# Patient Record
Sex: MALE | Race: BLACK OR AFRICAN AMERICAN | NOT HISPANIC OR LATINO | Employment: FULL TIME | URBAN - METROPOLITAN AREA
[De-identification: names, ages, dates, MRNs, and addresses within clinical notes are randomized per-mention and may not be internally consistent; named-entity substitution may affect disease eponyms.]

---

## 2017-01-01 ENCOUNTER — HOSPITAL ENCOUNTER (INPATIENT)
Facility: HOSPITAL | Age: 57
LOS: 1 days | Discharge: HOME | DRG: 176 | End: 2017-01-02
Attending: EMERGENCY MEDICINE | Admitting: HOSPITALIST

## 2017-01-01 DIAGNOSIS — R07.9 RIGHT-SIDED CHEST PAIN: ICD-10-CM

## 2017-01-01 DIAGNOSIS — I26.99 PULMONARY EMBOLISM: ICD-10-CM

## 2017-01-01 DIAGNOSIS — I26.99 OTHER ACUTE PULMONARY EMBOLISM WITHOUT ACUTE COR PULMONALE: ICD-10-CM

## 2017-01-01 DIAGNOSIS — H53.452 DECREASED PERIPHERAL VISION OF LEFT EYE: ICD-10-CM

## 2017-01-01 LAB
ALBUMIN SERPL BCP-MCNC: 3.8 G/DL
ALP SERPL-CCNC: 67 U/L
ALT SERPL W/O P-5'-P-CCNC: 19 U/L
ANION GAP SERPL CALC-SCNC: 9 MMOL/L
AST SERPL-CCNC: 21 U/L
BASOPHILS # BLD AUTO: 0.01 K/UL
BASOPHILS NFR BLD: 0.2 %
BILIRUB SERPL-MCNC: 0.6 MG/DL
BILIRUB UR QL STRIP: NEGATIVE
BNP SERPL-MCNC: 17 PG/ML
BUN SERPL-MCNC: 11 MG/DL
CALCIUM SERPL-MCNC: 9.5 MG/DL
CHLORIDE SERPL-SCNC: 107 MMOL/L
CLARITY UR REFRACT.AUTO: CLEAR
CO2 SERPL-SCNC: 25 MMOL/L
COLOR UR AUTO: YELLOW
CREAT SERPL-MCNC: 1.4 MG/DL
DIFFERENTIAL METHOD: ABNORMAL
EOSINOPHIL # BLD AUTO: 0 K/UL
EOSINOPHIL NFR BLD: 0.7 %
ERYTHROCYTE [DISTWIDTH] IN BLOOD BY AUTOMATED COUNT: 13.5 %
EST. GFR  (AFRICAN AMERICAN): >60 ML/MIN/1.73 M^2
EST. GFR  (NON AFRICAN AMERICAN): 56.2 ML/MIN/1.73 M^2
FLUAV AG SPEC QL IA: NEGATIVE
FLUBV AG SPEC QL IA: NEGATIVE
GLUCOSE SERPL-MCNC: 106 MG/DL
GLUCOSE UR QL STRIP: ABNORMAL
HCT VFR BLD AUTO: 42.4 %
HGB BLD-MCNC: 14.7 G/DL
HGB UR QL STRIP: NEGATIVE
KETONES UR QL STRIP: NEGATIVE
LEUKOCYTE ESTERASE UR QL STRIP: NEGATIVE
LIPASE SERPL-CCNC: 68 U/L
LYMPHOCYTES # BLD AUTO: 1.2 K/UL
LYMPHOCYTES NFR BLD: 20.4 %
MCH RBC QN AUTO: 29.5 PG
MCHC RBC AUTO-ENTMCNC: 34.7 %
MCV RBC AUTO: 85 FL
MONOCYTES # BLD AUTO: 0.7 K/UL
MONOCYTES NFR BLD: 13.1 %
NEUTROPHILS # BLD AUTO: 3.7 K/UL
NEUTROPHILS NFR BLD: 65.1 %
NITRITE UR QL STRIP: NEGATIVE
PH UR STRIP: 6 [PH] (ref 5–8)
PLATELET # BLD AUTO: 122 K/UL
PMV BLD AUTO: 10.4 FL
POTASSIUM SERPL-SCNC: 3.9 MMOL/L
PROT SERPL-MCNC: 7.8 G/DL
PROT UR QL STRIP: NEGATIVE
RBC # BLD AUTO: 4.98 M/UL
SODIUM SERPL-SCNC: 141 MMOL/L
SP GR UR STRIP: 1.01 (ref 1–1.03)
SPECIMEN SOURCE: NORMAL
TROPONIN I SERPL DL<=0.01 NG/ML-MCNC: <0.006 NG/ML
URN SPEC COLLECT METH UR: ABNORMAL
UROBILINOGEN UR STRIP-ACNC: NEGATIVE EU/DL
WBC # BLD AUTO: 5.63 K/UL

## 2017-01-01 PROCEDURE — 84484 ASSAY OF TROPONIN QUANT: CPT

## 2017-01-01 PROCEDURE — 99223 1ST HOSP IP/OBS HIGH 75: CPT | Mod: ,,, | Performed by: PHYSICIAN ASSISTANT

## 2017-01-01 PROCEDURE — 87400 INFLUENZA A/B EACH AG IA: CPT

## 2017-01-01 PROCEDURE — 96365 THER/PROPH/DIAG IV INF INIT: CPT

## 2017-01-01 PROCEDURE — 63600175 PHARM REV CODE 636 W HCPCS: Performed by: EMERGENCY MEDICINE

## 2017-01-01 PROCEDURE — 99291 CRITICAL CARE FIRST HOUR: CPT | Mod: ,,, | Performed by: EMERGENCY MEDICINE

## 2017-01-01 PROCEDURE — 96375 TX/PRO/DX INJ NEW DRUG ADDON: CPT

## 2017-01-01 PROCEDURE — 83880 ASSAY OF NATRIURETIC PEPTIDE: CPT

## 2017-01-01 PROCEDURE — 96372 THER/PROPH/DIAG INJ SC/IM: CPT

## 2017-01-01 PROCEDURE — 25500020 PHARM REV CODE 255: Performed by: EMERGENCY MEDICINE

## 2017-01-01 PROCEDURE — 85025 COMPLETE CBC W/AUTO DIFF WBC: CPT

## 2017-01-01 PROCEDURE — 80053 COMPREHEN METABOLIC PANEL: CPT

## 2017-01-01 PROCEDURE — 83690 ASSAY OF LIPASE: CPT

## 2017-01-01 PROCEDURE — 93010 ELECTROCARDIOGRAM REPORT: CPT | Mod: ,,, | Performed by: INTERNAL MEDICINE

## 2017-01-01 PROCEDURE — 99285 EMERGENCY DEPT VISIT HI MDM: CPT | Mod: 25

## 2017-01-01 PROCEDURE — 11000001 HC ACUTE MED/SURG PRIVATE ROOM

## 2017-01-01 PROCEDURE — 81003 URINALYSIS AUTO W/O SCOPE: CPT

## 2017-01-01 PROCEDURE — 87040 BLOOD CULTURE FOR BACTERIA: CPT

## 2017-01-01 RX ORDER — MOXIFLOXACIN HYDROCHLORIDE 400 MG/250ML
400 INJECTION, SOLUTION INTRAVENOUS
Status: COMPLETED | OUTPATIENT
Start: 2017-01-01 | End: 2017-01-01

## 2017-01-01 RX ORDER — ONDANSETRON 2 MG/ML
4 INJECTION INTRAMUSCULAR; INTRAVENOUS EVERY 8 HOURS PRN
Status: DISCONTINUED | OUTPATIENT
Start: 2017-01-01 | End: 2017-01-02 | Stop reason: HOSPADM

## 2017-01-01 RX ORDER — ONDANSETRON 8 MG/1
8 TABLET, ORALLY DISINTEGRATING ORAL EVERY 8 HOURS PRN
Status: DISCONTINUED | OUTPATIENT
Start: 2017-01-01 | End: 2017-01-02 | Stop reason: HOSPADM

## 2017-01-01 RX ORDER — MOXIFLOXACIN HYDROCHLORIDE 400 MG/250ML
400 INJECTION, SOLUTION INTRAVENOUS
Status: DISCONTINUED | OUTPATIENT
Start: 2017-01-02 | End: 2017-01-02

## 2017-01-01 RX ORDER — MORPHINE SULFATE 2 MG/ML
6 INJECTION, SOLUTION INTRAMUSCULAR; INTRAVENOUS
Status: COMPLETED | OUTPATIENT
Start: 2017-01-01 | End: 2017-01-01

## 2017-01-01 RX ORDER — ACETAMINOPHEN 325 MG/1
650 TABLET ORAL EVERY 6 HOURS PRN
Status: DISCONTINUED | OUTPATIENT
Start: 2017-01-01 | End: 2017-01-02 | Stop reason: HOSPADM

## 2017-01-01 RX ORDER — SODIUM CHLORIDE 0.9 % (FLUSH) 0.9 %
3 SYRINGE (ML) INJECTION EVERY 8 HOURS
Status: DISCONTINUED | OUTPATIENT
Start: 2017-01-02 | End: 2017-01-02 | Stop reason: HOSPADM

## 2017-01-01 RX ORDER — HYDROCODONE BITARTRATE AND ACETAMINOPHEN 5; 325 MG/1; MG/1
1 TABLET ORAL EVERY 6 HOURS PRN
Status: DISCONTINUED | OUTPATIENT
Start: 2017-01-01 | End: 2017-01-02 | Stop reason: HOSPADM

## 2017-01-01 RX ORDER — ENOXAPARIN SODIUM 100 MG/ML
1 INJECTION SUBCUTANEOUS
Status: DISCONTINUED | OUTPATIENT
Start: 2017-01-02 | End: 2017-01-02 | Stop reason: HOSPADM

## 2017-01-01 RX ORDER — ENOXAPARIN SODIUM 100 MG/ML
1 INJECTION SUBCUTANEOUS
Status: COMPLETED | OUTPATIENT
Start: 2017-01-01 | End: 2017-01-01

## 2017-01-01 RX ADMIN — MORPHINE SULFATE 6 MG: 2 INJECTION, SOLUTION INTRAMUSCULAR; INTRAVENOUS at 09:01

## 2017-01-01 RX ADMIN — IOHEXOL 100 ML: 350 INJECTION, SOLUTION INTRAVENOUS at 07:01

## 2017-01-01 RX ADMIN — MOXIFLOXACIN HYDROCHLORIDE 400 MG: 400 INJECTION, SOLUTION INTRAVENOUS at 09:01

## 2017-01-01 RX ADMIN — ENOXAPARIN SODIUM 80 MG: 100 INJECTION SUBCUTANEOUS at 09:01

## 2017-01-01 NOTE — IP AVS SNAPSHOT
[facility]  [address]  [facility] [address]  Phone: [phone]           Patient Discharge Instructions     Our goal is to set you up for success. This packet includes information on your condition, medications, and your home care. It will help you to care for yourself so you don't get sicker and need to go back to the hospital.     Please ask your nurse if you have any questions.        There are many details to remember when preparing to leave the hospital. Here is what you will need to do:    1. Take your medicine. If you are prescribed medications, review your Medication List in the following pages. You may have new medications to  at the pharmacy and others that you'll need to stop taking. Review the instructions for how and when to take your medications. Talk with your doctor or nurses if you are unsure of what to do.     2. Go to your follow-up appointments. Specific follow-up information is listed in the following pages. Your may be contacted by a transition nurse or clinical provider about future appointments. Be sure we have all of the phone numbers to reach you, if needed. Please contact your provider's office if you are unable to make an appointment.     3. Watch for warning signs. Your doctor or nurse will give you detailed warning signs to watch for and when to call for assistance. These instructions may also include educational information about your condition. If you experience any of warning signs to your health, call your doctor.               [facility]  Unless otherwise directed by your provider, please contact [facility], our nurse care line that is available for 24/7 assistance.     [phone] (toll-free)    Registered nurses in the [facility] provide clinical advisement, health education, appointment booking, and other advisory services.                    ** Verify the list of medication(s) below is accurate and up  to date. Carry this with you in case of emergency. If your medications have changed, please notify your healthcare provider.             Medication List      START taking these medications        Additional Info                      apixaban 5 mg Tab   Quantity:  90 tablet   Refills:  5    Instructions:  Take 2 tablets (10 mg total) in AM and 2 tablets (10 mg total) in PM for first 7 days by mouth. Then take 1 tablet (5 mg total) in AM and 1 tablet (5 mg total) in PM daily by mouth.     Begin Date    AM    Noon    PM    Bedtime       hydrocodone-acetaminophen 5-325mg 5-325 mg per tablet   Commonly known as:  NORCO   Quantity:  20 tablet   Refills:  0   Dose:  1 tablet    Last time this was given:  1 tablet on 1/2/2017  1:46 PM   Instructions:  Take 1 tablet by mouth every 6 (six) hours as needed.     Begin Date    AM    Noon    PM    Bedtime         CONTINUE taking these medications        Additional Info                      acetaminophen 500 MG tablet   Commonly known as:  TYLENOL   Refills:  0   Dose:  1000 mg    Instructions:  Take 1,000 mg by mouth daily as needed for Pain.     Begin Date    AM    Noon    PM    Bedtime            Where to Get Your Medications      You can get these medications from any pharmacy     Bring a paper prescription for each of these medications     apixaban 5 mg Tab    hydrocodone-acetaminophen 5-325mg 5-325 mg per tablet                  Please bring to all follow up appointments:    1. A copy of your discharge instructions.  2. All medicines you are currently taking in their original bottles.  3. Identification and insurance card.    Please arrive 15 minutes ahead of scheduled appointment time.    Please call 24 hours in advance if you must reschedule your appointment and/or time.        Follow-up Information     Follow up with Nessa Pedersen MD. Schedule an appointment as soon as possible for a visit in 1 week.    Specialties:  General Practice, Physical Medicine and  "Rehabilitation    Why:  hospital follow up    Contact information:    [address]  [phone]          Discharge Instructions     Future Orders    Activity as tolerated     Activity as tolerated     Diet general     Questions:    Total calories:      Fat restriction, if any:      Protein restriction, if any:      Na restriction, if any:      Fluid restriction:      Additional restrictions:      Diet general     Questions:    Total calories:      Fat restriction, if any:      Protein restriction, if any:      Na restriction, if any:      Fluid restriction:      Additional restrictions:          Primary Diagnosis     Your primary diagnosis was:  Blood Clot To Lung      Admission Information     Date & Time Provider Department CSN    1/1/2017  4:48 PM Hazel Holden MD [facility] [id number]      Care Providers     Provider Role Specialty Primary office phone    Hazel Holden MD Attending Provider Hospitalist [phone]    Corazon Anderson MD Team Attending  Hospitalist [phone]    Hazel Holden MD Team Attending  Hospitalist [phone]      Your Vitals Were     BP Pulse Temp Resp Height Weight    112/67 (BP Location: Right arm, Patient Position: Lying, BP Method: Automatic) 82 98.3 °F (36.8 °C) (Oral) 18 5' 9" (1.753 m) 84.4 kg (186 lb)    SpO2 BMI             97% 27.47 kg/m2         Recent Lab Values        6/13/2007                           6:10 AM           A1C 5.7                       Pending Labs     Order Current Status    Blood Culture #1 **CANNOT BE ORDERED STAT** Preliminary result    Blood Culture #2 **CANNOT BE ORDERED STAT** Preliminary result      Allergies as of 1/2/2017        Reactions    Pcn [Penicillins] Hives      Advance Directives     An advance directive is a document which, in the event you are no longer able to make decisions for yourself, tells your healthcare team what kind of treatment you do or do not want to receive, or who you " would like to make those decisions for you.  If you do not currently have an advance directive, [facility] encourages you to create one.  For more information call:  [phone]), [phone] ([phone]),  or log on to [email or web address].        Language Assistance Services     ATTENTION: Language assistance services are available, free of charge. Please call [phone].      ATENCIÓN: Si mame blake, tiene a monique disposición servicios gratuitos de asistencia lingüística. Llame al [phone].     [facility] Ý: N?u b?n nói Ti?ng Vi?t, có các d?ch v? h? tr? ngôn ng? mi?n phí dành cho b?n. G?i s? [phone].        Eliquis Informaiton       Apixaban Oral tablet  What is this medicine?  APIXABAN (a PIX a ban) is an anticoagulant (blood thinner). It is used to lower the chance of stroke in people with a medical condition called atrial fibrillation. It is also used to treat or prevent blood clots in the lungs or in the veins.  This medicine may be used for other purposes; ask your health care provider or pharmacist if you have questions.  What should I tell my health care provider before I take this medicine?  They need to know if you have any of these conditions:  · bleeding disorders  · bleeding in the brain  · blood in your stools (black or tarry stools) or if you have blood in your vomit  · history of stomach bleeding  · kidney disease  · liver disease  · mechanical heart valve  · an unusual or allergic reaction to apixaban, other medicines, foods, dyes, or preservatives  · pregnant or trying to get pregnant  · breast-feeding  How should I use this medicine?  Take this medicine by mouth with a glass of water. Follow the directions on the prescription label. You can take it with or without food. If it upsets your stomach, take it with food. Take your medicine at regular intervals. Do not take it more often than directed. Do not stop taking except on your doctor's advice. Stopping this  medicine may increase your risk of a blot clot. Be sure to refill your prescription before you run out of medicine.  Talk to your pediatrician regarding the use of this medicine in children. Special care may be needed.  Overdosage: If you think you have taken too much of this medicine contact a poison control center or emergency room at once.  NOTE: This medicine is only for you. Do not share this medicine with others.  What if I miss a dose?  If you miss a dose, take it as soon as you can. If it is almost time for your next dose, take only that dose. Do not take double or extra doses.  What may interact with this medicine?  This medicine may interact with the following:  · aspirin and aspirin-like medicines  · certain medicines for fungal infections like ketoconazole and itraconazole  · certain medicines for seizures like carbamazepine and phenytoin  · certain medicines that treat or prevent blood clots like warfarin, enoxaparin, and dalteparin  · clarithromycin  · NSAIDs, medicines for pain and inflammation, like ibuprofen or naproxen  · rifampin  · ritonavir  · Rosa's wort  This list may not describe all possible interactions. Give your health care provider a list of all the medicines, herbs, non-prescription drugs, or dietary supplements you use. Also tell them if you smoke, drink alcohol, or use illegal drugs. Some items may interact with your medicine.  What should I watch for while using this medicine?  Notify your doctor or health care professional and seek emergency treatment if you develop breathing problems; changes in vision; chest pain; severe, sudden headache; pain, swelling, warmth in the leg; trouble speaking; sudden numbness or weakness of the face, arm, or leg. These can be signs that your condition has gotten worse.  If you are going to have surgery, tell your doctor or health care professional that you are taking this medicine.  Tell your health care professional that you use this medicine  before you have a spinal or epidural procedure. Sometimes people who take this medicine have bleeding problems around the spine when they have a spinal or epidural procedure. This bleeding is very rare. If you have a spinal or epidural procedure while on this medicine, call your health care professional immediately if you have back pain, numbness or tingling (especially in your legs and feet), muscle weakness, paralysis, or loss of bladder or bowel control.  Avoid sports and activities that might cause injury while you are using this medicine. Severe falls or injuries can cause unseen bleeding. Be careful when using sharp tools or knives. Consider using an electric razor. Take special care brushing or flossing your teeth. Report any injuries, bruising, or red spots on the skin to your doctor or health care professional.  What side effects may I notice from receiving this medicine?  Side effects that you should report to your doctor or health care professional as soon as possible:  · allergic reactions like skin rash, itching or hives, swelling of the face, lips, or tongue  · signs and symptoms of bleeding such as bloody or black, tarry stools; red or dark-brown urine; spitting up blood or brown material that looks like coffee grounds; red spots on the skin; unusual bruising or bleeding from the eye, gums, or nose  This list may not describe all possible side effects. Call your doctor for medical advice about side effects. You may report side effects to FDA at 8-857-FDA-3977.  Where should I keep my medicine?  Keep out of the reach of children.  Store at room temperature between 20 and 25 degrees C (68 and 77 degrees F). Throw away any unused medicine after the expiration date.  NOTE: This sheet is a summary. It may not cover all possible information. If you have questions about this medicine, talk to your doctor, pharmacist, or health care provider.  NOTE:This sheet is a summary. It may not cover all possible  information. If you have questions about this medicine, talk to your doctor, pharmacist, or health care provider. Copyright© 2016 [employer]               [facility] compl[facility] with applicable Federal civil rights laws and does not discriminate on the basis of race, color, national origin, age, disability, or sex.

## 2017-01-01 NOTE — ED TRIAGE NOTES
PT REPORTS R SIDED CP X4 DAYS.  STATES IT USUALLY STARTS AROUND THIS TIME IN THE AFTERNOON.  MUCH WORSE WITH MOVEMENT AND DEEP INSPIRATION.  NO SOB BUT JUST PAIN WITH INSPIRATION.  DENIES NAUSEA, DIAPHORESIS OR OTHER CONCERNING CARDIAC SYMPTOM

## 2017-01-01 NOTE — PROVIDER PROGRESS NOTES - EMERGENCY DEPT.
Encounter Date: 1/1/2017    ED Physician Progress Notes       SCRIBE NOTE: I, Clayton Urban, am scribing for, and in the presence of,  Dr. Underwood.  Physician Statement: I, Dr. Underwood, personally performed the services described in this documentation as scribed by Clayton Urban in my presence, and it is both accurate and complete.      EKG - STEMI Decision  Initial Reading: No STEMI present.

## 2017-01-01 NOTE — ED PROVIDER NOTES
"Encounter Date: 1/1/2017    SCRIBE #1 NOTE: I, Claytoneleanor Urban, am scribing for, and in the presence of, Dr. Underwood.       History     Chief Complaint   Patient presents with    Chest Pain     r side for 3 days, hurts to cough or move on R side     Review of patient's allergies indicates:   Allergen Reactions    Pcn [penicillins] Hives     HPI Comments: Time seen by provider: 4:52 PM    This is a 56 y.o. male with no known pertinent PMHx who presents to the ED with a chief complaint of gradually worsening moderate to severe right sided chest pain with radiation to the abdomin x 4 days. Pt notes that the pain is made worst by coughing, sneezing, deep breathing, and lying on his right side. Pt had been treating the pain with tylenol and ibuprofen which had been helping but now they offer minimal relief and the pt cannot do his normal activities so he presented to the ED. Though the pt did have mildly low O2 saturation he does not endorse shortness of breath. Pt further denies fever, vomiting, cold symptoms, abnormal bowel movements, dysuria, trauma to his right side, heavy lifting, or a history of smoking. He does endorse mildly dark urine but states he attributed it to drinking beer. Pt denies any significant medical history other than a "mini stroke" in the past. There are no other associated symptoms or mitigating/aggravating factors reported at this time.     The history is provided by the patient.     Past Medical History   Diagnosis Date    Stroke      Past Medical History Pertinent Negatives   Diagnosis Date Noted    Anticoagulant long-term use 1/1/2017    Arthritis 1/1/2017    Asthma 1/1/2017    Cancer 1/1/2017    CHF (congestive heart failure) 1/1/2017    COPD (chronic obstructive pulmonary disease) 1/1/2017    Coronary artery disease 1/1/2017    Diabetes mellitus 1/1/2017    Encounter for blood transfusion 1/1/2017    Hypertension 1/1/2017    Seizures 1/1/2017    Thyroid disease 1/1/2017 "     History reviewed. No pertinent past surgical history.  Family History   Problem Relation Age of Onset    Heart disease Father      Social History   Substance Use Topics    Smoking status: Never Smoker    Smokeless tobacco: None    Alcohol use None     Review of Systems   Constitutional: Negative for chills and fever.   HENT: Negative for congestion.    Eyes: Negative for pain.   Respiratory: Negative for cough and shortness of breath.    Cardiovascular: Positive for chest pain (right side).   Gastrointestinal: Positive for abdominal pain (right side). Negative for constipation, diarrhea, nausea and vomiting.   Genitourinary: Negative for difficulty urinating and dysuria.   Musculoskeletal: Negative for back pain and neck pain.   Skin: Negative for rash.   Neurological: Negative for weakness and headaches.       Physical Exam   Initial Vitals   BP Pulse Resp Temp SpO2   01/01/17 1625 01/01/17 1622 01/01/17 1622 01/01/17 1622 01/01/17 1625   136/98 84 18 99.1 °F (37.3 °C) 92 %     Physical Exam    Nursing note and vitals reviewed.  Constitutional: He appears well-developed and well-nourished. He is not diaphoretic. No distress.   HENT:   Head: Normocephalic and atraumatic.   Mouth/Throat: Oropharynx is clear and moist.   Eyes: Conjunctivae and EOM are normal. Pupils are equal, round, and reactive to light.   Neck: Normal range of motion. Neck supple. No JVD present.   Cardiovascular: Normal rate, regular rhythm and normal heart sounds.   No murmur heard.  Normal pulses   Pulmonary/Chest: Breath sounds normal. No respiratory distress. He has no wheezes. He has no rhonchi. He has no rales. He exhibits tenderness (right side).   Abdominal: Soft. Bowel sounds are normal. He exhibits no distension and no mass. There is no tenderness. There is no rebound and no guarding.   Musculoskeletal: Normal range of motion. He exhibits no edema (no extremity) or tenderness.   Extremities are atraumatic.   Neurological: He is  alert and oriented to person, place, and time. No cranial nerve deficit or sensory deficit.   Skin: Skin is warm and dry. No rash noted.   Psychiatric: He has a normal mood and affect.         ED Course   Procedures  Labs Reviewed   CBC W/ AUTO DIFFERENTIAL - Abnormal; Notable for the following:        Result Value    Platelets 122 (*)     All other components within normal limits   COMPREHENSIVE METABOLIC PANEL - Abnormal; Notable for the following:     eGFR if non  56.2 (*)     All other components within normal limits   URINALYSIS - Abnormal; Notable for the following:     Glucose, UA Trace (*)     All other components within normal limits   LIPASE - Abnormal; Notable for the following:     Lipase 68 (*)     All other components within normal limits   CULTURE, BLOOD   CULTURE, BLOOD   INFLUENZA A AND B ANTIGEN   TROPONIN I   B-TYPE NATRIURETIC PEPTIDE     EKG Readings: (Independently Interpreted)   Heart Rate: 80.   NSR, no ischemic changes.        X-Rays:   Independently Interpreted Readings:   Chest X-Ray: No acute process    Abdomen: No acute process    Other Readings:  CTA of the chest shows pulmonary embolus with consolidation.     Medical Decision Making:   History:   Old Medical Records: I decided to obtain old medical records.  Old Records Summarized: other records.       <> Summary of Records: Medical records show pt was seen for plantar fasciitis in the past.   Initial Assessment:   This is a 56 y.o. male who presents with right sided chest pain with some extension into the abdomen. Will check labs and obtain a CXR. Unclear etiology at this time.   Independently Interpreted Test(s):   I have ordered and independently interpreted X-rays - see prior notes.  I have ordered and independently interpreted EKG Reading(s) - see prior notes  Clinical Tests:   Lab Tests: Ordered and Reviewed  Radiological Study: Reviewed and Ordered  Medical Tests: Reviewed and Ordered  ED Management:  8:46  pm  Pt's CTA showed a pulmonary embolism with consolidation. Will cover the pt with antibiotics and admit to internal medicine.     Additional MDM:   EKG: I have independently interpreted EKG(s) - see notes.   X-Rays: I have independently interpreted X-Ray(s) - see notes.          Scribe Attestation:   Scribe #1: I performed the above scribed service and the documentation accurately describes the services I performed. I attest to the accuracy of the note.    Attending Attestation:         Attending Critical Care:   Critical Care Times:   Direct Patient Care (initial evaluation, reassessments, and time considering the case)................................................................22 minutes.   Additional History from reviewing old medical records or taking additional history from the family, EMS, PCP, etc.......................4 minutes.   Ordering, Reviewing, and Interpreting Diagnostic Studies...............................................................................................................4 minutes.   Documentation..................................................................................................................................................................................4 minutes.   Consultation with other Physicians. .................................................................................................................................................4 minutes.   ==============================================================  · Total Critical Care Time - exclusive of procedural time: 38 minutes.  ==============================================================    Physician Attestation for Scribe:  Physician Attestation Statement for Scribe #1: I, Dr. Underwood, reviewed documentation, as scribed by Clayton Urban in my presence, and it is both accurate and complete.                 ED Course     Clinical Impression:   The encounter diagnosis was Pulmonary  embolism.    Disposition:   Disposition: Admitted       Klever Underwood MD  01/01/17 9716

## 2017-01-01 NOTE — IP AVS SNAPSHOT
[address]  [place] [address]  Phone: [phone]           I have received a copy of my After Visit Summary and discharge instructions from [facility].    INSTRUCTIONS RECEIVED AND UNDERSTOOD BY:                     Patient/Patient Representative: ________________________________________________________________     Date/Time: ________________________________________________________________                     Instructions Given By: ________________________________________________________________     Date/Time: ________________________________________________________________

## 2017-01-01 NOTE — ED NOTES
Pt identifiers checked and correct.    LOC: The patient is awake, alert and aware of environment with an appropriate affect, the patient is oriented x 3 and speaking appropriately.   APPEARANCE: Patient resting comfortably and in no acute distress, patient is clean and well groomed, patient's clothing is properly fastened.   SKIN: The skin is warm and dry, color consistent with ethnicity, patient has normal skin turgor and moist mucus membranes, skin intact, no breakdown or bruising noted.   MUSCULOSKELETAL: Patient moving all extremities spontaneously, no obvious swelling or deformities noted.   RESPIRATORY: Airway is open and patent, respirations are spontaneous, patient has a normal effort and rate, no accessory muscle use noted.   CARDIAC: Patient has a normal rate and regular rhythm, no periphreal edema noted, capillary refill < 3 seconds.   ABDOMEN: Soft and non tender to palpation, no distention noted, active bowel sounds present in all four quadrants.   NEUROLOGIC: PERRL, 3 mm bilaterally, eyes open spontaneously, behavior appropriate to situation, follows commands, facial expression symmetrical, bilateral hand grasp equal and even, purposeful motor response noted, normal sensation in all extremities when touched with a finger.

## 2017-01-02 VITALS
HEIGHT: 69 IN | SYSTOLIC BLOOD PRESSURE: 112 MMHG | RESPIRATION RATE: 18 BRPM | WEIGHT: 186 LBS | OXYGEN SATURATION: 97 % | TEMPERATURE: 98 F | DIASTOLIC BLOOD PRESSURE: 67 MMHG | HEART RATE: 82 BPM | BODY MASS INDEX: 27.55 KG/M2

## 2017-01-02 LAB
ANION GAP SERPL CALC-SCNC: 12 MMOL/L
BASOPHILS # BLD AUTO: 0.01 K/UL
BASOPHILS NFR BLD: 0.2 %
BUN SERPL-MCNC: 9 MG/DL
CALCIUM SERPL-MCNC: 9.4 MG/DL
CHLORIDE SERPL-SCNC: 105 MMOL/L
CHOLEST/HDLC SERPL: 2.5 {RATIO}
CO2 SERPL-SCNC: 23 MMOL/L
CREAT SERPL-MCNC: 1.4 MG/DL
DIFFERENTIAL METHOD: ABNORMAL
EOSINOPHIL # BLD AUTO: 0 K/UL
EOSINOPHIL NFR BLD: 0.5 %
ERYTHROCYTE [DISTWIDTH] IN BLOOD BY AUTOMATED COUNT: 13.5 %
EST. GFR  (AFRICAN AMERICAN): >60 ML/MIN/1.73 M^2
EST. GFR  (NON AFRICAN AMERICAN): 56.2 ML/MIN/1.73 M^2
GLUCOSE SERPL-MCNC: 123 MG/DL
HCT VFR BLD AUTO: 38.1 %
HDL/CHOLESTEROL RATIO: 39.5 %
HDLC SERPL-MCNC: 129 MG/DL
HDLC SERPL-MCNC: 51 MG/DL
HGB BLD-MCNC: 12.9 G/DL
INR PPP: 1
LDLC SERPL CALC-MCNC: 63.4 MG/DL
LYMPHOCYTES # BLD AUTO: 1.1 K/UL
LYMPHOCYTES NFR BLD: 17.3 %
MCH RBC QN AUTO: 28.7 PG
MCHC RBC AUTO-ENTMCNC: 33.9 %
MCV RBC AUTO: 85 FL
MONOCYTES # BLD AUTO: 1 K/UL
MONOCYTES NFR BLD: 16.1 %
NEUTROPHILS # BLD AUTO: 4 K/UL
NEUTROPHILS NFR BLD: 65.7 %
NONHDLC SERPL-MCNC: 78 MG/DL
PLATELET # BLD AUTO: 130 K/UL
PMV BLD AUTO: 11 FL
POTASSIUM SERPL-SCNC: 4 MMOL/L
PROTHROMBIN TIME: 10.9 SEC
RBC # BLD AUTO: 4.5 M/UL
SODIUM SERPL-SCNC: 140 MMOL/L
TRIGL SERPL-MCNC: 73 MG/DL
WBC # BLD AUTO: 6.14 K/UL

## 2017-01-02 PROCEDURE — 25000003 PHARM REV CODE 250: Performed by: PHYSICIAN ASSISTANT

## 2017-01-02 PROCEDURE — 85025 COMPLETE CBC W/AUTO DIFF WBC: CPT

## 2017-01-02 PROCEDURE — 63600175 PHARM REV CODE 636 W HCPCS: Performed by: PHYSICIAN ASSISTANT

## 2017-01-02 PROCEDURE — 85610 PROTHROMBIN TIME: CPT

## 2017-01-02 PROCEDURE — 80061 LIPID PANEL: CPT

## 2017-01-02 PROCEDURE — 36415 COLL VENOUS BLD VENIPUNCTURE: CPT

## 2017-01-02 PROCEDURE — 80048 BASIC METABOLIC PNL TOTAL CA: CPT

## 2017-01-02 RX ORDER — HYDROCODONE BITARTRATE AND ACETAMINOPHEN 5; 325 MG/1; MG/1
1 TABLET ORAL EVERY 6 HOURS PRN
Qty: 20 TABLET | Refills: 0 | Status: SHIPPED | OUTPATIENT
Start: 2017-01-02 | End: 2017-01-02

## 2017-01-02 RX ORDER — HYDROCODONE BITARTRATE AND ACETAMINOPHEN 5; 325 MG/1; MG/1
1 TABLET ORAL EVERY 6 HOURS PRN
Qty: 20 TABLET | Refills: 0 | Status: SHIPPED | OUTPATIENT
Start: 2017-01-02 | End: 2021-01-24 | Stop reason: CLARIF

## 2017-01-02 RX ORDER — ACETAMINOPHEN 500 MG
1000 TABLET ORAL DAILY PRN
COMMUNITY
End: 2021-01-24 | Stop reason: CLARIF

## 2017-01-02 RX ADMIN — HYDROCODONE BITARTRATE AND ACETAMINOPHEN 1 TABLET: 5; 325 TABLET ORAL at 12:01

## 2017-01-02 RX ADMIN — ENOXAPARIN SODIUM 80 MG: 100 INJECTION SUBCUTANEOUS at 09:01

## 2017-01-02 RX ADMIN — HYDROCODONE BITARTRATE AND ACETAMINOPHEN 1 TABLET: 5; 325 TABLET ORAL at 01:01

## 2017-01-02 NOTE — PLAN OF CARE
Problem: Patient Care Overview  Goal: Plan of Care Review  Outcome: Ongoing (interventions implemented as appropriate)  Pt A&Ox4. Ambulating in hallway with no pain noted to lower extremities. C/o 6/10 pain to R side of chest with SOB on exertion. Family at the bedside. Will continue to monitor. Discharge planned for today.

## 2017-01-02 NOTE — ASSESSMENT & PLAN NOTE
- Patient report 'black spot' in left lateral visual field x 2 weeks. Check CT head to r/o stroke. No other focal defects on exam.   - pending results consider NV or Neuro Opthomology consult.

## 2017-01-02 NOTE — H&P
"[facility] Hospital Medicine  History & Physical    Patient Name: Suraj Constantino  MRN: [id number]  Admission Date: 1/1/2017  Attending Physician: Dr. Nelson  Primary Care Provider: Nessa Pedersen MD    [facility] Medicine Team: [facility] [place] [facility] D Nickie Moon PA-C     Patient information was obtained from patient, spouse/SO and ER records.     Subjective:     Principal Problem:Acute pulmonary embolism    Chief Complaint:  "right side chest pain"     HPI: 56 year old male with no past medical history. He presents to ED today for right sided chest pain x 3 days that has progressively worsened. Per patient, pain worsens with laying flat or any deep inspiration such as a cough, sneeze, or laugh. No fevers, left side chest pain, SOB/SMITH, palpitations, or edema. Patient denies tobacco use, IVDU, air travel, or family history of blood clots. No weight changes. He does travel in a vehicle for long periods of time for work and in November was driving for almost 24 hours. No recent surgeries or hospitalizations. He endorses a 'black spot' in his left lateral visual field present x 2 weeks. No associated headache/migraine.     While in ED, VSS but noted to have O2 sat 92%. Initial labs unremarkable and trop wnl. CTA chest showed PE in RUL and RLL as well as noncalcified pulmonary nodule and consolidation with volume loss and groundglass opacity in RLL. Given IV Avelox and Lovenox. Admitted to medicine for further management.       Past Medical History   Diagnosis Date    Stroke        History reviewed. No pertinent past surgical history.    Review of patient's allergies indicates:   Allergen Reactions    Pcn [penicillins] Hives       No current facility-administered medications on file prior to encounter.      Current Outpatient Prescriptions on File Prior to Encounter   Medication Sig    ibuprofen (ADVIL,MOTRIN) 800 MG tablet Take 1 tablet (800 mg total) by mouth 3 (three) times daily as needed for " Pain.     Family History     Problem Relation (Age of Onset)    Heart disease Father        Social History Main Topics    Smoking status: Never Smoker    Smokeless tobacco: Not on file    Alcohol use Not on file    Drug use: Not on file    Sexual activity: Not on file     Review of Systems   Constitutional: Negative for activity change, appetite change, chills, fatigue and fever.   HENT: Negative for congestion, rhinorrhea, sinus pressure and sore throat.    Eyes: Positive for visual disturbance (left eye 'black spot' in lateral feild). Negative for pain and redness.   Respiratory: Negative for cough, chest tightness, shortness of breath, wheezing and stridor.    Cardiovascular: Positive for chest pain (right side, pleuritic chest pain). Negative for palpitations and leg swelling.   Gastrointestinal: Negative for abdominal distention, abdominal pain, blood in stool, constipation, diarrhea, nausea and vomiting.   Endocrine: Negative for cold intolerance and heat intolerance.   Genitourinary: Negative for dysuria, frequency, hematuria and urgency.   Musculoskeletal: Negative for arthralgias, back pain, myalgias and neck pain.   Skin: Negative for color change, pallor and rash.   Neurological: Negative for dizziness, tremors, syncope, weakness, numbness and headaches.   Hematological: Does not bruise/bleed easily.   Psychiatric/Behavioral: Negative for agitation, confusion and hallucinations. The patient is not nervous/anxious.      Objective:     Vital Signs (Most Recent):  Temp: 99 °F (37.2 °C) (01/01/17 2242)  Pulse: 87 (01/01/17 2242)  Resp: 16 (01/01/17 2242)  BP: 123/63 (01/01/17 2242)  SpO2: (!) 94 % (01/01/17 2242) Vital Signs (24h Range):  Temp:  [98.4 °F (36.9 °C)-99.1 °F (37.3 °C)] 99 °F (37.2 °C)  Pulse:  [84-95] 87  Resp:  [15-20] 16  BP: (120-139)/(63-98) 123/63     Weight: 84.4 kg (186 lb)  Body mass index is 27.47 kg/(m^2).    Physical Exam   Constitutional: He is oriented to person, place, and  time. He appears well-developed and well-nourished. No distress.   HENT:   Head: Normocephalic and atraumatic.   Mouth/Throat: Oropharynx is clear and moist.   Eyes: Conjunctivae and EOM are normal. Pupils are equal, round, and reactive to light. No scleral icterus.   Neck: Normal range of motion. Neck supple. No JVD present. No tracheal deviation present. No thyromegaly present.   Cardiovascular: Normal rate, regular rhythm and intact distal pulses.  Exam reveals no gallop and no friction rub.    No murmur heard.  Pulmonary/Chest: Effort normal and breath sounds normal. No respiratory distress. He has no wheezes. He has no rales.   Abdominal: Soft. Bowel sounds are normal. He exhibits no distension and no mass. There is no tenderness. There is no rebound and no guarding.   Musculoskeletal: Normal range of motion. He exhibits no edema.   Neurological: He is alert and oriented to person, place, and time. He displays normal reflexes. No cranial nerve deficit.   Skin: Skin is warm and dry. No rash noted. No erythema.   Psychiatric: He has a normal mood and affect. His behavior is normal.        Significant Labs:   CBC:   Recent Labs  Lab 01/01/17  1706   WBC 5.63   HGB 14.7   HCT 42.4   *     CMP:   Recent Labs  Lab 01/01/17  1706      K 3.9      CO2 25      BUN 11   CREATININE 1.4   CALCIUM 9.5   PROT 7.8   ALBUMIN 3.8   BILITOT 0.6   ALKPHOS 67   AST 21   ALT 19   ANIONGAP 9   EGFRNONAA 56.2*       Significant Imaging: CT: I have reviewed all pertinent results/findings within the past 24 hours and my personal findings are:  CTA chest with PE and consolidation in RLL    Assessment/Plan:     * Acute pulmonary embolism  - Right side pleuritic chest pain in the setting of RUL and RLL PE and consolidation. Likely provoked PE in setting of prolonged immobilization.  - check BL venous doppler U/S to r/o DVT as well.  - monitor O2 sats and supp Oxygen PRN. Cover empirically with Avelox given  possible consolidation on CTA.   - Started on therapeutic Lovenox in ED. Continue Lovenox 1 mg/kg BID.   - Discussed with patient use of warfarin/lovenox vs NOAC for treatment and would like SW to assist with cost of medications to help aid in decision.      Right-sided chest pain  - As above. Treat underlying cause and analgesics PRN. Trop wnl and EKG with NSR.     Decreased peripheral vision of left eye  - Patient report 'black spot' in left lateral visual field x 2 weeks. Check CT head to r/o stroke. No other focal defects on exam.   - pending results consider NV or Neuro Opthomology consult.       VTE Risk Mitigation         Ordered     High Risk of VTE  Once      01/01/17 2048        Nickie Moon PA-C  Department of Hospital Medicine   [facility]

## 2017-01-02 NOTE — ASSESSMENT & PLAN NOTE
- Right side pleuritic chest pain in the setting of RUL and RLL PE and consolidation. Likely provoked PE in setting of prolonged immobilization.  - Started on therapeutic Lovenox in ED. Continue Lovenox 1 mg/kg BID.   - monitor O2 sats and supp Oxygen PRN  - Discussed with patient use of warfarin/lovenox vs NOAC for treatment and would like SW to assist with cost of medications to help aid in decision.

## 2017-01-02 NOTE — ASSESSMENT & PLAN NOTE
- Patient report 'black spot' in left lateral visual field x 2 weeks.   - Check CT head neg for  acute intracranial abnormality  - can consider outpatient opthalmology appt

## 2017-01-02 NOTE — SUBJECTIVE & OBJECTIVE
Interval History: No acute events overnight. Patient had some chest tenderness overnight that improved with norco.     Review of Systems   Constitutional: Negative for chills and fever.   HENT: Negative for trouble swallowing.    Eyes: Negative for visual disturbance.   Respiratory: Negative for cough and shortness of breath.    Cardiovascular: Negative for chest pain and palpitations.   Gastrointestinal: Negative for abdominal pain.   Genitourinary: Negative for dysuria.   Musculoskeletal: Negative for back pain.   Neurological: Negative for weakness and headaches.     Objective:     Vital Signs (Most Recent):  Temp: 98.3 °F (36.8 °C) (01/02/17 1117)  Pulse: 82 (01/02/17 1117)  Resp: 18 (01/02/17 1117)  BP: 112/67 (01/02/17 1117)  SpO2: 97 % (01/02/17 1117) Vital Signs (24h Range):  Temp:  [98.3 °F (36.8 °C)-99.1 °F (37.3 °C)] 98.3 °F (36.8 °C)  Pulse:  [69-95] 82  Resp:  [15-20] 18  BP: (102-139)/(63-98) 112/67     Weight: 84.4 kg (186 lb)  Body mass index is 27.47 kg/(m^2).    Intake/Output Summary (Last 24 hours) at 01/02/17 1550  Last data filed at 01/02/17 0000   Gross per 24 hour   Intake              490 ml   Output                0 ml   Net              490 ml      Physical Exam   Constitutional: He is oriented to person, place, and time. He appears well-developed and well-nourished.   HENT:   Head: Normocephalic and atraumatic.   Right Ear: External ear normal.   Left Ear: External ear normal.   Eyes: EOM are normal.   Neck: Normal range of motion. Neck supple.   Cardiovascular: Normal rate, regular rhythm, normal heart sounds and intact distal pulses.    Pulmonary/Chest: Effort normal and breath sounds normal. He exhibits tenderness.   Abdominal: Soft. Bowel sounds are normal.   Musculoskeletal: Normal range of motion. He exhibits no edema.   Neurological: He is alert and oriented to person, place, and time.   Skin: Skin is warm and dry.   Psychiatric: He has a normal mood and affect. His behavior is  normal.       Significant Labs: All pertinent labs within the past 24 hours have been reviewed.    Significant Imaging: I have reviewed all pertinent imaging results/findings within the past 24 hours.

## 2017-01-02 NOTE — PROGRESS NOTES
Pt discharged per MD orders. Verbalized understanding of discharge instructions. No acute distress noted at this time. IV removed, catheter tip intact. Pt walked off the unit.

## 2017-01-02 NOTE — PROGRESS NOTES
[facility] Hospital Medicine  Progress Note    Patient Name: Suraj Constantino  MRN: [id number]  Patient Class: IP- Inpatient   Admission Date: 1/1/2017  Length of Stay: 1 days  Expected Discharge Date: 1/2/2017  Attending Physician: No att. providers found  Primary Care Provider: Nessa Pedersen MD    [facility] Medicine Team: [facility] [place] [facility] 1 Dez Roper MD    Subjective:     Principal Problem:Acute pulmonary embolism    HPI:  56 year old male with no past medical history. He presents to ED today for right sided chest pain x 3 days that has progressively worsened. Per patient, pain worsens with laying flat or any deep inspiration such as a cough, sneeze, or laugh. No fevers, left side chest pain, SOB/SMITH, palpitations, or edema. Patient denies tobacco use, IVDU, air travel, or family history of blood clots. No weight changes. He does travel in a vehicle for long periods of time for work and in November was driving for almost 24 hours. No recent surgeries or hospitalizations. He endorses a 'black spot' in his left lateral visual field present x 2 weeks. No associated headache/migraine.     While in ED, VSS but noted to have O2 sat 92%. Initial labs unremarkable and trop wnl. CTA chest showed PE in RUL and RLL as well as noncalcified pulmonary nodule and consolidation with volume loss and groundglass opacity in RLL. Given IV Avelox and Lovenox. Admitted to medicine for further management.       Hospital Course:  Patient was placed on SQ lovenox to treat PE. Avelox was started after finding of LLL consolidation on CTA suggestive of pneumonia but was discontinued since there was no clinical picture of pneuomonia (afebrile, no elev WBC, no cough). Discussed with patient options for anticoagulation (lovenox vs oral AC) and patient seemed to prefer oral AC. Patient currently without insurance so coupons were given for 30-day supply of eliquis until insurance     Interval History: No acute events  overnight. Patient had some chest tenderness overnight that improved with norco.     Review of Systems   Constitutional: Negative for chills and fever.   HENT: Negative for trouble swallowing.    Eyes: Negative for visual disturbance.   Respiratory: Negative for cough and shortness of breath.    Cardiovascular: Negative for chest pain and palpitations.   Gastrointestinal: Negative for abdominal pain.   Genitourinary: Negative for dysuria.   Musculoskeletal: Negative for back pain.   Neurological: Negative for weakness and headaches.     Objective:     Vital Signs (Most Recent):  Temp: 98.3 °F (36.8 °C) (01/02/17 1117)  Pulse: 82 (01/02/17 1117)  Resp: 18 (01/02/17 1117)  BP: 112/67 (01/02/17 1117)  SpO2: 97 % (01/02/17 1117) Vital Signs (24h Range):  Temp:  [98.3 °F (36.8 °C)-99.1 °F (37.3 °C)] 98.3 °F (36.8 °C)  Pulse:  [69-95] 82  Resp:  [15-20] 18  BP: (102-139)/(63-98) 112/67     Weight: 84.4 kg (186 lb)  Body mass index is 27.47 kg/(m^2).    Intake/Output Summary (Last 24 hours) at 01/02/17 1550  Last data filed at 01/02/17 0000   Gross per 24 hour   Intake              490 ml   Output                0 ml   Net              490 ml      Physical Exam   Constitutional: He is oriented to person, place, and time. He appears well-developed and well-nourished.   HENT:   Head: Normocephalic and atraumatic.   Right Ear: External ear normal.   Left Ear: External ear normal.   Eyes: EOM are normal.   Neck: Normal range of motion. Neck supple.   Cardiovascular: Normal rate, regular rhythm, normal heart sounds and intact distal pulses.    Pulmonary/Chest: Effort normal and breath sounds normal. He exhibits tenderness.   Abdominal: Soft. Bowel sounds are normal.   Musculoskeletal: Normal range of motion. He exhibits no edema.   Neurological: He is alert and oriented to person, place, and time.   Skin: Skin is warm and dry.   Psychiatric: He has a normal mood and affect. His behavior is normal.       Significant Labs: All  pertinent labs within the past 24 hours have been reviewed.    Significant Imaging: I have reviewed all pertinent imaging results/findings within the past 24 hours.    Assessment/Plan:      * Acute pulmonary embolism  - Right side pleuritic chest pain in the setting of RUL and RLL PE and consolidation. Likely provoked PE in setting of prolonged immobilization.  - BL venous doppler U/S neg for DVT  - D/c'd Avelox since pneumonia not likely  - Started on sq Lovenox 1 mg/kg BID. Will transition patient to oral anticoagulation with eliquis. Coupons given for 30-day free supply     Right-sided chest pain  - As above. Treat underlying cause and analgesics PRN. Trop wnl and EKG with NSR.     Decreased peripheral vision of left eye  - Patient report 'black spot' in left lateral visual field x 2 weeks.   - Check CT head neg for  acute intracranial abnormality  - can consider outpatient opthalmology appt      VTE Risk Mitigation         Ordered     High Risk of VTE  Once      01/01/17 2048        Dispo: D/c home    Dez Roper MD  Department of Hospital Medicine   [facility]

## 2017-01-02 NOTE — SUBJECTIVE & OBJECTIVE
Past Medical History   Diagnosis Date    Stroke        History reviewed. No pertinent past surgical history.    Review of patient's allergies indicates:   Allergen Reactions    Pcn [penicillins] Hives       No current facility-administered medications on file prior to encounter.      Current Outpatient Prescriptions on File Prior to Encounter   Medication Sig    ibuprofen (ADVIL,MOTRIN) 800 MG tablet Take 1 tablet (800 mg total) by mouth 3 (three) times daily as needed for Pain.     Family History     Problem Relation (Age of Onset)    Heart disease Father        Social History Main Topics    Smoking status: Never Smoker    Smokeless tobacco: Not on file    Alcohol use Not on file    Drug use: Not on file    Sexual activity: Not on file     Review of Systems   Constitutional: Negative for activity change, appetite change, chills, fatigue and fever.   HENT: Negative for congestion, rhinorrhea, sinus pressure and sore throat.    Eyes: Positive for visual disturbance (left eye 'black spot' in lateral feild). Negative for pain and redness.   Respiratory: Negative for cough, chest tightness, shortness of breath, wheezing and stridor.    Cardiovascular: Positive for chest pain (right side, pleuritic chest pain). Negative for palpitations and leg swelling.   Gastrointestinal: Negative for abdominal distention, abdominal pain, blood in stool, constipation, diarrhea, nausea and vomiting.   Endocrine: Negative for cold intolerance and heat intolerance.   Genitourinary: Negative for dysuria, frequency, hematuria and urgency.   Musculoskeletal: Negative for arthralgias, back pain, myalgias and neck pain.   Skin: Negative for color change, pallor and rash.   Neurological: Negative for dizziness, tremors, syncope, weakness, numbness and headaches.   Hematological: Does not bruise/bleed easily.   Psychiatric/Behavioral: Negative for agitation, confusion and hallucinations. The patient is not nervous/anxious.       Objective:     Vital Signs (Most Recent):  Temp: 99 °F (37.2 °C) (01/01/17 2242)  Pulse: 87 (01/01/17 2242)  Resp: 16 (01/01/17 2242)  BP: 123/63 (01/01/17 2242)  SpO2: (!) 94 % (01/01/17 2242) Vital Signs (24h Range):  Temp:  [98.4 °F (36.9 °C)-99.1 °F (37.3 °C)] 99 °F (37.2 °C)  Pulse:  [84-95] 87  Resp:  [15-20] 16  BP: (120-139)/(63-98) 123/63     Weight: 84.4 kg (186 lb)  Body mass index is 27.47 kg/(m^2).    Physical Exam   Constitutional: He is oriented to person, place, and time. He appears well-developed and well-nourished. No distress.   HENT:   Head: Normocephalic and atraumatic.   Mouth/Throat: Oropharynx is clear and moist.   Eyes: Conjunctivae and EOM are normal. Pupils are equal, round, and reactive to light. No scleral icterus.   Neck: Normal range of motion. Neck supple. No JVD present. No tracheal deviation present. No thyromegaly present.   Cardiovascular: Normal rate, regular rhythm and intact distal pulses.  Exam reveals no gallop and no friction rub.    No murmur heard.  Pulmonary/Chest: Effort normal and breath sounds normal. No respiratory distress. He has no wheezes. He has no rales.   Abdominal: Soft. Bowel sounds are normal. He exhibits no distension and no mass. There is no tenderness. There is no rebound and no guarding.   Musculoskeletal: Normal range of motion. He exhibits no edema.   Neurological: He is alert and oriented to person, place, and time. He displays normal reflexes. No cranial nerve deficit.   Skin: Skin is warm and dry. No rash noted. No erythema.   Psychiatric: He has a normal mood and affect. His behavior is normal.        Significant Labs:   CBC:   Recent Labs  Lab 01/01/17  1706   WBC 5.63   HGB 14.7   HCT 42.4   *     CMP:   Recent Labs  Lab 01/01/17  1706      K 3.9      CO2 25      BUN 11   CREATININE 1.4   CALCIUM 9.5   PROT 7.8   ALBUMIN 3.8   BILITOT 0.6   ALKPHOS 67   AST 21   ALT 19   ANIONGAP 9   EGFRNONAA 56.2*       Significant  Imaging: CT: I have reviewed all pertinent results/findings within the past 24 hours and my personal findings are:  CTA chest with PE and consolidation in RLL

## 2017-01-02 NOTE — ED NOTES
Pt identifiers checked and correct.    LOC: The patient is awake, alert and aware of environment with an appropriate affect, the patient is oriented x 3 and speaking appropriately.   APPEARANCE: Patient resting comfortably and in no acute distress, patient is clean and well groomed, patient's clothing is properly fastened.   SKIN: The skin is warm and dry, color consistent with ethnicity, patient has normal skin turgor and moist mucus membranes, skin intact, no breakdown or bruising noted.   MUSCULOSKELETAL: Patient moving all extremities spontaneously, no obvious swelling or deformities noted.   RESPIRATORY: Airway is open and patent, respirations are spontaneous, patient has a normal effort and rate, no accessory muscle use noted.   CARDIAC: Patient has a normal rate and regular rhythm, no periphreal edema noted, capillary refill < 3 seconds.   ABDOMEN: Soft and non tender to palpation, no distention noted, active bowel sounds present in all four quadrants.   Pain is worse with deep breath or lying down or movement.  States pain is best when standing or sitting up.  To go from lying to sitting is worse, not better.

## 2017-01-04 ENCOUNTER — PATIENT OUTREACH (OUTPATIENT)
Dept: ADMINISTRATIVE | Facility: CLINIC | Age: 57
End: 2017-01-04

## 2017-01-04 NOTE — DISCHARGE SUMMARY
DISCHARGE SUMMARY  Hospital Medicine    Team: [facility]    Patient Name: Suraj Constantino  YOB: 1960    Admit Date: 1/1/2017    Discharge Date: 01/02/2017    Discharge Attending Physician: Hazel Holden MD    Resident on Service: Eilene Archibald MD    Chief Complaint: right-sided chest pain    Princilpal Diagnoses:  Active Hospital Problems    Diagnosis  POA    *Acute pulmonary embolism [I26.99]  Yes    Right-sided chest pain [R07.9]  Yes    Decreased peripheral vision of left eye [H53.452]  Yes      Resolved Hospital Problems    Diagnosis Date Resolved POA   No resolved problems to display.       Discharged Condition: Admit problems have stabilized       HOSPITAL COURSE:      Initial Presentation:    56 year old male with no past medical history. He presents to ED today for right sided chest pain x 3 days that has progressively worsened. Per patient, pain worsens with laying flat or any deep inspiration such as a cough, sneeze, or laugh. No fevers, left side chest pain, SOB/SMITH, palpitations, or edema. Patient denies tobacco use, IVDU, air travel, or family history of blood clots. No weight changes. He does travel in a vehicle for long periods of time for work and in November was driving for almost 24 hours. No recent surgeries or hospitalizations. He endorses a 'black spot' in his left lateral visual field present x 2 weeks. No associated headache/migraine.      While in ED, VSS but noted to have O2 sat 92%. Initial labs unremarkable and trop wnl. CTA chest showed PE in RUL and RLL as well as noncalcified pulmonary nodule and consolidation with volume loss and groundglass opacity in RLL. Given IV Avelox and Lovenox. Admitted to medicine for further management.     Course of Principle Problem for Admission:    Patient was placed on SQ lovenox to treat PE. Avelox was started after finding of LLL consolidation on CTA suggestive of pneumonia but was discontinued since there was no clinical  picture of pneuomonia (afebrile, no elev WBC, no cough). Discussed with patient options for anticoagulation (lovenox vs oral AC) and patient seemed to prefer oral AC. Patient currently without insurance so coupons were given for 30-day supply of eliquis until insurance     Other Medical Problems Addressed in the Hospital:    * Acute pulmonary embolism  - Right side pleuritic chest pain in the setting of RUL and RLL PE and consolidation. Likely provoked PE in setting of prolonged immobilization.  - BL venous doppler U/S neg for DVT  - D/c'd Avelox since pneumonia not likely  - Started on sq Lovenox 1 mg/kg BID. Will transition patient to oral anticoagulation with eliquis. Coupons given for 30-day free supply      Right-sided chest pain  - As above. Treat underlying cause and analgesics PRN. Trop wnl and EKG with NSR.      Decreased peripheral vision of left eye  - Patient report 'black spot' in left lateral visual field x 2 weeks.   - Check CT head neg for  acute intracranial abnormality  - can consider outpatient opthalmology appt    CONSULTS: none    Last CBC/BMP/HgbA1c (if applicable):  Recent Results (from the past 336 hour(s))   CBC auto differential    Collection Time: 01/02/17  4:26 AM   Result Value Ref Range    WBC 6.14 3.90 - 12.70 K/uL    Hemoglobin 12.9 (L) 14.0 - 18.0 g/dL    Hematocrit 38.1 (L) 40.0 - 54.0 %    Platelets 130 (L) 150 - 350 K/uL   CBC auto differential    Collection Time: 01/01/17  5:06 PM   Result Value Ref Range    WBC 5.63 3.90 - 12.70 K/uL    Hemoglobin 14.7 14.0 - 18.0 g/dL    Hematocrit 42.4 40.0 - 54.0 %    Platelets 122 (L) 150 - 350 K/uL     Recent Results (from the past 336 hour(s))   Basic metabolic panel    Collection Time: 01/02/17  4:26 AM   Result Value Ref Range    Sodium 140 136 - 145 mmol/L    Potassium 4.0 3.5 - 5.1 mmol/L    Chloride 105 95 - 110 mmol/L    CO2 23 23 - 29 mmol/L    BUN, Bld 9 6 - 20 mg/dL    Creatinine 1.4 0.5 - 1.4 mg/dL    Calcium 9.4 8.7 - 10.5 mg/dL     Anion Gap 12 8 - 16 mmol/L     Lab Results   Component Value Date    HGBA1C 5.7 06/13/2007       Other Pertinent Lab Findings:  Troponin 0.00, BNP 17    Pertinent/Significant Diagnostic Studies:    CTA Chest (1/1/17)  Intraluminal filling defects in the RIGHT upper lobar pulmonary arteries and RIGHT lower lobe segmental arteries consistent with pulmonary thromboembolism.    Noncalcified round pulmonary nodule in the RIGHT middle lobe measuring 0.7 cm (axial series 3 image 59).  Differential considerations include infection, postinfectious inflammation, noninfectious inflammatory change, or neoplasm. Per Fleischner Society guidelines; in a low risk patient, consider 6-12 month (6/2017-12/2017) and 18-24 month (6/2018-1/2018) month CT chest follow up. In a high risk patient (history of smoking or malignancy), consider 3  month (3/2017) , 9 month (9/2017) , and 24 month (1/2018)  month CT chest follow up to assess for stability.    Consolidation with volume loss and diffuse groundglass opacity in the RIGHT lower lobe and small consolidation in the posterior basal segment of the LEFT lower lobe.  The differential considerations include pneumonia or aspiration with associated pneumonitis. Pulmonary infarct at the right base is an additional consideration.    Prominent mediastinal and bilateral axillary nodes, likely reactive.    No abnormal findings in the abdomen despite this patient's right-sided abdominal pain.    Few colonic diverticuli without evidence of acute diverticulitis.    Special Treatments/Procedures: none    Disposition:  Home        Future Scheduled Appointments:  No future appointments.    Follow-up Plans from This Hospitalization:  PCP in 1 week     Discharge Medication List:     Suraj Constantino   Home Medication Instructions JANNETH:[id number]    Printed on:01/04/17 0631   Medication Information                      acetaminophen (TYLENOL) 500 MG tablet  Take 1,000 mg by mouth daily as needed for  Pain.             apixaban 5 mg Tab  Take 2 tablets (10 mg total) in AM and 2 tablets (10 mg total) in PM for first 7 days by mouth. Then take 1 tablet (5 mg total) in AM and 1 tablet (5 mg total) in PM daily by mouth.             hydrocodone-acetaminophen 5-325mg (NORCO) 5-325 mg per tablet  Take 1 tablet by mouth every 6 (six) hours as needed.                 Patient Instructions:    Discharge Procedure Orders  Diet general     Diet general     Activity as tolerated     Activity as tolerated         At the time of discharge patient was told to take all medications as prescribed, to keep all followup appointments, and to call their primary care physician or return to the emergency room if they have any worsening or concerning symptoms.    Dez Roper DO  PGY1 Internal Medicine  Pager: [phone]

## 2017-01-04 NOTE — PATIENT INSTRUCTIONS
Pulmonary Embolism (PE)  A pulmonary embolus is most often from a blood clot (thrombus) in a deep vein of the leg. This is called deep vein thrombosis (DVT). Part of the clot may break off and travel to the lungs. This is called a pulmonary embolism. This can cut off the flow of blood in the lungs.  A blood clot in the lungs is a medical emergency and may cause death.   Health care providers use the term venous thromboembolism (VTE) to describe these 2 conditions: deep vein thrombosis and pulmonary embolism. They use the term VTE because the 2 conditions are very closely related. And, because their prevention and treatment are also closely related.      A pulmonary embolism occurs when a blood clot forms in a vein and travels to the lungs.   How is pulmonary embolism diagnosed?  Your health care provider examines you and asks about your symptoms and health history. You may also have one or more of the following:  · Blood tests to check for blood clotting or other problems.  · Imaging tests to look for clots in the veins or lung.  · Electrocardiography (ECG or EKG) to test how well the heart is working.  How is pulmonary embolism treated?  · Blood-thinning medicines (anticoagulants). These medicines thin the blood. They may be given as a pill, as an injection, or through a tube into a vein (intravenous or IV). Blood thinners help prevent more blood clots from forming. They also help to prevent an existing clot from getting larger.  · Thrombolysis. Thrombolytic medicines are used to quickly dissolve a blood clot. A long, narrow tube (catheter) is used to deliver medicine directly to the clot. Thrombolytic medicines increase the risk of bleeding so they are used very carefully.  · Inferior vena cava (IVC) filter surgery. The vena cava is the bodys largest vein. It carries blood from the body to the heart. A small filter traps blood clots in the lower body and prevents them from traveling to the lungs. The filter is  inserted into the vein through a catheter. The filter may be used if blood thinners cannot be taken or if they don't work.   · Pulmonary embolectomy. This is a procedure to remove a blood clot in the lungs. It may be done with surgery or with a catheter inserted in the body. It may be done when other treatments aren't safe or don't work.  What are the long-term concerns?  With treatment, blood clots are usually dissolved or removed. Some treatments can even help prevent future clots. But having a PE can put you at risk for another life-threatening blood clot. So, you will likely need to take anticoagulants to help keep blood clots from forming again. You may need to take this medicine for months or years.  You may also need to make lifestyle changes. This may include getting more active and eating healthier. You may need to wear elastic (compression) stockings and and take breaks on long trips.  Get emergency help  Call 911 or get emergency help if you have symptoms of a blood clot that has traveled to the lungs. The symptoms include:  · Chest pain  · Trouble breathing  · Coughing (may cough up blood)  · Fainting  · Fast heartbeat  · Sweating  Call your health care provider if you have swelling or pain in your leg, arm, or other area. These are symptoms of a blood clot.  You may have bleeding if you take medicine to help prevent blood clots. Call 911 if you have heavy or uncontrolled bleeding. Call your health care provider if you have signs or symptoms of bleeding. For example, blood in the urine, bleeding with bowel movements, or bleeding from the nose, gums, a cut, or vagina.  © 4117-1012 [employer]. [address] [place] [address], [place] [address]. All rights reserved. This information is not intended as a substitute for professional medical care. Always follow your healthcare professional's instructions.

## 2017-01-06 LAB
BACTERIA BLD CULT: NORMAL
BACTERIA BLD CULT: NORMAL

## 2017-01-31 ENCOUNTER — OFFICE VISIT (OUTPATIENT)
Dept: INTERNAL MEDICINE | Facility: CLINIC | Age: 57
End: 2017-01-31

## 2017-01-31 VITALS
HEIGHT: 69 IN | OXYGEN SATURATION: 95 % | SYSTOLIC BLOOD PRESSURE: 110 MMHG | BODY MASS INDEX: 27.88 KG/M2 | DIASTOLIC BLOOD PRESSURE: 60 MMHG | WEIGHT: 188.25 LBS | HEART RATE: 66 BPM

## 2017-01-31 DIAGNOSIS — Z00.00 ROUTINE GENERAL MEDICAL EXAMINATION AT A HEALTH CARE FACILITY: ICD-10-CM

## 2017-01-31 DIAGNOSIS — I26.90 ACUTE SEPTIC PULMONARY EMBOLISM WITHOUT ACUTE COR PULMONALE: Primary | ICD-10-CM

## 2017-01-31 PROCEDURE — 99999 PR PBB SHADOW E&M-EST. PATIENT-LVL III: CPT | Mod: PBBFAC,,, | Performed by: INTERNAL MEDICINE

## 2017-01-31 PROCEDURE — 99396 PREV VISIT EST AGE 40-64: CPT | Mod: S$PBB,,, | Performed by: INTERNAL MEDICINE

## 2017-01-31 PROCEDURE — 99213 OFFICE O/P EST LOW 20 MIN: CPT | Mod: PBBFAC | Performed by: INTERNAL MEDICINE

## 2017-01-31 RX ORDER — ASPIRIN 81 MG/1
81 TABLET ORAL DAILY
COMMUNITY
End: 2021-01-24 | Stop reason: CLARIF

## 2017-01-31 NOTE — PROGRESS NOTES
"CHIEF COMPLAINT: Annual exam    HISTORY OF PRESENT ILLNESS: This is a 56 year old who presents for his annual physical. He presented to the ED on 1/1/17 due to right sided chest pain. He was diagnosed with a right sided pulmonary embolus. He stayed overnight in the hospital due to low oxygen levels. He was discharged the next day on Eliquis 5 mg twice daily. Right sided chest pain resolved but returned 4 days ago. The right sided chest pain lasted 1-2 days and has since resolved. No left sided chest pain, shorntess of breath, palpitations, nausea, vomiting, fever, chills, constipation, diarrhea, dysuria, hematuria, joint pain or muscle pain. He drives long distances to travel for his work as a .  He has not been taking an aspirin a day    PAST MEDICAL HISTORY:  1. Acute stroke in the left frontoparietal region, June 2007. Symptoms resolved upon discharge from the hospital. MRI was consistent with acute stroke.   2. Suggested carrier for factor V Leiden mutation in June 2007 at the time of his stroke. HE had a low activated protein C level at 1.8 with negative gene mutation.  3. Lymph node biopsy in 1989 that was benign.  4. History of leukopenia. He reports he had a workup at [facility]  that was negative.    MEDS AND ALLERGIES: Updated on EPIC   He does not smoke. He drinks alcohol 1 time a week. He is  with 3 children. He works in a refinery as a .     REVIEW OF SYSTEMS: He denies fevers, chills, night sweats, visual change, hearing loss, sinus congestion, sore throat, chest pain, shortness of breath, nausea, vomiting,constipation, diarrhea, dysuria, hematuria, nocturia, joint pain, other joint pain or muscle pain, polydipsia, polyuria, hot or cold intolerance, anxiety, depression, insomnia.     PHYSICAL EXAM:   Visit Vitals    /60    Pulse 66    Ht 5' 9" (1.753 m)    Wt 85.4 kg (188 lb 4.4 oz)    SpO2 95%    BMI 27.8 kg/m2       GENERAL: Alert, oriented. No " apparent distress. Affect within normal  limits. Conjunctivae anicteric. Tympanic membranes clear. Oropharynx  clear.  NECK: Supple. Respiratory effort normal. Lungs clear to auscultation.  HEART: Regular rate and rhythm without murmurs, gallops, or rubs. No lower  extremity edema.  ABDOMEN: Soft, nondistended, nontender. Bowel sounds present. No  hepatosplenomegaly.      ASSESSMENT AND PLAN:  1. Annual exam  2. Pulmonary embolus- on eliquis - to see vascular medicine  3. History of stroke. on aspirin.  Colonoscopy normal 6/2011  I will see him back in a year. I will check a CBC, CMP, TSH, PSA, lipids and vitamin D level.

## 2017-01-31 NOTE — MR AVS SNAPSHOT
[facility] - Internal Medicine  [address]  [facility] [address]  Phone: [phone]  Fax: [phone]                  Suraj Constantino   2017 9:30 AM   Office Visit    Description:  Male : 1960   Provider:  Corinne Ramirez MD   Department:  [facility] - Internal Medicine           Reason for Visit     Annual Exam           Diagnoses this Visit        Comments    Acute septic pulmonary embolism without acute cor pulmonale    -  Primary            To Do List           Future Appointments        Provider Department Dept Phone    2017 8:30 AM MD [facility] - [facility] [phone]    3/17/2017 1:30 PM Corinne Ramirez MD [facility] Internal Medicine [phone]      Goals (5 Years of Data)     None       These Medications        Disp Refills Start End    apixaban 5 mg Tab 60 tablet 5 2017     Take 1 tablet (5 mg total) by mouth 2 (two) times daily. - Oral    Pharmacy: [facility] [place] - [address] Ph #: [phone]         [facility] On Call     [facility] On Call Nurse Care Line -  Assistance  Registered nurses in the [facility] Center provide clinical advisement, health education, appointment booking, and other advisory services.  Call for this free service at [phone].             Medications           Message regarding Medications     Verify the changes and/or additions to your medication regime listed below are the same as discussed with your clinician today.  If any of these changes or additions are incorrect, please notify your healthcare provider.        CHANGE how you are taking these medications     Start Taking Instead of    apixaban 5 mg Tab apixaban 5 mg Tab    Dosage:  Take 1 tablet (5 mg total) by mouth 2 (two) times daily. Dosage:  Take 2 tablets (10 mg total) in AM and 2 tablets (10 mg total) in PM for first 7 days by mouth. Then take 1 tablet (5 mg total) in AM and 1 tablet (5 mg  "total) in PM daily by mouth.    Reason for Change:  Reorder            Verify that the below list of medications is an accurate representation of the medications you are currently taking.  If none reported, the list may be blank. If incorrect, please contact your healthcare provider. Carry this list with you in case of emergency.           Current Medications     acetaminophen (TYLENOL) 500 MG tablet Take 1,000 mg by mouth daily as needed for Pain.    apixaban 5 mg Tab Take 1 tablet (5 mg total) by mouth 2 (two) times daily.    aspirin (ECOTRIN) 81 MG EC tablet Take 81 mg by mouth once daily.    hydrocodone-acetaminophen 5-325mg (NORCO) 5-325 mg per tablet Take 1 tablet by mouth every 6 (six) hours as needed.           Clinical Reference Information           Vital Signs - Last Recorded  Most recent update: 1/31/2017 10:39 AM by Corinne Ramirez MD    BP Pulse Ht Wt SpO2 BMI    110/60 66 5' 9" (1.753 m) 85.4 kg (188 lb 4.4 oz) 95% 27.8 kg/m2      Blood Pressure          Most Recent Value    BP  110/60      Allergies as of 1/31/2017     Pcn [Penicillins]      Immunizations Administered on Date of Encounter - 1/31/2017     None      Orders Placed During Today's Visit      Normal Orders This Visit    Ambulatory consult to Vascular Medicine       "

## 2019-06-13 ENCOUNTER — LAB VISIT (OUTPATIENT)
Dept: LAB | Facility: HOSPITAL | Age: 59
End: 2019-06-13
Attending: INTERNAL MEDICINE
Payer: COMMERCIAL

## 2019-06-13 ENCOUNTER — OFFICE VISIT (OUTPATIENT)
Dept: INTERNAL MEDICINE | Facility: CLINIC | Age: 59
End: 2019-06-13
Payer: COMMERCIAL

## 2019-06-13 VITALS
HEIGHT: 69 IN | DIASTOLIC BLOOD PRESSURE: 70 MMHG | WEIGHT: 189.13 LBS | HEART RATE: 61 BPM | OXYGEN SATURATION: 96 % | BODY MASS INDEX: 28.01 KG/M2 | SYSTOLIC BLOOD PRESSURE: 114 MMHG

## 2019-06-13 DIAGNOSIS — Z00.00 ROUTINE GENERAL MEDICAL EXAMINATION AT A HEALTH CARE FACILITY: ICD-10-CM

## 2019-06-13 DIAGNOSIS — R07.9 CHEST PAIN, UNSPECIFIED TYPE: ICD-10-CM

## 2019-06-13 DIAGNOSIS — Z00.00 ROUTINE GENERAL MEDICAL EXAMINATION AT A HEALTH CARE FACILITY: Primary | ICD-10-CM

## 2019-06-13 DIAGNOSIS — D68.9 CLOTTING DISORDER: ICD-10-CM

## 2019-06-13 DIAGNOSIS — Z01.00 EYE EXAM, ROUTINE: ICD-10-CM

## 2019-06-13 DIAGNOSIS — H91.90 HEARING LOSS, UNSPECIFIED HEARING LOSS TYPE, UNSPECIFIED LATERALITY: ICD-10-CM

## 2019-06-13 LAB
25(OH)D3+25(OH)D2 SERPL-MCNC: 33 NG/ML (ref 30–96)
ALBUMIN SERPL BCP-MCNC: 4.2 G/DL (ref 3.5–5.2)
ALP SERPL-CCNC: 68 U/L (ref 55–135)
ALT SERPL W/O P-5'-P-CCNC: 26 U/L (ref 10–44)
ANION GAP SERPL CALC-SCNC: 8 MMOL/L (ref 8–16)
AST SERPL-CCNC: 23 U/L (ref 10–40)
BASOPHILS # BLD AUTO: 0.03 K/UL (ref 0–0.2)
BASOPHILS NFR BLD: 0.8 % (ref 0–1.9)
BILIRUB SERPL-MCNC: 0.5 MG/DL (ref 0.1–1)
BUN SERPL-MCNC: 9 MG/DL (ref 6–20)
CALCIUM SERPL-MCNC: 10.2 MG/DL (ref 8.7–10.5)
CHLORIDE SERPL-SCNC: 106 MMOL/L (ref 95–110)
CHOLEST SERPL-MCNC: 171 MG/DL (ref 120–199)
CHOLEST/HDLC SERPL: 3.8 {RATIO} (ref 2–5)
CO2 SERPL-SCNC: 28 MMOL/L (ref 23–29)
COMPLEXED PSA SERPL-MCNC: 1.2 NG/ML (ref 0–4)
CREAT SERPL-MCNC: 1.3 MG/DL (ref 0.5–1.4)
CRP SERPL-MCNC: 3 MG/L (ref 0–8.2)
DIFFERENTIAL METHOD: ABNORMAL
EOSINOPHIL # BLD AUTO: 0.1 K/UL (ref 0–0.5)
EOSINOPHIL NFR BLD: 3 % (ref 0–8)
ERYTHROCYTE [DISTWIDTH] IN BLOOD BY AUTOMATED COUNT: 13.8 % (ref 11.5–14.5)
ERYTHROCYTE [SEDIMENTATION RATE] IN BLOOD BY WESTERGREN METHOD: 22 MM/HR (ref 0–23)
EST. GFR  (AFRICAN AMERICAN): >60 ML/MIN/1.73 M^2
EST. GFR  (NON AFRICAN AMERICAN): >60 ML/MIN/1.73 M^2
GLUCOSE SERPL-MCNC: 85 MG/DL (ref 70–110)
HCT VFR BLD AUTO: 45.8 % (ref 40–54)
HCV AB SERPL QL IA: NEGATIVE
HDLC SERPL-MCNC: 45 MG/DL (ref 40–75)
HDLC SERPL: 26.3 % (ref 20–50)
HGB BLD-MCNC: 14.9 G/DL (ref 14–18)
LDLC SERPL CALC-MCNC: 107.6 MG/DL (ref 63–159)
LYMPHOCYTES # BLD AUTO: 1.6 K/UL (ref 1–4.8)
LYMPHOCYTES NFR BLD: 44.1 % (ref 18–48)
MAGNESIUM SERPL-MCNC: 2.3 MG/DL (ref 1.6–2.6)
MCH RBC QN AUTO: 28.4 PG (ref 27–31)
MCHC RBC AUTO-ENTMCNC: 32.5 G/DL (ref 32–36)
MCV RBC AUTO: 87 FL (ref 82–98)
MONOCYTES # BLD AUTO: 0.6 K/UL (ref 0.3–1)
MONOCYTES NFR BLD: 16.8 % (ref 4–15)
NEUTROPHILS # BLD AUTO: 1.3 K/UL (ref 1.8–7.7)
NEUTROPHILS NFR BLD: 35 % (ref 38–73)
NONHDLC SERPL-MCNC: 126 MG/DL
PLATELET # BLD AUTO: 148 K/UL (ref 150–350)
PMV BLD AUTO: 11.2 FL (ref 9.2–12.9)
POTASSIUM SERPL-SCNC: 4 MMOL/L (ref 3.5–5.1)
PROT SERPL-MCNC: 7.8 G/DL (ref 6–8.4)
RBC # BLD AUTO: 5.24 M/UL (ref 4.6–6.2)
SODIUM SERPL-SCNC: 142 MMOL/L (ref 136–145)
TRIGL SERPL-MCNC: 92 MG/DL (ref 30–150)
TSH SERPL DL<=0.005 MIU/L-ACNC: 1.67 UIU/ML (ref 0.4–4)
URATE SERPL-MCNC: 5.5 MG/DL (ref 3.4–7)
VIT B12 SERPL-MCNC: 538 PG/ML (ref 210–950)
WBC # BLD AUTO: 3.7 K/UL (ref 3.9–12.7)

## 2019-06-13 PROCEDURE — 80061 LIPID PANEL: CPT

## 2019-06-13 PROCEDURE — 99396 PREV VISIT EST AGE 40-64: CPT | Mod: S$GLB,,, | Performed by: INTERNAL MEDICINE

## 2019-06-13 PROCEDURE — 99999 PR PBB SHADOW E&M-EST. PATIENT-LVL IV: ICD-10-PCS | Mod: PBBFAC,,, | Performed by: INTERNAL MEDICINE

## 2019-06-13 PROCEDURE — 83735 ASSAY OF MAGNESIUM: CPT

## 2019-06-13 PROCEDURE — 86803 HEPATITIS C AB TEST: CPT

## 2019-06-13 PROCEDURE — 99999 PR PBB SHADOW E&M-EST. PATIENT-LVL IV: CPT | Mod: PBBFAC,,, | Performed by: INTERNAL MEDICINE

## 2019-06-13 PROCEDURE — 84443 ASSAY THYROID STIM HORMONE: CPT

## 2019-06-13 PROCEDURE — 84153 ASSAY OF PSA TOTAL: CPT

## 2019-06-13 PROCEDURE — 82607 VITAMIN B-12: CPT

## 2019-06-13 PROCEDURE — 85652 RBC SED RATE AUTOMATED: CPT

## 2019-06-13 PROCEDURE — 82306 VITAMIN D 25 HYDROXY: CPT

## 2019-06-13 PROCEDURE — 81241 F5 GENE: CPT

## 2019-06-13 PROCEDURE — 99396 PR PREVENTIVE VISIT,EST,40-64: ICD-10-PCS | Mod: S$GLB,,, | Performed by: INTERNAL MEDICINE

## 2019-06-13 PROCEDURE — 84550 ASSAY OF BLOOD/URIC ACID: CPT

## 2019-06-13 PROCEDURE — 86038 ANTINUCLEAR ANTIBODIES: CPT

## 2019-06-13 PROCEDURE — 80053 COMPREHEN METABOLIC PANEL: CPT

## 2019-06-13 PROCEDURE — 85307 ASSAY ACTIVATED PROTEIN C: CPT

## 2019-06-13 PROCEDURE — 85025 COMPLETE CBC W/AUTO DIFF WBC: CPT

## 2019-06-13 PROCEDURE — 86140 C-REACTIVE PROTEIN: CPT

## 2019-06-13 PROCEDURE — 36415 COLL VENOUS BLD VENIPUNCTURE: CPT

## 2019-06-13 NOTE — PROGRESS NOTES
CHIEF COMPLAINT: Annual exam     HISTORY OF PRESENT ILLNESS: This is a 56 year old who presents for his annual physical.    I last saw him Jan 2017.  At that time he presented to the ED on 1/1/17 due to right sided chest pain. He was diagnosed with a right sided pulmonary embolus. He stayed overnight in the hospital due to low oxygen levels. He was discharged the next day on Eliquis 5 mg twice daily. He took one prescription of the Eliquis.  The right chest pain resolved and never came back. He does not have chest pain, shortness of breath.  He has been feeling well. He occasionally takes an aspirin daily.     His sister has cutaneous lupus. Mother had a blood clot at age 85 after a long airplane flight. Brother age 68 just had a couple strokes and is now in a wheelchair.  He was drinker.     He has had lower back stiffness. He tries to stretch his back. He has been drinking extra water which has helped his back. He works in the heat. He is a  and .  He works in a refinery.  HE has muscle cramps in his his legs at night, especially during intercourse.  Leg cramps have occurred since he was very young.  He has tried over the counter potassium supplement which did not help. No palpitations, nausea, vomiting, fever, chills, constipation, diarrhea, dysuria, hematuria, joint pain or muscle pain.      HE works in [place] - drives 3 hours to work then works 10 hours a day for 5-6 days then drives back to [place].  He will sleep 6-7 hours in between shifts.  Diet is not that good while he is working.  He is tired.  No shortness of breath upon exertion. HE can drive 9 hours straight without stopping if he has to.     He has occasional left foot pain. He saw a podiatrist in [place]. He has some arthritis in the left foot He had injection in the left foot which did not help. He has a pressure sensation on the top of the left foot. It comes and goes.  No swelling or redness. This has been going on a  "long time. He can have the pain daily.     He has a very strenuous job. He cannot get rid of his gut.    He drinks very little alcohol.  Never smoked.     Vision is getting worse. Hearing is getting worse.     HE gets up once at night to urinate. He has decreased urinary stream.      PAST MEDICAL HISTORY:  1. Acute stroke in the left frontoparietal region, June 2007. Symptoms resolved upon discharge from the hospital. MRI was consistent with acute stroke.   2. Suggested carrier for factor V Leiden mutation in June 2007 at the time of his stroke. HE had a low activated protein C level at 1.8 with negative gene mutation.  3. Lymph node biopsy in 1989 that was benign.  4. History of leukopenia. He reports he had a workup at [facility] that was negative.  5. Pulmonary embolus Jan 2017.      MEDS AND ALLERGIES: Updated on EPIC   He does not smoke. . He is  with 3 children. He works in a refinery as a .      REVIEW OF SYSTEMS: He denies fevers, chills, night sweats, visual change, hearing loss, sinus congestion, sore throat, chest pain, shortness of breath, nausea, vomiting,constipation, diarrhea, dysuria, hematuria, nocturia, joint pain, other joint pain or muscle pain, polydipsia, polyuria, hot or cold intolerance, anxiety, depression, insomnia.      PHYSICAL EXAM:   /70   Pulse 61   Ht 5' 9" (1.753 m)   Wt 85.8 kg (189 lb 2.5 oz)   SpO2 96%   BMI 27.93 kg/m²        GENERAL: Alert, oriented. No apparent distress. Affect within normal  limits. Conjunctivae anicteric. Tympanic membranes clear. Oropharynx  clear.  NECK: Supple. Respiratory effort normal. Lungs clear to auscultation.  HEART: Regular rate and rhythm without murmurs, gallops, or rubs. No lower  extremity edema.  ABDOMEN: Soft, nondistended, nontender. Bowel sounds present. No  hepatosplenomegaly.        ASSESSMENT AND PLAN:  1. Annual exam  2. History of stroke and Pulmonary embolus- repeat factor V leiden and activated " protein C. Take aspirin 325 mg daily. Stop every 2 hours while driving and move around.   3. Decreased urinary stream- urinate when feels need to urinate and not hold urination.  4. History chest pain with decreased exercise capicaty with history of pulmonary embolus - stress echo.   Eye exam and hearing test.   Colonoscopy normal 6/2011  I will see him back in a year. I will check a CBC, CMP, TSH, PSA, lipids and vitamin D level.

## 2019-06-14 LAB
ANA SER QL IF: NORMAL
APC INTERPRETATION: NORMAL
APCR PPP: 2.9 {RATIO}

## 2019-06-17 LAB — F5 P.R506Q BLD/T QL: NORMAL

## 2019-06-20 ENCOUNTER — TELEPHONE (OUTPATIENT)
Dept: AUDIOLOGY | Facility: CLINIC | Age: 59
End: 2019-06-20

## 2019-07-05 ENCOUNTER — CLINICAL SUPPORT (OUTPATIENT)
Dept: AUDIOLOGY | Facility: CLINIC | Age: 59
End: 2019-07-05
Payer: COMMERCIAL

## 2019-07-05 ENCOUNTER — HOSPITAL ENCOUNTER (OUTPATIENT)
Dept: CARDIOLOGY | Facility: CLINIC | Age: 59
Discharge: HOME | End: 2019-07-05
Attending: INTERNAL MEDICINE
Payer: COMMERCIAL

## 2019-07-05 VITALS — HEIGHT: 69 IN | WEIGHT: 198 LBS | BODY MASS INDEX: 29.33 KG/M2

## 2019-07-05 DIAGNOSIS — H90.3 SENSORINEURAL HEARING LOSS, BILATERAL: Primary | ICD-10-CM

## 2019-07-05 DIAGNOSIS — R07.9 CHEST PAIN, UNSPECIFIED TYPE: ICD-10-CM

## 2019-07-05 LAB
ASCENDING AORTA: 3.26 CM
BSA FOR ECHO PROCEDURE: 2.09 M2
CV ECHO LV RWT: 0.43 CM
CV STRESS BASE HR: 53 BPM
DIASTOLIC BLOOD PRESSURE: 83 MMHG
DOP CALC LVOT AREA: 3.5 CM2
DOP CALC LVOT DIAMETER: 2.11 CM
DOP CALC LVOT PEAK VEL: 0.78 M/S
DOP CALC LVOT STROKE VOLUME: 65.28 CM3
DOP CALCLVOT PEAK VEL VTI: 18.68 CM
E WAVE DECELERATION TIME: 190.41 MSEC
E/A RATIO: 1.08
E/E' RATIO: 7.11 M/S
ECHO LV POSTERIOR WALL: 1 CM (ref 0.6–1.1)
FRACTIONAL SHORTENING: 34 % (ref 28–44)
INTERVENTRICULAR SEPTUM: 0.95 CM (ref 0.6–1.1)
IVRT: 0.11 MSEC
LA MAJOR: 5.17 CM
LA MINOR: 5.2 CM
LA WIDTH: 4.16 CM
LEFT ATRIUM SIZE: 3.23 CM
LEFT ATRIUM VOLUME INDEX: 28.8 ML/M2
LEFT ATRIUM VOLUME: 59.22 CM3
LEFT INTERNAL DIMENSION IN SYSTOLE: 3.08 CM (ref 2.1–4)
LEFT VENTRICLE DIASTOLIC VOLUME INDEX: 48.82 ML/M2
LEFT VENTRICLE DIASTOLIC VOLUME: 100.41 ML
LEFT VENTRICLE MASS INDEX: 76 G/M2
LEFT VENTRICLE SYSTOLIC VOLUME INDEX: 18.1 ML/M2
LEFT VENTRICLE SYSTOLIC VOLUME: 37.21 ML
LEFT VENTRICULAR INTERNAL DIMENSION IN DIASTOLE: 4.66 CM (ref 3.5–6)
LEFT VENTRICULAR MASS: 156.69 G
LV LATERAL E/E' RATIO: 6.4 M/S
LV SEPTAL E/E' RATIO: 8 M/S
MV PEAK A VEL: 0.59 M/S
MV PEAK E VEL: 0.64 M/S
OHS CV CPX 1 MINUTE RECOVERY HEART RATE: 111 BPM
OHS CV CPX 85 PERCENT MAX PREDICTED HEART RATE MALE: 138
OHS CV CPX ESTIMATED METS: 13
OHS CV CPX MAX PREDICTED HEART RATE: 162
OHS CV CPX PATIENT IS FEMALE: 0
OHS CV CPX PATIENT IS MALE: 1
OHS CV CPX PEAK DIASTOLIC BLOOD PRESSURE: 62 MMHG
OHS CV CPX PEAK HEAR RATE: 153 BPM
OHS CV CPX PEAK RATE PRESSURE PRODUCT: NORMAL
OHS CV CPX PEAK SYSTOLIC BLOOD PRESSURE: 157 MMHG
OHS CV CPX PERCENT MAX PREDICTED HEART RATE ACHIEVED: 94
OHS CV CPX RATE PRESSURE PRODUCT PRESENTING: 6095
PISA TR MAX VEL: 2.37 M/S
PULM VEIN S/D RATIO: 1.21
PV PEAK D VEL: 0.47 M/S
PV PEAK S VEL: 0.57 M/S
RA MAJOR: 5.07 CM
RA PRESSURE: 3 MMHG
RA WIDTH: 4.02 CM
RIGHT VENTRICULAR END-DIASTOLIC DIMENSION: 3.55 CM
RV TISSUE DOPPLER FREE WALL SYSTOLIC VELOCITY 1 (APICAL 4 CHAMBER VIEW): 11.34 CM/S
SINUS: 3.59 CM
STJ: 3.19 CM
STRESS ECHO POST EXERCISE DUR MIN: 7 MINUTES
STRESS ECHO POST EXERCISE DUR SEC: 44 SECONDS
STRESS ST DEPRESSION: 0.5 MM
SYSTOLIC BLOOD PRESSURE: 115 MMHG
TDI LATERAL: 0.1 M/S
TDI SEPTAL: 0.08 M/S
TDI: 0.09 M/S
TR MAX PG: 22 MMHG
TRICUSPID ANNULAR PLANE SYSTOLIC EXCURSION: 2.39 CM
TV REST PULMONARY ARTERY PRESSURE: 25 MMHG

## 2019-07-05 PROCEDURE — 93351 STRESS TTE COMPLETE: CPT | Mod: S$GLB,,, | Performed by: INTERNAL MEDICINE

## 2019-07-05 PROCEDURE — 92557 PR COMPREHENSIVE HEARING TEST: ICD-10-PCS | Mod: S$GLB,,, | Performed by: AUDIOLOGIST

## 2019-07-05 PROCEDURE — 93351 ECHOCARDIOGRAM STRESS TEST (CUPID ONLY): ICD-10-PCS | Mod: S$GLB,,, | Performed by: INTERNAL MEDICINE

## 2019-07-05 PROCEDURE — 92557 COMPREHENSIVE HEARING TEST: CPT | Mod: S$GLB,,, | Performed by: AUDIOLOGIST

## 2019-07-08 ENCOUNTER — TELEPHONE (OUTPATIENT)
Dept: INTERNAL MEDICINE | Facility: CLINIC | Age: 59
End: 2019-07-08

## 2019-07-08 RX ORDER — SILDENAFIL CITRATE 20 MG/1
TABLET ORAL
Qty: 90 TABLET | Refills: 1 | Status: SHIPPED | OUTPATIENT
Start: 2019-07-08 | End: 2021-01-24 | Stop reason: CLARIF

## 2019-07-08 NOTE — TELEPHONE ENCOUNTER
----- Message from Jenny Andujar sent at 7/8/2019 11:27 AM CDT -----  Contact: ADEBAYO SOLIZ      Type:  Patient Returning Call    Who Called: ADEBAYO SOLIZ     Who Left Message for Patient:Unknown    Does the patient know what this is regarding? Test    Best Call Back Number:[phone]    Additional Information: n/a

## 2019-07-08 NOTE — TELEPHONE ENCOUNTER
----- Message from Ling Roman sent at 7/8/2019 12:13 PM CDT -----  Contact: Suraj [phone]  Type: Patient Call Back    Who called:Suraj    What is the request in detail: The patient is requesting a call back from the staff, in regards to the results from his hearing test    Can the clinic reply by [facility]?no    Would the patient rather a call back or a response via [facility]? Call back    Best call back number:[phone]

## 2019-07-08 NOTE — TELEPHONE ENCOUNTER
----- Message from Corinne Ramirez MD sent at 7/7/2019  8:25 PM CDT -----  Please notify pt that     Your stress test is fine. Your heart is fine.    Corinne Ramirez M.D.

## 2019-07-08 NOTE — TELEPHONE ENCOUNTER
Pt advised regarding stress test. Pt would also like to get a new Rx for Viagra. Pt states that PCP has filled this before

## 2020-10-05 ENCOUNTER — PATIENT MESSAGE (OUTPATIENT)
Dept: ADMINISTRATIVE | Facility: HOSPITAL | Age: 60
End: 2020-10-05

## 2021-01-26 DIAGNOSIS — U07.1 COVID-19 VIRUS DETECTED: ICD-10-CM

## 2021-02-05 ENCOUNTER — NURSE TRIAGE (OUTPATIENT)
Dept: ADMINISTRATIVE | Facility: CLINIC | Age: 61
End: 2021-02-05

## 2021-02-12 ENCOUNTER — HOSPITAL ENCOUNTER (OUTPATIENT)
Dept: RADIOLOGY | Facility: HOSPITAL | Age: 61
Discharge: HOME | End: 2021-02-12
Attending: INTERNAL MEDICINE
Payer: MEDICAID

## 2021-02-12 ENCOUNTER — OFFICE VISIT (OUTPATIENT)
Dept: INTERNAL MEDICINE | Facility: CLINIC | Age: 61
End: 2021-02-12
Payer: MEDICAID

## 2021-02-12 VITALS
OXYGEN SATURATION: 96 % | HEIGHT: 69 IN | DIASTOLIC BLOOD PRESSURE: 70 MMHG | WEIGHT: 186.5 LBS | SYSTOLIC BLOOD PRESSURE: 106 MMHG | BODY MASS INDEX: 27.62 KG/M2 | HEART RATE: 72 BPM

## 2021-02-12 DIAGNOSIS — M25.511 RIGHT SHOULDER PAIN, UNSPECIFIED CHRONICITY: ICD-10-CM

## 2021-02-12 DIAGNOSIS — M54.50 INTERMITTENT LOW BACK PAIN: ICD-10-CM

## 2021-02-12 DIAGNOSIS — Z01.00 EYE EXAM, ROUTINE: Primary | ICD-10-CM

## 2021-02-12 DIAGNOSIS — Z12.11 SCREENING FOR MALIGNANT NEOPLASM OF COLON: ICD-10-CM

## 2021-02-12 DIAGNOSIS — H91.90 HEARING LOSS, UNSPECIFIED HEARING LOSS TYPE, UNSPECIFIED LATERALITY: ICD-10-CM

## 2021-02-12 DIAGNOSIS — Z00.00 ROUTINE GENERAL MEDICAL EXAMINATION AT A HEALTH CARE FACILITY: ICD-10-CM

## 2021-02-12 PROCEDURE — 99999 PR PBB SHADOW E&M-EST. PATIENT-LVL IV: CPT | Mod: PBBFAC,,, | Performed by: INTERNAL MEDICINE

## 2021-02-12 PROCEDURE — 99999 PR PBB SHADOW E&M-EST. PATIENT-LVL IV: ICD-10-PCS | Mod: PBBFAC,,, | Performed by: INTERNAL MEDICINE

## 2021-02-12 PROCEDURE — 73030 X-RAY EXAM OF SHOULDER: CPT | Mod: TC,RT

## 2021-02-12 PROCEDURE — 72100 XR LUMBAR SPINE AP AND LATERAL: ICD-10-PCS | Mod: 26,,, | Performed by: RADIOLOGY

## 2021-02-12 PROCEDURE — 99396 PR PREVENTIVE VISIT,EST,40-64: ICD-10-PCS | Mod: S$PBB,,, | Performed by: INTERNAL MEDICINE

## 2021-02-12 PROCEDURE — 72100 X-RAY EXAM L-S SPINE 2/3 VWS: CPT | Mod: TC

## 2021-02-12 PROCEDURE — 99214 OFFICE O/P EST MOD 30 MIN: CPT | Mod: PBBFAC,25 | Performed by: INTERNAL MEDICINE

## 2021-02-12 PROCEDURE — 99396 PREV VISIT EST AGE 40-64: CPT | Mod: S$PBB,,, | Performed by: INTERNAL MEDICINE

## 2021-02-12 PROCEDURE — 73030 XR SHOULDER COMPLETE 2 OR MORE VIEWS RIGHT: ICD-10-PCS | Mod: 26,RT,, | Performed by: RADIOLOGY

## 2021-02-12 PROCEDURE — 72100 X-RAY EXAM L-S SPINE 2/3 VWS: CPT | Mod: 26,,, | Performed by: RADIOLOGY

## 2021-02-12 PROCEDURE — 73030 X-RAY EXAM OF SHOULDER: CPT | Mod: 26,RT,, | Performed by: RADIOLOGY

## 2021-02-12 RX ORDER — MELOXICAM 15 MG/1
15 TABLET ORAL DAILY
Qty: 30 TABLET | Refills: 1 | Status: SHIPPED | OUTPATIENT
Start: 2021-02-12 | End: 2021-02-12 | Stop reason: SDUPTHER

## 2021-02-12 RX ORDER — METHOCARBAMOL 500 MG/1
500 TABLET, FILM COATED ORAL 3 TIMES DAILY PRN
Qty: 90 TABLET | Refills: 0 | Status: SHIPPED | OUTPATIENT
Start: 2021-02-12 | End: 2021-05-10

## 2021-02-12 RX ORDER — METHOCARBAMOL 500 MG/1
500 TABLET, FILM COATED ORAL 3 TIMES DAILY PRN
Qty: 50 TABLET | Refills: 0 | Status: SHIPPED | OUTPATIENT
Start: 2021-02-12 | End: 2021-02-12 | Stop reason: SDUPTHER

## 2021-02-12 RX ORDER — MELOXICAM 15 MG/1
15 TABLET ORAL DAILY
Qty: 30 TABLET | Refills: 1 | Status: SHIPPED | OUTPATIENT
Start: 2021-02-12 | End: 2023-03-03 | Stop reason: SDUPTHER

## 2021-02-13 ENCOUNTER — TELEPHONE (OUTPATIENT)
Dept: INTERNAL MEDICINE | Facility: CLINIC | Age: 61
End: 2021-02-13

## 2021-02-21 ENCOUNTER — PATIENT OUTREACH (OUTPATIENT)
Dept: ADMINISTRATIVE | Facility: OTHER | Age: 61
End: 2021-02-21

## 2021-02-22 ENCOUNTER — OFFICE VISIT (OUTPATIENT)
Dept: OPTOMETRY | Facility: CLINIC | Age: 61
End: 2021-02-22
Payer: MEDICAID

## 2021-02-22 DIAGNOSIS — Z01.00 EYE EXAM, ROUTINE: ICD-10-CM

## 2021-02-22 DIAGNOSIS — H52.203 ASTIGMATISM OF BOTH EYES WITH PRESBYOPIA: Primary | ICD-10-CM

## 2021-02-22 DIAGNOSIS — H52.4 ASTIGMATISM OF BOTH EYES WITH PRESBYOPIA: Primary | ICD-10-CM

## 2021-02-22 PROCEDURE — 99999 PR PBB SHADOW E&M-EST. PATIENT-LVL II: CPT | Mod: PBBFAC,,, | Performed by: OPTOMETRIST

## 2021-02-22 PROCEDURE — 92015 PR REFRACTION: ICD-10-PCS | Mod: ,,, | Performed by: OPTOMETRIST

## 2021-02-22 PROCEDURE — 99212 OFFICE O/P EST SF 10 MIN: CPT | Mod: PBBFAC,PO | Performed by: OPTOMETRIST

## 2021-02-22 PROCEDURE — 99999 PR PBB SHADOW E&M-EST. PATIENT-LVL II: ICD-10-PCS | Mod: PBBFAC,,, | Performed by: OPTOMETRIST

## 2021-02-22 PROCEDURE — 92004 PR EYE EXAM, NEW PATIENT,COMPREHESV: ICD-10-PCS | Mod: S$PBB,,, | Performed by: OPTOMETRIST

## 2021-02-22 PROCEDURE — 92015 DETERMINE REFRACTIVE STATE: CPT | Mod: ,,, | Performed by: OPTOMETRIST

## 2021-02-22 PROCEDURE — 92004 COMPRE OPH EXAM NEW PT 1/>: CPT | Mod: S$PBB,,, | Performed by: OPTOMETRIST

## 2021-02-26 ENCOUNTER — TELEPHONE (OUTPATIENT)
Dept: INTERNAL MEDICINE | Facility: CLINIC | Age: 61
End: 2021-02-26

## 2021-03-01 ENCOUNTER — TELEPHONE (OUTPATIENT)
Dept: INTERNAL MEDICINE | Facility: CLINIC | Age: 61
End: 2021-03-01

## 2021-03-01 DIAGNOSIS — R79.89 LOW TESTOSTERONE: Primary | ICD-10-CM

## 2021-03-03 DIAGNOSIS — R53.83 FATIGUE, UNSPECIFIED TYPE: Primary | ICD-10-CM

## 2021-03-22 ENCOUNTER — TELEPHONE (OUTPATIENT)
Dept: UROLOGY | Facility: CLINIC | Age: 61
End: 2021-03-22

## 2021-03-22 DIAGNOSIS — R53.83 FATIGUE, UNSPECIFIED TYPE: Primary | ICD-10-CM

## 2021-03-23 ENCOUNTER — TELEPHONE (OUTPATIENT)
Dept: UROLOGY | Facility: CLINIC | Age: 61
End: 2021-03-23

## 2021-03-26 ENCOUNTER — OFFICE VISIT (OUTPATIENT)
Dept: UROLOGY | Facility: CLINIC | Age: 61
End: 2021-03-26
Payer: MEDICAID

## 2021-03-26 ENCOUNTER — CLINICAL SUPPORT (OUTPATIENT)
Dept: AUDIOLOGY | Facility: CLINIC | Age: 61
End: 2021-03-26
Payer: MEDICAID

## 2021-03-26 VITALS
HEIGHT: 69 IN | DIASTOLIC BLOOD PRESSURE: 71 MMHG | BODY MASS INDEX: 27.43 KG/M2 | WEIGHT: 185.19 LBS | SYSTOLIC BLOOD PRESSURE: 102 MMHG | RESPIRATION RATE: 16 BRPM | HEART RATE: 60 BPM

## 2021-03-26 DIAGNOSIS — R79.89 LOW TESTOSTERONE: Primary | ICD-10-CM

## 2021-03-26 DIAGNOSIS — H91.90 HEARING LOSS, UNSPECIFIED HEARING LOSS TYPE, UNSPECIFIED LATERALITY: ICD-10-CM

## 2021-03-26 DIAGNOSIS — H90.3 SENSORINEURAL HEARING LOSS, BILATERAL: Primary | ICD-10-CM

## 2021-03-26 PROCEDURE — 99203 OFFICE O/P NEW LOW 30 MIN: CPT | Mod: S$PBB,,, | Performed by: NURSE PRACTITIONER

## 2021-03-26 PROCEDURE — 99999 PR PBB SHADOW E&M-EST. PATIENT-LVL IV: CPT | Mod: PBBFAC,,, | Performed by: NURSE PRACTITIONER

## 2021-03-26 PROCEDURE — 92567 TYMPANOMETRY: CPT | Mod: PBBFAC | Performed by: AUDIOLOGIST

## 2021-03-26 PROCEDURE — 92557 COMPREHENSIVE HEARING TEST: CPT | Mod: PBBFAC | Performed by: AUDIOLOGIST

## 2021-03-26 PROCEDURE — 99999 PR PBB SHADOW E&M-EST. PATIENT-LVL IV: ICD-10-PCS | Mod: PBBFAC,,, | Performed by: NURSE PRACTITIONER

## 2021-03-26 PROCEDURE — 99211 OFF/OP EST MAY X REQ PHY/QHP: CPT | Mod: PBBFAC | Performed by: AUDIOLOGIST

## 2021-03-26 PROCEDURE — 99999 PR PBB SHADOW E&M-EST. PATIENT-LVL I: CPT | Mod: PBBFAC,,, | Performed by: AUDIOLOGIST

## 2021-03-26 PROCEDURE — 99203 PR OFFICE/OUTPT VISIT, NEW, LEVL III, 30-44 MIN: ICD-10-PCS | Mod: S$PBB,,, | Performed by: NURSE PRACTITIONER

## 2021-03-26 PROCEDURE — 99214 OFFICE O/P EST MOD 30 MIN: CPT | Mod: PBBFAC,27,PN,25 | Performed by: NURSE PRACTITIONER

## 2021-03-26 PROCEDURE — 99999 PR PBB SHADOW E&M-EST. PATIENT-LVL I: ICD-10-PCS | Mod: PBBFAC,,, | Performed by: AUDIOLOGIST

## 2021-03-29 ENCOUNTER — TELEPHONE (OUTPATIENT)
Dept: INTERNAL MEDICINE | Facility: CLINIC | Age: 61
End: 2021-03-29

## 2021-03-29 ENCOUNTER — TELEPHONE (OUTPATIENT)
Dept: ENDOSCOPY | Facility: HOSPITAL | Age: 61
End: 2021-03-29

## 2021-03-29 DIAGNOSIS — Z12.11 SPECIAL SCREENING FOR MALIGNANT NEOPLASMS, COLON: Primary | ICD-10-CM

## 2021-03-29 RX ORDER — SODIUM, POTASSIUM,MAG SULFATES 17.5-3.13G
1 SOLUTION, RECONSTITUTED, ORAL ORAL DAILY
Qty: 1 KIT | Refills: 0 | Status: SHIPPED | OUTPATIENT
Start: 2021-03-29 | End: 2021-03-31

## 2021-03-29 RX ORDER — ASPIRIN 81 MG/1
81 TABLET ORAL DAILY
COMMUNITY
End: 2024-02-05 | Stop reason: CLARIF

## 2021-03-30 ENCOUNTER — TELEPHONE (OUTPATIENT)
Dept: INTERNAL MEDICINE | Facility: CLINIC | Age: 61
End: 2021-03-30

## 2021-03-30 DIAGNOSIS — R40.0 DAYTIME SOMNOLENCE: Primary | ICD-10-CM

## 2021-04-05 ENCOUNTER — TELEPHONE (OUTPATIENT)
Dept: UROLOGY | Facility: CLINIC | Age: 61
End: 2021-04-05

## 2021-04-06 ENCOUNTER — TELEPHONE (OUTPATIENT)
Dept: UROLOGY | Facility: CLINIC | Age: 61
End: 2021-04-06

## 2021-04-07 ENCOUNTER — PATIENT OUTREACH (OUTPATIENT)
Dept: ADMINISTRATIVE | Facility: OTHER | Age: 61
End: 2021-04-07

## 2021-04-09 DIAGNOSIS — R79.89 LOW TESTOSTERONE: Primary | ICD-10-CM

## 2021-04-23 ENCOUNTER — HOSPITAL ENCOUNTER (OUTPATIENT)
Facility: HOSPITAL | Age: 61
Discharge: HOME | End: 2021-04-23
Attending: SURGERY | Admitting: SURGERY
Payer: MEDICAID

## 2021-04-23 ENCOUNTER — ANESTHESIA EVENT (OUTPATIENT)
Dept: ENDOSCOPY | Facility: HOSPITAL | Age: 61
End: 2021-04-23
Payer: MEDICAID

## 2021-04-23 ENCOUNTER — ANESTHESIA (OUTPATIENT)
Dept: ENDOSCOPY | Facility: HOSPITAL | Age: 61
End: 2021-04-23
Payer: MEDICAID

## 2021-04-23 VITALS
HEIGHT: 69 IN | OXYGEN SATURATION: 97 % | HEART RATE: 58 BPM | DIASTOLIC BLOOD PRESSURE: 78 MMHG | SYSTOLIC BLOOD PRESSURE: 118 MMHG | RESPIRATION RATE: 20 BRPM | WEIGHT: 176 LBS | BODY MASS INDEX: 26.07 KG/M2 | TEMPERATURE: 98 F

## 2021-04-23 DIAGNOSIS — Z12.11 ENCOUNTER FOR SCREENING COLONOSCOPY: ICD-10-CM

## 2021-04-23 PROCEDURE — 00812 ANES LWR INTST SCR COLSC: CPT | Performed by: SURGERY

## 2021-04-23 PROCEDURE — E9220 PRA ENDO ANESTHESIA: ICD-10-PCS | Mod: ,,, | Performed by: NURSE ANESTHETIST, CERTIFIED REGISTERED

## 2021-04-23 PROCEDURE — E9220 PRA ENDO ANESTHESIA: HCPCS | Mod: ,,, | Performed by: NURSE ANESTHETIST, CERTIFIED REGISTERED

## 2021-04-23 PROCEDURE — 45378 PR COLONOSCOPY,DIAGNOSTIC: ICD-10-PCS | Mod: ,,, | Performed by: SURGERY

## 2021-04-23 PROCEDURE — 37000009 HC ANESTHESIA EA ADD 15 MINS: Performed by: SURGERY

## 2021-04-23 PROCEDURE — 25000003 PHARM REV CODE 250: Performed by: SURGERY

## 2021-04-23 PROCEDURE — G0121 COLON CA SCRN NOT HI RSK IND: HCPCS | Performed by: SURGERY

## 2021-04-23 PROCEDURE — 45378 DIAGNOSTIC COLONOSCOPY: CPT | Mod: ,,, | Performed by: SURGERY

## 2021-04-23 PROCEDURE — 25000003 PHARM REV CODE 250: Performed by: NURSE ANESTHETIST, CERTIFIED REGISTERED

## 2021-04-23 PROCEDURE — 63600175 PHARM REV CODE 636 W HCPCS: Performed by: NURSE ANESTHETIST, CERTIFIED REGISTERED

## 2021-04-23 PROCEDURE — 37000008 HC ANESTHESIA 1ST 15 MINUTES: Performed by: SURGERY

## 2021-04-23 RX ORDER — PROPOFOL 10 MG/ML
VIAL (ML) INTRAVENOUS CONTINUOUS PRN
Status: DISCONTINUED | OUTPATIENT
Start: 2021-04-23 | End: 2021-04-23

## 2021-04-23 RX ORDER — SODIUM CHLORIDE 9 MG/ML
INJECTION, SOLUTION INTRAVENOUS CONTINUOUS
Status: DISCONTINUED | OUTPATIENT
Start: 2021-04-23 | End: 2021-04-23 | Stop reason: HOSPADM

## 2021-04-23 RX ORDER — LIDOCAINE HCL/PF 100 MG/5ML
SYRINGE (ML) INTRAVENOUS
Status: DISCONTINUED | OUTPATIENT
Start: 2021-04-23 | End: 2021-04-23

## 2021-04-23 RX ORDER — PROPOFOL 10 MG/ML
VIAL (ML) INTRAVENOUS
Status: DISCONTINUED | OUTPATIENT
Start: 2021-04-23 | End: 2021-04-23

## 2021-04-23 RX ADMIN — PROPOFOL 200 MCG/KG/MIN: 10 INJECTION, EMULSION INTRAVENOUS at 10:04

## 2021-04-23 RX ADMIN — GLYCOPYRROLATE 0.2 MG: 0.2 INJECTION, SOLUTION INTRAMUSCULAR; INTRAVITREAL at 10:04

## 2021-04-23 RX ADMIN — Medication 100 MG: at 10:04

## 2021-04-23 RX ADMIN — SODIUM CHLORIDE: 0.9 INJECTION, SOLUTION INTRAVENOUS at 10:04

## 2021-04-23 RX ADMIN — PROPOFOL 100 MG: 10 INJECTION, EMULSION INTRAVENOUS at 10:04

## 2021-05-03 ENCOUNTER — TELEPHONE (OUTPATIENT)
Dept: SURGERY | Facility: CLINIC | Age: 61
End: 2021-05-03

## 2021-05-03 ENCOUNTER — TELEPHONE (OUTPATIENT)
Dept: INTERNAL MEDICINE | Facility: CLINIC | Age: 61
End: 2021-05-03

## 2021-05-03 RX ORDER — FLUTICASONE PROPIONATE 50 MCG
1 SPRAY, SUSPENSION (ML) NASAL 2 TIMES DAILY
Qty: 16 G | Refills: 1 | Status: SHIPPED | OUTPATIENT
Start: 2021-05-03 | End: 2023-03-03

## 2021-05-04 ENCOUNTER — TELEPHONE (OUTPATIENT)
Dept: INTERNAL MEDICINE | Facility: CLINIC | Age: 61
End: 2021-05-04

## 2021-05-04 ENCOUNTER — TELEPHONE (OUTPATIENT)
Dept: SURGERY | Facility: CLINIC | Age: 61
End: 2021-05-04

## 2021-05-05 ENCOUNTER — TELEPHONE (OUTPATIENT)
Dept: INTERNAL MEDICINE | Facility: CLINIC | Age: 61
End: 2021-05-05

## 2021-05-10 RX ORDER — METHOCARBAMOL 500 MG/1
TABLET, FILM COATED ORAL
Qty: 90 TABLET | Refills: 0 | Status: SHIPPED | OUTPATIENT
Start: 2021-05-10 | End: 2023-03-03 | Stop reason: SDUPTHER

## 2021-05-25 ENCOUNTER — TELEPHONE (OUTPATIENT)
Dept: UROLOGY | Facility: CLINIC | Age: 61
End: 2021-05-25

## 2021-06-04 ENCOUNTER — CLINICAL SUPPORT (OUTPATIENT)
Dept: UROLOGY | Facility: CLINIC | Age: 61
End: 2021-06-04
Payer: MEDICAID

## 2021-06-04 DIAGNOSIS — R79.89 LOW TESTOSTERONE: Primary | ICD-10-CM

## 2021-06-04 PROCEDURE — 96372 THER/PROPH/DIAG INJ SC/IM: CPT | Mod: PBBFAC,PN

## 2021-06-04 RX ORDER — TESTOSTERONE CYPIONATE 200 MG/ML
100 INJECTION, SOLUTION INTRAMUSCULAR
Status: SHIPPED | OUTPATIENT
Start: 2021-06-04 | End: 2021-11-19

## 2021-06-04 RX ADMIN — TESTOSTERONE CYPIONATE 100 MG: 200 INJECTION, SOLUTION INTRAMUSCULAR at 03:06

## 2021-06-18 ENCOUNTER — CLINICAL SUPPORT (OUTPATIENT)
Dept: UROLOGY | Facility: CLINIC | Age: 61
End: 2021-06-18
Payer: MEDICAID

## 2021-06-18 PROCEDURE — 96372 THER/PROPH/DIAG INJ SC/IM: CPT | Mod: PBBFAC,PN

## 2021-06-18 RX ADMIN — TESTOSTERONE CYPIONATE 100 MG: 200 INJECTION, SOLUTION INTRAMUSCULAR at 02:06

## 2022-12-28 ENCOUNTER — TELEPHONE (OUTPATIENT)
Dept: INTERNAL MEDICINE | Facility: CLINIC | Age: 62
End: 2022-12-28
Payer: MEDICAID

## 2022-12-28 NOTE — TELEPHONE ENCOUNTER
----- Message from Maine Vazquez sent at 12/28/2022 11:05 AM CST -----  Contact: [phone]  No blue slot available to schedule an appointment for the patient.  Patient is established with which PCP:   Reason for the visit: sooner the May annual   Would the patient like a call back, or a response through their [facility] portal?:  call back

## 2023-03-03 ENCOUNTER — HOSPITAL ENCOUNTER (OUTPATIENT)
Dept: RADIOLOGY | Facility: HOSPITAL | Age: 63
Discharge: HOME | End: 2023-03-03
Attending: INTERNAL MEDICINE
Payer: COMMERCIAL

## 2023-03-03 ENCOUNTER — OFFICE VISIT (OUTPATIENT)
Dept: INTERNAL MEDICINE | Facility: CLINIC | Age: 63
End: 2023-03-03
Payer: COMMERCIAL

## 2023-03-03 ENCOUNTER — TELEPHONE (OUTPATIENT)
Dept: OTOLARYNGOLOGY | Facility: CLINIC | Age: 63
End: 2023-03-03
Payer: COMMERCIAL

## 2023-03-03 VITALS
HEIGHT: 69 IN | HEART RATE: 68 BPM | BODY MASS INDEX: 27.6 KG/M2 | WEIGHT: 186.31 LBS | OXYGEN SATURATION: 95 % | SYSTOLIC BLOOD PRESSURE: 100 MMHG | DIASTOLIC BLOOD PRESSURE: 74 MMHG

## 2023-03-03 DIAGNOSIS — Z00.00 ROUTINE GENERAL MEDICAL EXAMINATION AT A HEALTH CARE FACILITY: Primary | ICD-10-CM

## 2023-03-03 DIAGNOSIS — M79.672 LEFT FOOT PAIN: ICD-10-CM

## 2023-03-03 DIAGNOSIS — H91.90 HEARING LOSS, UNSPECIFIED HEARING LOSS TYPE, UNSPECIFIED LATERALITY: ICD-10-CM

## 2023-03-03 DIAGNOSIS — E55.9 VITAMIN D DEFICIENCY DISEASE: ICD-10-CM

## 2023-03-03 DIAGNOSIS — M54.50 INTERMITTENT LOW BACK PAIN: ICD-10-CM

## 2023-03-03 PROCEDURE — 73630 X-RAY EXAM OF FOOT: CPT | Mod: TC,LT

## 2023-03-03 PROCEDURE — 1159F MED LIST DOCD IN RCRD: CPT | Mod: CPTII,S$GLB,, | Performed by: INTERNAL MEDICINE

## 2023-03-03 PROCEDURE — 1159F PR MEDICATION LIST DOCUMENTED IN MEDICAL RECORD: ICD-10-PCS | Mod: CPTII,S$GLB,, | Performed by: INTERNAL MEDICINE

## 2023-03-03 PROCEDURE — 3078F PR MOST RECENT DIASTOLIC BLOOD PRESSURE < 80 MM HG: ICD-10-PCS | Mod: CPTII,S$GLB,, | Performed by: INTERNAL MEDICINE

## 2023-03-03 PROCEDURE — 73630 XR FOOT COMPLETE 3 VIEW LEFT: ICD-10-PCS | Mod: 26,LT,, | Performed by: RADIOLOGY

## 2023-03-03 PROCEDURE — 99396 PREV VISIT EST AGE 40-64: CPT | Mod: S$GLB,,, | Performed by: INTERNAL MEDICINE

## 2023-03-03 PROCEDURE — 3008F PR BODY MASS INDEX (BMI) DOCUMENTED: ICD-10-PCS | Mod: CPTII,S$GLB,, | Performed by: INTERNAL MEDICINE

## 2023-03-03 PROCEDURE — 72100 X-RAY EXAM L-S SPINE 2/3 VWS: CPT | Mod: 26,,, | Performed by: RADIOLOGY

## 2023-03-03 PROCEDURE — 99999 PR PBB SHADOW E&M-EST. PATIENT-LVL V: ICD-10-PCS | Mod: PBBFAC,,, | Performed by: INTERNAL MEDICINE

## 2023-03-03 PROCEDURE — 3074F SYST BP LT 130 MM HG: CPT | Mod: CPTII,S$GLB,, | Performed by: INTERNAL MEDICINE

## 2023-03-03 PROCEDURE — 99999 PR PBB SHADOW E&M-EST. PATIENT-LVL V: CPT | Mod: PBBFAC,,, | Performed by: INTERNAL MEDICINE

## 2023-03-03 PROCEDURE — 3078F DIAST BP <80 MM HG: CPT | Mod: CPTII,S$GLB,, | Performed by: INTERNAL MEDICINE

## 2023-03-03 PROCEDURE — 99396 PR PREVENTIVE VISIT,EST,40-64: ICD-10-PCS | Mod: S$GLB,,, | Performed by: INTERNAL MEDICINE

## 2023-03-03 PROCEDURE — 73630 X-RAY EXAM OF FOOT: CPT | Mod: 26,LT,, | Performed by: RADIOLOGY

## 2023-03-03 PROCEDURE — 3074F PR MOST RECENT SYSTOLIC BLOOD PRESSURE < 130 MM HG: ICD-10-PCS | Mod: CPTII,S$GLB,, | Performed by: INTERNAL MEDICINE

## 2023-03-03 PROCEDURE — 72100 X-RAY EXAM L-S SPINE 2/3 VWS: CPT | Mod: TC

## 2023-03-03 PROCEDURE — 3008F BODY MASS INDEX DOCD: CPT | Mod: CPTII,S$GLB,, | Performed by: INTERNAL MEDICINE

## 2023-03-03 PROCEDURE — 72100 XR LUMBAR SPINE AP AND LATERAL: ICD-10-PCS | Mod: 26,,, | Performed by: RADIOLOGY

## 2023-03-03 RX ORDER — METHOCARBAMOL 500 MG/1
500 TABLET, FILM COATED ORAL 3 TIMES DAILY PRN
Qty: 90 TABLET | Refills: 0 | Status: SHIPPED | OUTPATIENT
Start: 2023-03-03 | End: 2024-02-05 | Stop reason: CLARIF

## 2023-03-03 RX ORDER — MELOXICAM 15 MG/1
15 TABLET ORAL DAILY
Qty: 30 TABLET | Refills: 1 | Status: SHIPPED | OUTPATIENT
Start: 2023-03-03 | End: 2024-02-05 | Stop reason: CLARIF

## 2023-03-03 NOTE — PROGRESS NOTES
CHIEF COMPLAINT: Annual exam     HISTORY OF PRESENT ILLNESS: This is a 62 year old who presents for his annual physical.     I last saw him 21    He has an aching pain in the great toe of the left foot for years. He is now having more left foot pain.  He climbs ladders and steps daily for work.  I xrayed the left foot 2011  He presented to the ED on 17 due to right - he has a bunion and arthritis of the left foot    He has low back pain and wants to see someone for his back.      He was diagnosed with a right sided pulmonary embolus. He stayed overnight in the hospital due to low oxygen levels. He was discharged the next day on Eliquis 5 mg twice daily. He took one prescription of the Eliquis.  The right chest pain resolved and never came back. He does not have chest pain, shortness of breath.  He has been feeling well. He has not been taking aspirin in over a year. 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  He was working [facility] the [employer] in [facility].      His sister has cutaneous lupus. Mother had a blood clot at age 85 after a long airplane flight. Brother age 68 just had a couple strokes and is now in a wheelchair.  He was drinker.         He drinks very little alcohol.  Never smoked.      Vision is getting worse. Hearing is getting worse.      He is no longer taking testosterone injections- does not want to take them      REVIEW OF SYSTEMS: No fevers, chills, night sweats, fatigue, visual change, hearing loss, sinus congestion, sore throat, chest pain, shortness of breath, nausea, vomiting, constipation, diarrhea, dysuria, hematuria, polydipsia, polyuria, joint pain, muscle pain, headaches, anxiety, depression, insomnia.       His friend  recently - he is down over this issues otherwise ok    PAST MEDICAL HISTORY:  1. Acute stroke in the left frontoparietal region, 2007. Symptoms resolved upon discharge from the hospital. MRI was consistent with acute stroke.  "  2. Suggested carrier for factor V Leiden mutation in June 2007 at the time of his stroke. HE had a low activated protein C level at 1.8 with negative gene mutation.  3. Lymph node biopsy in 1989 that was benign.  4. History of leukopenia. He reports he had a workup at [facility] that was negative.  5. Pulmonary embolus Jan 2017.      MEDS AND ALLERGIES: Updated on EPIC   He does not smoke. . He is  with 3 children. He works in a refinery as a .       PHYSICAL EXAM:    /74 (BP Location: Right arm, Patient Position: Sitting)   Pulse 68   Ht 5' 9" (1.753 m)   Wt 84.5 kg (186 lb 4.6 oz)   SpO2 95%   BMI 27.51 kg/m²     GENERAL: Alert, oriented. No apparent distress. Affect within normal  limits. Conjunctivae anicteric. Tympanic membranes clear. Oropharynx  clear.  NECK: Supple. Respiratory effort normal. Lungs clear to auscultation.  HEART: Regular rate and rhythm without murmurs, gallops, or rubs. No lower  extremity edema.  ABDOMEN: Soft, nondistended, nontender. Bowel sounds present. No  hepatosplenomegaly.        ASSESSMENT AND PLAN:  1. Annual exam  2. History of stroke and Pulmonary embolus- repeat factor V leiden and activated protein C.  - aspirin 81 mg daily  3. Foot pain - xray and to podiatry  4. Low back pain - xray lumbar spine.  Methocarbamol 500 mg three times daily as needed. To the back and spine center.   Colonoscopy normal 4/2021 due 3 years due to incomplete prep - 4/2024  I will see him back in a year. I will check a CBC, CMP, TSH, PSA, lipids and vitamin D level.  ENT for hearing test  "

## 2023-03-05 ENCOUNTER — TELEPHONE (OUTPATIENT)
Dept: INTERNAL MEDICINE | Facility: CLINIC | Age: 63
End: 2023-03-05
Payer: COMMERCIAL

## 2023-03-05 NOTE — TELEPHONE ENCOUNTER
Nurse James - please notify pt    Your blood count, blood sugar, kidney function, liver function, cholesterol, thyroid function are fine      Your vitamin D level is low  Take over the counter vitamin D 1000 units daily.    Your PSA (prostate blood test) is elevated. You need to see urology for further evaluation of your prostate      Xray of your lower back reveals that you have some mild arthritis    Xray of your foot reveals that the artritis in the foot has gotten worse.     He needs to see   Urology - Dr Torres  [facility]  ENT for hearing eval  Back and spine center - DR Goetz.     Please have schedulers assist in scheduling.

## 2023-03-06 ENCOUNTER — TELEPHONE (OUTPATIENT)
Dept: SPINE | Facility: CLINIC | Age: 63
End: 2023-03-06
Payer: COMMERCIAL

## 2023-03-06 ENCOUNTER — TELEPHONE (OUTPATIENT)
Dept: PODIATRY | Facility: CLINIC | Age: 63
End: 2023-03-06
Payer: COMMERCIAL

## 2023-03-06 NOTE — TELEPHONE ENCOUNTER
----- Message from Tran Erickson sent at 3/6/2023  8:21 AM CST -----  Regarding: Scheduling  Please assist with scheduling appointment

## 2023-03-06 NOTE — TELEPHONE ENCOUNTER
----- Message from Misty Maldonado sent at 3/6/2023  4:35 PM CST -----  Contact: Joss Guillen LPN  Patient  Patient is returning a phone call.  Who left a message for the patient: Joss Guillen LPN   Does patient know what this is regarding:  not sure  Would you like a call back, or a response through your [facility] portal?:   Call Back  Comments:

## 2023-03-06 NOTE — TELEPHONE ENCOUNTER
Appointment schedule.      ----- Message from Tran Erickson sent at 3/6/2023  8:31 AM CST -----  Regarding: Scheduling  Please assist with scheduling appointment     Dr. Ramirez placed a referral and recommended this provider

## 2023-03-08 NOTE — TELEPHONE ENCOUNTER
Pt advised, he would like apt booked on 3-17 or 3-31 at [facility] please. Please mail appt slips as pt is off every other Friday.

## 2023-03-20 NOTE — PROGRESS NOTES
DATE: 3/31/2023  PATIENT: Suraj Constantino    Supervising Physician: Chaim Queen M.D.    CHIEF COMPLAINT: low back pain    HISTORY:  Suraj Constantino is a 63 y.o. male here for initial evaluation of intermittent low back pain (Back - 5). The pain has been present for years. The patient describes the pain as dull in his low back with occasional cramping in his legs with exertion.  The pain is worse with laying flat and improved by stretch. There is no associated numbness and tingling. There is no subjective weakness. Prior treatments have included advil, mobic, and robaxin, but no PT, HIGINIO or surgery.  The patient has failed the AAOS spine conditioning program. Exercises include head rolls, kneeling back extension, sitting rotation stretch, modified seated side straddle, knee to chest, bird dog, plank, modified seated plank, hip bridges, abdominal bracing, and abdominal crunch. Pt completed each exercise 5 times daily for 6-8 weeks.    The patient denies myelopathic symptoms such as handwriting changes or difficulty with buttons/coins/keys. Denies perineal paresthesias, bowel/bladder dysfunction.    PAST MEDICAL/SURGICAL HISTORY:  Past Medical History:   Diagnosis Date    Pulmonary embolism     Stroke      Past Surgical History:   Procedure Laterality Date    COLONOSCOPY N/A 4/23/2021    Procedure: COLONOSCOPY;  Surgeon: Ranjith Garcia MD;  Location: [address]);  Service: Endoscopy;  Laterality: N/A;  positive covid 2/12/21-[facility]    SURGICAL REMOVAL OF LYMPH NODE  1989    right arm       Medications:   Current Outpatient Medications on File Prior to Visit   Medication Sig Dispense Refill    aspirin (ECOTRIN) 81 MG EC tablet Take 81 mg by mouth once daily.      meloxicam (MOBIC) 15 MG tablet Take 1 tablet (15 mg total) by mouth once daily. 30 tablet 1    methocarbamoL (ROBAXIN) 500 MG Tab Take 1 tablet (500 mg total) by mouth 3 (three) times daily as needed (muscle spasm). 90 tablet 0     No current  "facility-administered medications on file prior to visit.       Social History:   Social History     Socioeconomic History    Marital status:    Occupational History     Employer: [employer]   Tobacco Use    Smoking status: Never   Substance and Sexual Activity    Alcohol use: Yes     Alcohol/week: 1.0 standard drink     Types: 1 Cans of beer per week    Drug use: Never   Social History Narrative    , lives alone, 2 kids.       REVIEW OF SYSTEMS:  Constitution: Negative. Negative for chills, fever and night sweats.   Cardiovascular: Negative for chest pain and syncope.   Respiratory: Negative for cough and shortness of breath.   Gastrointestinal: See HPI. Negative for nausea/vomiting. Negative for abdominal pain.  Genitourinary: See HPI. Negative for discoloration or dysuria.  Skin: Negative for dry skin, itching and rash.   Hematologic/Lymphatic: Negative for bleeding problem. Does not bruise/bleed easily.   Musculoskeletal: Negative for falls and muscle weakness.   Neurological: See HPI. No seizures.   Endocrine: Negative for polydipsia, polyphagia and polyuria.   Allergic/Immunologic: Negative for hives and persistent infections.     EXAM:  Ht 5' 9" (1.753 m)   Wt 83.6 kg (184 lb 6.3 oz)   BMI 27.23 kg/m²     General: The patient is a very pleasant 63 y.o. male in no apparent distress, the patient is oriented to person, place and time.  Psych: Normal mood and affect  HEENT: Vision grossly intact, hearing intact to the spoken word.  Lungs: Respirations unlabored.  Gait: Normal station and gait, no difficulty with toe or heel walk.   Skin: Dorsal lumbar skin negative for rashes, lesions, hairy patches and surgical scars. There is mild lumbar tenderness to palpation.  Range of motion: Lumbar range of motion is acceptable.  Spinal Balance: Global saggital and coronal spinal balance acceptable, not significant for scoliosis and kyphosis.  Musculoskeletal: No pain with the range of motion of the " bilateral hips. No trochanteric tenderness to palpation.  Vascular: Bilateral lower extremities warm and well perfused, dorsalis pedis pulses 2+ bilaterally.  Neurological: decreased strength and tone in all major motor groups in the bilateral lower extremities. Normal sensation to light touch in the L2-S1 dermatomes bilaterally.  Deep tendon reflexes symmetric 2+ in the bilateral lower extremities.  Negative Babinski bilaterally. Straight leg raise positive bilaterally.    IMAGING:      Today I personally reviewed AP, Lat and Flex/Ex  upright L-spine films that demonstrate mild DDD at L4/5.       Body mass index is 27.23 kg/m².    Hemoglobin A1C   Date Value Ref Range Status   06/13/2007 5.7 4.5 - 6.2 % Final           ASSESSMENT/PLAN:    Suraj was seen today for low-back pain.    Diagnoses and all orders for this visit:    Dorsalgia, unspecified  -     MRI Lumbar Spine Without Contrast; Future    Intermittent low back pain  -     Ambulatory referral/consult to [facility]      Today we discussed at length all of the different treatment options including anti-inflammatories, acetaminophen, rest, ice, heat, physical therapy including strengthening and stretching exercises, home exercises, ROM, aerobic conditioning, aqua therapy, other modalities including ultrasound, massage, and dry needling, epidural steroid injections and finally surgical intervention.  the patient would like to treat conservatively. He would also like to hold off on PT for now. Lumbar MRI ordered, he will follow up in the clinic with pain management. He may follow up with me as needed.

## 2023-03-31 ENCOUNTER — OFFICE VISIT (OUTPATIENT)
Dept: ORTHOPEDICS | Facility: CLINIC | Age: 63
End: 2023-03-31
Payer: COMMERCIAL

## 2023-03-31 ENCOUNTER — OFFICE VISIT (OUTPATIENT)
Dept: OPTOMETRY | Facility: CLINIC | Age: 63
End: 2023-03-31
Payer: COMMERCIAL

## 2023-03-31 VITALS — BODY MASS INDEX: 27.31 KG/M2 | HEIGHT: 69 IN | WEIGHT: 184.38 LBS

## 2023-03-31 DIAGNOSIS — M54.50 INTERMITTENT LOW BACK PAIN: ICD-10-CM

## 2023-03-31 DIAGNOSIS — H25.13 SENILE NUCLEAR SCLEROSIS, BILATERAL: Primary | ICD-10-CM

## 2023-03-31 DIAGNOSIS — H52.4 MYOPIA WITH ASTIGMATISM AND PRESBYOPIA, BILATERAL: ICD-10-CM

## 2023-03-31 DIAGNOSIS — H52.13 MYOPIA WITH ASTIGMATISM AND PRESBYOPIA, BILATERAL: ICD-10-CM

## 2023-03-31 DIAGNOSIS — H52.203 MYOPIA WITH ASTIGMATISM AND PRESBYOPIA, BILATERAL: ICD-10-CM

## 2023-03-31 DIAGNOSIS — M54.9 DORSALGIA, UNSPECIFIED: Primary | ICD-10-CM

## 2023-03-31 PROCEDURE — 1159F MED LIST DOCD IN RCRD: CPT | Mod: CPTII,S$GLB,, | Performed by: REGISTERED NURSE

## 2023-03-31 PROCEDURE — 99999 PR PBB SHADOW E&M-EST. PATIENT-LVL III: ICD-10-PCS | Mod: PBBFAC,,, | Performed by: OPTOMETRIST

## 2023-03-31 PROCEDURE — 92015 PR REFRACTION: ICD-10-PCS | Mod: S$GLB,,, | Performed by: OPTOMETRIST

## 2023-03-31 PROCEDURE — 99999 PR PBB SHADOW E&M-EST. PATIENT-LVL III: CPT | Mod: PBBFAC,,, | Performed by: OPTOMETRIST

## 2023-03-31 PROCEDURE — 92014 COMPRE OPH EXAM EST PT 1/>: CPT | Mod: S$GLB,,, | Performed by: OPTOMETRIST

## 2023-03-31 PROCEDURE — 3008F BODY MASS INDEX DOCD: CPT | Mod: CPTII,S$GLB,, | Performed by: REGISTERED NURSE

## 2023-03-31 PROCEDURE — 1159F PR MEDICATION LIST DOCUMENTED IN MEDICAL RECORD: ICD-10-PCS | Mod: CPTII,S$GLB,, | Performed by: REGISTERED NURSE

## 2023-03-31 PROCEDURE — 92015 DETERMINE REFRACTIVE STATE: CPT | Mod: S$GLB,,, | Performed by: OPTOMETRIST

## 2023-03-31 PROCEDURE — 99999 PR PBB SHADOW E&M-EST. PATIENT-LVL III: CPT | Mod: PBBFAC,,, | Performed by: REGISTERED NURSE

## 2023-03-31 PROCEDURE — 1159F MED LIST DOCD IN RCRD: CPT | Mod: CPTII,S$GLB,, | Performed by: OPTOMETRIST

## 2023-03-31 PROCEDURE — 99204 PR OFFICE/OUTPT VISIT, NEW, LEVL IV, 45-59 MIN: ICD-10-PCS | Mod: S$GLB,,, | Performed by: REGISTERED NURSE

## 2023-03-31 PROCEDURE — 99204 OFFICE O/P NEW MOD 45 MIN: CPT | Mod: S$GLB,,, | Performed by: REGISTERED NURSE

## 2023-03-31 PROCEDURE — 99999 PR PBB SHADOW E&M-EST. PATIENT-LVL III: ICD-10-PCS | Mod: PBBFAC,,, | Performed by: REGISTERED NURSE

## 2023-03-31 PROCEDURE — 92014 PR EYE EXAM, EST PATIENT,COMPREHESV: ICD-10-PCS | Mod: S$GLB,,, | Performed by: OPTOMETRIST

## 2023-03-31 PROCEDURE — 3008F PR BODY MASS INDEX (BMI) DOCUMENTED: ICD-10-PCS | Mod: CPTII,S$GLB,, | Performed by: REGISTERED NURSE

## 2023-03-31 PROCEDURE — 1160F PR REVIEW ALL MEDS BY PRESCRIBER/CLIN PHARMACIST DOCUMENTED: ICD-10-PCS | Mod: CPTII,S$GLB,, | Performed by: OPTOMETRIST

## 2023-03-31 PROCEDURE — 1160F RVW MEDS BY RX/DR IN RCRD: CPT | Mod: CPTII,S$GLB,, | Performed by: OPTOMETRIST

## 2023-03-31 PROCEDURE — 1159F PR MEDICATION LIST DOCUMENTED IN MEDICAL RECORD: ICD-10-PCS | Mod: CPTII,S$GLB,, | Performed by: OPTOMETRIST

## 2023-03-31 RX ORDER — CETIRIZINE HYDROCHLORIDE 10 MG/1
10 TABLET ORAL
COMMUNITY
Start: 2023-01-11 | End: 2024-02-05 | Stop reason: CLARIF

## 2023-03-31 RX ORDER — PROMETHAZINE HYDROCHLORIDE AND DEXTROMETHORPHAN HYDROBROMIDE 6.25; 15 MG/5ML; MG/5ML
5 SYRUP ORAL 2 TIMES DAILY PRN
COMMUNITY
Start: 2023-01-11 | End: 2024-02-05 | Stop reason: CLARIF

## 2023-03-31 NOTE — PROGRESS NOTES
YONIS    ALMITA: 02/21 with Dr. acosta  Chief complaint (CC): patient is here for annual eye exam.  Patient has   noticed that near is not as clear with his distance only glasses. Distance   vision seems fine.  Glasses? + 2 yrs. old  Contacts? -  H/o eye surgery, injections or laser: -  H/o eye injury: -  Known eye conditions? See above  Family h/o eye conditions? MGF with glaucoma  Eye gtts? -      (-) Flashes (-)  Floaters (-) Mucous   (-)  Tearing (-) Itching (-) Burning   (-) Headaches (-) Eye Pain/discomfort (-) Irritation   (-)  Redness (-) Double vision (-) Blurry vision    Diabetic? -  A1c? -      Last edited by Lisa Phelps on 3/31/2023  2:18 PM.            Assessment /Plan     For exam results, see Encounter Report.      Senile nuclear sclerosis, bilateral  Nuclear sclerotic cataract - not visually significant. Observe.    Myopia with astigmatism and presbyopia, bilateral  SRx released to patient. Patient educated on lens options. Normal ocular health. RTC 1 year for routine exam.       Pt is a  and lives past [place] in [facility]. His mother owns [employer]

## 2023-04-08 ENCOUNTER — HOSPITAL ENCOUNTER (OUTPATIENT)
Dept: RADIOLOGY | Facility: OTHER | Age: 63
Discharge: HOME | End: 2023-04-08
Attending: REGISTERED NURSE
Payer: COMMERCIAL

## 2023-04-08 DIAGNOSIS — M54.9 DORSALGIA, UNSPECIFIED: ICD-10-CM

## 2023-04-08 PROCEDURE — 72148 MRI LUMBAR SPINE WITHOUT CONTRAST: ICD-10-PCS | Mod: 26,,, | Performed by: RADIOLOGY

## 2023-04-08 PROCEDURE — 72148 MRI LUMBAR SPINE W/O DYE: CPT | Mod: 26,,, | Performed by: RADIOLOGY

## 2023-04-08 PROCEDURE — 72148 MRI LUMBAR SPINE W/O DYE: CPT | Mod: TC

## 2023-04-14 ENCOUNTER — OFFICE VISIT (OUTPATIENT)
Dept: PAIN MEDICINE | Facility: CLINIC | Age: 63
End: 2023-04-14
Attending: ANESTHESIOLOGY
Payer: COMMERCIAL

## 2023-04-14 ENCOUNTER — OFFICE VISIT (OUTPATIENT)
Dept: PODIATRY | Facility: CLINIC | Age: 63
End: 2023-04-14
Payer: COMMERCIAL

## 2023-04-14 VITALS
BODY MASS INDEX: 27.03 KG/M2 | DIASTOLIC BLOOD PRESSURE: 72 MMHG | SYSTOLIC BLOOD PRESSURE: 115 MMHG | HEART RATE: 56 BPM | WEIGHT: 182.5 LBS | HEIGHT: 69 IN

## 2023-04-14 VITALS
OXYGEN SATURATION: 95 % | WEIGHT: 184.88 LBS | SYSTOLIC BLOOD PRESSURE: 130 MMHG | HEART RATE: 55 BPM | DIASTOLIC BLOOD PRESSURE: 84 MMHG | BODY MASS INDEX: 27.3 KG/M2 | RESPIRATION RATE: 16 BRPM

## 2023-04-14 DIAGNOSIS — M79.672 LEFT FOOT PAIN: ICD-10-CM

## 2023-04-14 DIAGNOSIS — M47.816 LUMBAR SPONDYLOSIS: Primary | ICD-10-CM

## 2023-04-14 DIAGNOSIS — M20.5X2 HALLUX LIMITUS, ACQUIRED, LEFT: Primary | ICD-10-CM

## 2023-04-14 DIAGNOSIS — M51.36 DDD (DEGENERATIVE DISC DISEASE), LUMBAR: ICD-10-CM

## 2023-04-14 PROBLEM — M51.369 DDD (DEGENERATIVE DISC DISEASE), LUMBAR: Status: ACTIVE | Noted: 2023-04-14

## 2023-04-14 PROCEDURE — 99204 OFFICE O/P NEW MOD 45 MIN: CPT | Mod: S$GLB,,, | Performed by: PAIN MEDICINE

## 2023-04-14 PROCEDURE — 1160F RVW MEDS BY RX/DR IN RCRD: CPT | Mod: CPTII,S$GLB,, | Performed by: PAIN MEDICINE

## 2023-04-14 PROCEDURE — 3079F DIAST BP 80-89 MM HG: CPT | Mod: CPTII,S$GLB,, | Performed by: PAIN MEDICINE

## 2023-04-14 PROCEDURE — 3008F PR BODY MASS INDEX (BMI) DOCUMENTED: ICD-10-PCS | Mod: CPTII,S$GLB,, | Performed by: PODIATRIST

## 2023-04-14 PROCEDURE — 3074F PR MOST RECENT SYSTOLIC BLOOD PRESSURE < 130 MM HG: ICD-10-PCS | Mod: CPTII,S$GLB,, | Performed by: PODIATRIST

## 2023-04-14 PROCEDURE — 3075F PR MOST RECENT SYSTOLIC BLOOD PRESS GE 130-139MM HG: ICD-10-PCS | Mod: CPTII,S$GLB,, | Performed by: PAIN MEDICINE

## 2023-04-14 PROCEDURE — 99203 PR OFFICE/OUTPT VISIT, NEW, LEVL III, 30-44 MIN: ICD-10-PCS | Mod: S$GLB,,, | Performed by: PODIATRIST

## 2023-04-14 PROCEDURE — 3008F BODY MASS INDEX DOCD: CPT | Mod: CPTII,S$GLB,, | Performed by: PODIATRIST

## 2023-04-14 PROCEDURE — 99203 OFFICE O/P NEW LOW 30 MIN: CPT | Mod: S$GLB,,, | Performed by: PODIATRIST

## 2023-04-14 PROCEDURE — 3008F PR BODY MASS INDEX (BMI) DOCUMENTED: ICD-10-PCS | Mod: CPTII,S$GLB,, | Performed by: PAIN MEDICINE

## 2023-04-14 PROCEDURE — 3078F DIAST BP <80 MM HG: CPT | Mod: CPTII,S$GLB,, | Performed by: PODIATRIST

## 2023-04-14 PROCEDURE — 1160F PR REVIEW ALL MEDS BY PRESCRIBER/CLIN PHARMACIST DOCUMENTED: ICD-10-PCS | Mod: CPTII,S$GLB,, | Performed by: PAIN MEDICINE

## 2023-04-14 PROCEDURE — 3008F BODY MASS INDEX DOCD: CPT | Mod: CPTII,S$GLB,, | Performed by: PAIN MEDICINE

## 2023-04-14 PROCEDURE — 99204 PR OFFICE/OUTPT VISIT, NEW, LEVL IV, 45-59 MIN: ICD-10-PCS | Mod: S$GLB,,, | Performed by: PAIN MEDICINE

## 2023-04-14 PROCEDURE — 3074F SYST BP LT 130 MM HG: CPT | Mod: CPTII,S$GLB,, | Performed by: PODIATRIST

## 2023-04-14 PROCEDURE — 99999 PR PBB SHADOW E&M-EST. PATIENT-LVL IV: ICD-10-PCS | Mod: PBBFAC,,, | Performed by: PAIN MEDICINE

## 2023-04-14 PROCEDURE — 1159F PR MEDICATION LIST DOCUMENTED IN MEDICAL RECORD: ICD-10-PCS | Mod: CPTII,S$GLB,, | Performed by: PAIN MEDICINE

## 2023-04-14 PROCEDURE — 3079F PR MOST RECENT DIASTOLIC BLOOD PRESSURE 80-89 MM HG: ICD-10-PCS | Mod: CPTII,S$GLB,, | Performed by: PAIN MEDICINE

## 2023-04-14 PROCEDURE — 99999 PR PBB SHADOW E&M-EST. PATIENT-LVL III: CPT | Mod: PBBFAC,,, | Performed by: PODIATRIST

## 2023-04-14 PROCEDURE — 1159F MED LIST DOCD IN RCRD: CPT | Mod: CPTII,S$GLB,, | Performed by: PAIN MEDICINE

## 2023-04-14 PROCEDURE — 99999 PR PBB SHADOW E&M-EST. PATIENT-LVL IV: CPT | Mod: PBBFAC,,, | Performed by: PAIN MEDICINE

## 2023-04-14 PROCEDURE — 1159F MED LIST DOCD IN RCRD: CPT | Mod: CPTII,S$GLB,, | Performed by: PODIATRIST

## 2023-04-14 PROCEDURE — 3075F SYST BP GE 130 - 139MM HG: CPT | Mod: CPTII,S$GLB,, | Performed by: PAIN MEDICINE

## 2023-04-14 PROCEDURE — 99999 PR PBB SHADOW E&M-EST. PATIENT-LVL III: ICD-10-PCS | Mod: PBBFAC,,, | Performed by: PODIATRIST

## 2023-04-14 PROCEDURE — 1159F PR MEDICATION LIST DOCUMENTED IN MEDICAL RECORD: ICD-10-PCS | Mod: CPTII,S$GLB,, | Performed by: PODIATRIST

## 2023-04-14 PROCEDURE — 3078F PR MOST RECENT DIASTOLIC BLOOD PRESSURE < 80 MM HG: ICD-10-PCS | Mod: CPTII,S$GLB,, | Performed by: PODIATRIST

## 2023-04-14 NOTE — PROGRESS NOTES
PODIATRY NOTE       PATIENT ID:  Suraj Constantino is a 63 y.o. male.       CHIEF CONCERN:   Foot Pain (Left foot; pain for about 8 years; feels like someone is standing on top of foot)           MEDICAL DECISION MAKING:          Problem List Items Addressed This Visit    None  Visit Diagnoses       Hallux limitus, acquired, left    -  Primary    Left foot pain                    I counseled the patient on the patient's conditions, their implications and medical management.    We discussed non-surgical treatment options, including pharmacologic approach,  shoe recommendations or modifications, and custom foot orthotics.    Consider shoes with a larger toe box.   He reports medications, was taking meloxicam, did not help.     Another treatment option may include intra-articular steroid injection however he states that this also does not help.      He is interested in discussing surgical options.  Surgical consultation placed with Dr. Catherine.           HISTORY OF PRESENT ILLNESS:  Suraj Constantino is a 63 y.o. male presents to clinic with concerns of pain in the big toe joint, left  foot.   Foot Pain (Left foot; pain for about 8 years; feels like someone is standing on top of foot)   He has difficulty standing on his toes.  He would like to run and jog without pain.  He has seen outside doctors for same pain, topical medications and cortisone injection did not help.             Patient Active Problem List   Diagnosis    Acute pulmonary embolism    Right-sided chest pain    Decreased peripheral vision of left eye    Encounter for screening colonoscopy    DDD (degenerative disc disease), lumbar    Lumbar spondylosis           Current Outpatient Medications on File Prior to Visit   Medication Sig Dispense Refill    aspirin (ECOTRIN) 81 MG EC tablet Take 81 mg by mouth once daily.      cetirizine (ZYRTEC) 10 MG tablet Take 10 mg by mouth.      meloxicam (MOBIC) 15 MG tablet Take 1 tablet (15 mg total) by mouth once daily. 30  "tablet 1    methocarbamoL (ROBAXIN) 500 MG Tab Take 1 tablet (500 mg total) by mouth 3 (three) times daily as needed (muscle spasm). (Patient not taking: Reported on 4/14/2023) 90 tablet 0    promethazine-dextromethorphan (PROMETHAZINE-DM) 6.25-15 mg/5 mL Syrp Take 5 mLs by mouth 2 (two) times daily as needed.       No current facility-administered medications on file prior to visit.           Review of patient's allergies indicates:   Allergen Reactions    Pcn [penicillins] Hives             Review of Systems -   General ROS: negative for - chills, fever or night sweats  Respiratory ROS: no cough, shortness of breath, or wheezing  Cardiovascular ROS: no chest pain or dyspnea on exertion  Musculoskeletal ROS: positive for - joint pain and joint stiffness  negative for - muscle pain or muscular weakness  Neurological ROS: no TIA or stroke symptoms      EXAM:     Vitals:    04/14/23 0950   BP: 115/72   BP Location: Right arm   Patient Position: Sitting   BP Method: Medium (Automatic)   Pulse: (!) 56   Weight: 82.8 kg (182 lb 8 oz)   Height: 5' 9" (1.753 m)        General:  Alert and Oriented x 3;  No acute distress      Left  Lower extremity exam:    Vascular:   Dorsalis Pedis:  present   Posterior Tibial:  present  Capillary refill time:  3 seconds  Temperature of toes warm to touch  Edema:  Trace       Neurological:     Sharp touch:  normal  Light touch: normal  Tinels Sign:  Absent  Mulders Click:   Absent        Dermatological:   Skin tone, color, turgor is appropriate for age and gender  Open wounds:  absent  Bruising:  absent  Redness:  none       Musculoskeletal:   Dorsal bony prominence noted at the 1st MTPJ left foot   Limited 1st MTPJ range of motion with crepitus, no trackbound joint.    Approximately 20-30 degrees dorsiflexion unloaded/loaded.            3/3/2023 Left foot xrays  Imaging:  FINDINGS:  Three views of the left foot demonstrate no evidence for acute fracture or dislocation.  There is moderate " to severe 1st MTP joint space narrowing, which is progressed when compared with the prior study.  Previously identified radiopaque retained foreign body in the volar soft tissues of the forefoot is again noted.  Joint spaces are otherwise relatively well preserved.  Soft tissues are within normal limits.

## 2023-04-14 NOTE — PROGRESS NOTES
Subjective:     Patient ID: Suraj Constantino is a 63 y.o. male    Chief Complaint: Low-back Pain (Bilateral achy pain)      Referred by: NIEVES Anthony NP      HPI:    Initial Encounter (4/14/23):  Suraj Constantino is a 63 y.o. male who presents today with chronic midline low back pain.  This pain has been present for years.  No specific inciting events or injuries noted.  Pain is located the midline lower lumbar region radiates to the bilateral paraspinal regions.  The pain does not extend into the lower extremities.  He denies any associated numbness,, bowel dysfunction.  Pain is intermittent.  It is worse in the getting out of bed the day.  Typically improves activity.  Of note, he does report having some chronic pain of the left great toe and foot.  This pain has been present for much longer than his back pain.  He does not feel his pain is related to his low back.  He is scheduled to see podiatry later today.  This pain is described in detail below.    Physical Therapy:  No.    Non-pharmacologic Treatment:  Rest helps         TENS?  No    Pain Medications:         Currently taking:  Meloxicam, Robaxin    Has tried in the past:      Has not tried:  Opioids, Tylenol, Muscle relaxants, TCAs, SNRIs, anticonvulsants, topical creams    Blood thinners:  Aspirin 81 mg    Interventional Therapies:  None    Relevant Surgeries:  None    Affecting sleep?  Yes    Affecting daily activities? yes    Depressive symptoms? no          SI/HI? No    Work status: Employed    Pain Scores:    Best:       7/10  Worst:     9/10  Usually:   7/10  Today:    7/10    Pain Disability Index  Family/Home Responsibilities:: 7  Recreation:: 7  Social Activity:: 7  Occupation:: 7  Sexual Behavior:: 7  Self Care:: 10  Life-Support Activities:: 10  Pain Disability Index (PDI): 55    Review of Systems   Constitutional:  Negative for activity change, appetite change, chills, fatigue, fever and unexpected weight change.   HENT:  Negative for hearing  loss.    Eyes:  Negative for visual disturbance.   Respiratory:  Negative for chest tightness and shortness of breath.    Cardiovascular:  Negative for chest pain.   Gastrointestinal:  Negative for abdominal pain, constipation, diarrhea, nausea and vomiting.   Genitourinary:  Negative for difficulty urinating.   Musculoskeletal:  Positive for arthralgias, back pain, gait problem and myalgias. Negative for neck pain.   Skin:  Negative for rash.   Neurological:  Negative for dizziness, weakness, light-headedness, numbness and headaches.   Psychiatric/Behavioral:  Positive for sleep disturbance. Negative for hallucinations and suicidal ideas. The patient is not nervous/anxious.      Past Medical History:   Diagnosis Date    DDD (degenerative disc disease), lumbar 4/14/2023    Pulmonary embolism     Stroke        Past Surgical History:   Procedure Laterality Date    COLONOSCOPY N/A 4/23/2021    Procedure: COLONOSCOPY;  Surgeon: Ranjith Garcia MD;  Location: [address];  Service: Endoscopy;  Laterality: N/A;  positive covid 2/12/21-BB    SURGICAL REMOVAL OF LYMPH NODE  1989    right arm       Social History     Socioeconomic History    Marital status:    Occupational History     Employer: [employer]   Tobacco Use    Smoking status: Never   Substance and Sexual Activity    Alcohol use: Yes     Alcohol/week: 1.0 standard drink     Types: 1 Cans of beer per week    Drug use: Never   Social History Narrative    , lives alone, 2 kids.       Review of patient's allergies indicates:   Allergen Reactions    Pcn [penicillins] Hives       Current Outpatient Medications on File Prior to Visit   Medication Sig Dispense Refill    aspirin (ECOTRIN) 81 MG EC tablet Take 81 mg by mouth once daily.      cetirizine (ZYRTEC) 10 MG tablet Take 10 mg by mouth.      meloxicam (MOBIC) 15 MG tablet Take 1 tablet (15 mg total) by mouth once daily. 30 tablet 1    methocarbamoL (ROBAXIN) 500 MG Tab Take 1 tablet (500  mg total) by mouth 3 (three) times daily as needed (muscle spasm). 90 tablet 0    promethazine-dextromethorphan (PROMETHAZINE-DM) 6.25-15 mg/5 mL Syrp Take 5 mLs by mouth 2 (two) times daily as needed.       No current facility-administered medications on file prior to visit.       Objective:      /84 (BP Location: Right arm, Patient Position: Sitting, BP Method: Medium (Automatic))   Pulse (!) 55   Resp 16   Wt 83.9 kg (184 lb 14.4 oz)   SpO2 95%   BMI 27.30 kg/m²     Exam:  GEN:  Well developed, well nourished.  No acute distress.  Normal pain behavior.  HEENT:  No trauma.  Mucous membranes moist.  Nares patent bilaterally.  PSYCH: Normal affect. Thought content appropriate.  CHEST:  Breathing symmetric.  No audible wheezing.  ABD: Soft, non-distended.  SKIN:  Warm, pink, dry.  No rash on exposed areas.    EXT:  No cyanosis, clubbing, or edema.  No color change or changes in nail or hair growth.  NEURO/MUSCULOSKELETAL:  Fully alert, oriented, and appropriate. Speech normal emerson. No cranial nerve deficits.   Gait:  Antalgic.  No trendelenburg sign bilaterally.   Motor Strength:  5/5 motor strength throughout lower extremities.   Sensory:  No sensory deficit in the lower extremities.   Reflexes:  2+ and symmetric throughout.  Downgoing Babinski's bilaterally.  No clonus or spasticity.  L-Spine:  Full ROM with pain on extension.  Positive pain with axial/facet loading bilaterally.  Negative SLR bilaterally.    Positive TTP over midline lower lumbar spine      Imaging:      Narrative & Impression    EXAMINATION:  MRI LUMBAR SPINE WITHOUT CONTRAST     CLINICAL HISTORY:  Low back pain, symptoms persist with > 6wks conservative treatment; Dorsalgia, unspecified     TECHNIQUE:  Multiplanar, multisequence MR images were acquired from the thoracolumbar junction to the sacrum without the administration of contrast.     COMPARISON:  Radiographs 03/03/2023     FINDINGS:  There is a subtle levocurvature of the  lumbar spine.     The alignment appears satisfactory.     The vertebral body heights are well maintained.     Mild disc desiccation and disc space narrowing L3-L4 and L4-5.     No evidence of malignant bone marrow replacement process or infection.     The conus medullaris terminates in good position.     T12-L1 through L2-L3: Unremarkable     L3-L4: Mild disc bulge and mild facet joint osseous hypertrophy, no canal stenosis.  There is mild right foraminal narrowing.     L4-L5: Diffuse disc bulge, subtle posterior annular fissure, mild ligamentum flavum hypertrophy and mild facet joint osseous hypertrophy, no canal stenosis.  There is moderate bilateral foraminal narrowing.     L5-S1: No disc abnormality, no canal stenosis.  There is moderate left and mild right foraminal narrowing.     The paravertebral and paraspinal soft tissues appear normal.  The upper sacrum and upper sacroiliac joints appear normal.     Impression:     Spondylosis of the lumbar spine, no severe canal stenosis.     There are multilevel variable degree of foraminal narrowing  noted at the lower lumbar spine as detailed above.        Electronically signed by: Ellen Cope MD  Date:                                            04/08/2023  Time:                                           16:33       Assessment:       Encounter Diagnoses   Name Primary?    Lumbar spondylosis Yes    DDD (degenerative disc disease), lumbar          Plan:       Suraj was seen today for low-back pain.    Diagnoses and all orders for this visit:    Lumbar spondylosis  -     Ambulatory referral/consult to Physical/Occupational Therapy; Future    DDD (degenerative disc disease), lumbar  -     Ambulatory referral/consult to Physical/Occupational Therapy; Future        Suraj Constantino is a 63 y.o. male with chronic midline/bilateral low back pain.  Exact etiology unclear.  May be related to lower lumbar facet arthropathy versus degenerative disc disease.  Not having  overt radicular symptoms.  Lumbar MRI does show mild lower lumbar facet degeneration.  Also some lumbar degenerative disc disease causing foraminal narrowing on the left side at the L5-S1 level.  Potential compression of the exiting L5 nerve root though patient not having symptoms on examination consistent with L5 radiculopathy.    Pertinent imaging studies reviewed by me. Imaging results were discussed with patient.  Refer to PT for ROM, strengthening, stretching and HEP.  Proceed with podiatry evaluation for left toe pain.  If toe pain felt to be related directly to the foot/toe, then will not likely pursue treatment for left lumbar radiculopathy.  However, if no focal source of toe/foot pain identified by Podiatry, then may consider left L5 transforaminal epidural steroid injection in the future.            This note was created by combination of typed  and M-Modal dictation. Transcription and phonetic errors may be present.  If there are any questions, please contact me.

## 2023-05-01 ENCOUNTER — OFFICE VISIT (OUTPATIENT)
Dept: PODIATRY | Facility: CLINIC | Age: 63
End: 2023-05-01
Payer: COMMERCIAL

## 2023-05-01 VITALS
HEART RATE: 69 BPM | HEIGHT: 69 IN | DIASTOLIC BLOOD PRESSURE: 69 MMHG | BODY MASS INDEX: 27.69 KG/M2 | WEIGHT: 186.94 LBS | SYSTOLIC BLOOD PRESSURE: 109 MMHG

## 2023-05-01 DIAGNOSIS — Z01.818 ENCOUNTER FOR PERIOPERATIVE CONSULTATION: ICD-10-CM

## 2023-05-01 DIAGNOSIS — M25.572 PAIN IN JOINT OF LEFT FOOT: Primary | ICD-10-CM

## 2023-05-01 DIAGNOSIS — M12.872 ARTHROPATHY, TRANSIENT, ANKLE/FOOT, LEFT: ICD-10-CM

## 2023-05-01 DIAGNOSIS — M20.5X2 HALLUX LIMITUS, ACQUIRED, LEFT: ICD-10-CM

## 2023-05-01 DIAGNOSIS — Z86.711 HISTORY OF PULMONARY EMBOLISM: ICD-10-CM

## 2023-05-01 PROCEDURE — 1159F PR MEDICATION LIST DOCUMENTED IN MEDICAL RECORD: ICD-10-PCS | Mod: CPTII,S$GLB,, | Performed by: PODIATRIST

## 2023-05-01 PROCEDURE — 1159F MED LIST DOCD IN RCRD: CPT | Mod: CPTII,S$GLB,, | Performed by: PODIATRIST

## 2023-05-01 PROCEDURE — 3078F PR MOST RECENT DIASTOLIC BLOOD PRESSURE < 80 MM HG: ICD-10-PCS | Mod: CPTII,S$GLB,, | Performed by: PODIATRIST

## 2023-05-01 PROCEDURE — 99999 PR PBB SHADOW E&M-EST. PATIENT-LVL III: ICD-10-PCS | Mod: PBBFAC,,, | Performed by: PODIATRIST

## 2023-05-01 PROCEDURE — 3074F PR MOST RECENT SYSTOLIC BLOOD PRESSURE < 130 MM HG: ICD-10-PCS | Mod: CPTII,S$GLB,, | Performed by: PODIATRIST

## 2023-05-01 PROCEDURE — 99213 OFFICE O/P EST LOW 20 MIN: CPT | Mod: PBBFAC,PN | Performed by: PODIATRIST

## 2023-05-01 PROCEDURE — 3074F SYST BP LT 130 MM HG: CPT | Mod: CPTII,S$GLB,, | Performed by: PODIATRIST

## 2023-05-01 PROCEDURE — 99214 OFFICE O/P EST MOD 30 MIN: CPT | Mod: S$GLB,,, | Performed by: PODIATRIST

## 2023-05-01 PROCEDURE — 99999 PR PBB SHADOW E&M-EST. PATIENT-LVL III: CPT | Mod: PBBFAC,,, | Performed by: PODIATRIST

## 2023-05-01 PROCEDURE — 3078F DIAST BP <80 MM HG: CPT | Mod: CPTII,S$GLB,, | Performed by: PODIATRIST

## 2023-05-01 PROCEDURE — 3008F PR BODY MASS INDEX (BMI) DOCUMENTED: ICD-10-PCS | Mod: CPTII,S$GLB,, | Performed by: PODIATRIST

## 2023-05-01 PROCEDURE — 99214 PR OFFICE/OUTPT VISIT, EST, LEVL IV, 30-39 MIN: ICD-10-PCS | Mod: S$GLB,,, | Performed by: PODIATRIST

## 2023-05-01 PROCEDURE — 3008F BODY MASS INDEX DOCD: CPT | Mod: CPTII,S$GLB,, | Performed by: PODIATRIST

## 2023-05-01 NOTE — PROGRESS NOTES
Subjective:    Suraj Constantino is a 63 y.o. male who presents new to this clinic, having seen Dr. Álvarez, just 2-3 wks.ago, w/ c/o pain big toe joint L  foot. States ran track in high school but even jogging hurts. 'Feels like stepped on w/ a high heel' as well as achy. Injections 7-8 yrs.ago. Has used topical & PO NSAIDs w/out much relief. Used wider toe box shoes. No mechanical offloading though. Would like to discuss surgical option. Seen on referral from Dr. Álvarez.    Works @ [employer] in [place]; lives in [place].    Corinne Ramirez MD 3/3/23    Past Medical History:   Diagnosis Date    DDD (degenerative disc disease), lumbar 4/14/2023    Pulmonary embolism     Stroke       Patient Active Problem List   Diagnosis    Acute pulmonary embolism    Right-sided chest pain    Decreased peripheral vision of left eye    Encounter for screening colonoscopy    DDD (degenerative disc disease), lumbar    Lumbar spondylosis     Physical Exam  Vitals reviewed.   Constitutional:       General: He is not in acute distress.     Appearance: He is well-developed and overweight.   Cardiovascular:      Pulses: Normal pulses.           Dorsalis pedis pulses are 2+ on the left side.   Musculoskeletal:         General: Tenderness present. No swelling or signs of injury.      Right lower leg: No edema.      Left lower leg: No edema.      Left foot: Decreased range of motion. Deformity present.   Feet:      Left foot:      Skin integrity: Skin integrity normal.      Comments: Equinus B/L ankles with < 90 deg foot to leg noted with knees extended.      Musculoskeletal strength of extrinsics to foot and ankle B/L + 5/5 in DF/PF/Inv/Ev to resistance with no reproduction of pain in any direction.     Passive ROM of ankle and pedal joints is painless and without crepitation B/L except for 1st MPJ L - limitation in dorsiflexion w/ end bony ROM.<20 degrees.  Dorsal bony prominence 1st MTPJ L     Skin:     General: Skin is warm and dry.       Capillary Refill: Capillary refill takes less than 2 seconds.      Findings: No bruising, erythema or lesion.      Comments: Skin tone, color, turgor is appropriate for age and gender   Neurological:      Mental Status: He is alert and oriented to person, place, and time.      Sensory: Sensation is intact. No sensory deficit.      Motor: Motor function is intact. No weakness.      Gait: Gait is intact. Gait normal.   Psychiatric:         Mood and Affect: Mood and affect normal.         Behavior: Behavior normal. Behavior is cooperative.      X-Ray Foot Complete 3 view Left  Order: [id number]  Status: Final result     Visible to patient: No (inaccessible in Patient Portal)     Next appt: 05/12/2023 at 11:00 AM in Outpatient Rehab ([facility])     Dx: Left foot pain     0 Result Notes  Details    Reading Physician Reading Date Result Priority   Jean Chacon MD  [phone] 3/3/2023 Routine     Narrative & Impression  EXAMINATION:  XR FOOT COMPLETE 3 VIEW LEFT     CLINICAL HISTORY:  .  Pain in left foot     TECHNIQUE:  AP, lateral and oblique views of the left foot were performed.     COMPARISON:  Left foot radiographs dated May 23, 2011     FINDINGS:  Three views of the left foot demonstrate no evidence for acute fracture or dislocation.  There is moderate to severe 1st MTP joint space narrowing, which is progressed when compared with the prior study.  Previously identified radiopaque retained foreign body in the volar soft tissues of the forefoot is again noted.  Joint spaces are otherwise relatively well preserved.  Soft tissues are within normal limits.     Impression:     As above.  Progression of degenerative disease at the 1st MTP        Electronically signed by: Jean Chacon MD  Date:                                            03/03/2023  Time:                                           16:17   I independently reviewed the Xray images. Narrowing of 1st MPJ w/ flattening of met.head, consistent w/ clinical  ssx.    Problem List Items Addressed This Visit    None  Visit Diagnoses       Pain in joint of left foot    -  Primary    Hallux limitus, acquired, left        Arthropathy, transient, ankle/foot, left        History of pulmonary embolism              Discussed general issues surrounding hallux limitus along with the advantages and disadvantages of various tx strategies.  Discussed shoe choice.  Discussed non-prescription inserts.  -Added PPT arch pad & met.bar to insert of shoe.  Discussed the pros and cons of surgery and elected to see respose to offloading pads, along w use of NSAIDs.  Recommended a surgical solution if the severity of sx does not respond to conservative treatment above - (will revisit w/ discussion of 1st MPJ fusion under local w/ MAC sedation; will be NWB minimum 6 wks.up to 3 months in an orthowedge shoe, & how it would affect work - short term disability prn).    FU with me in 3-4 weeks.         A total of 39 mins.was spent on chart review, patient visit & documentation.

## 2023-06-12 ENCOUNTER — TELEPHONE (OUTPATIENT)
Dept: PODIATRY | Facility: CLINIC | Age: 63
End: 2023-06-12
Payer: COMMERCIAL

## 2023-06-12 NOTE — TELEPHONE ENCOUNTER
----- Message from Eliza Pablo sent at 6/12/2023  3:48 PM CDT -----  Regarding: returning missed call  The patient called returning missed call  Please reach out to patient at your conveniecne    No further information provided      Patient can be contacted @# [phone] (home)

## 2023-11-09 ENCOUNTER — TELEPHONE (OUTPATIENT)
Dept: INTERNAL MEDICINE | Facility: CLINIC | Age: 63
End: 2023-11-09
Payer: COMMERCIAL

## 2023-11-09 NOTE — TELEPHONE ENCOUNTER
----- Message from Salma Dee sent at 11/9/2023  9:12 AM CST -----  Contact: [phone]  Pt is requesting a call back to schedule a f/u visit w/Dr Ramirez. The next available appt is 03/01/24. Pt would like a call back to schedule a sooner appt. Pt does not want to see another provider. Please call to schedule.              Thank you

## 2023-11-10 NOTE — TELEPHONE ENCOUNTER
His last annual visit was 3/2023  Does he want to come in for a physical or is he having an issue?

## 2024-01-30 ENCOUNTER — TELEPHONE (OUTPATIENT)
Dept: INTERNAL MEDICINE | Facility: CLINIC | Age: 64
End: 2024-01-30
Payer: COMMERCIAL

## 2024-01-30 NOTE — TELEPHONE ENCOUNTER
----- Message from Mattie Antonio sent at 1/29/2024  5:19 PM CST -----  Type:  Sooner Apoointment Request    Caller is requesting a sooner appointment.  Caller declined first available appointment listed below.  Caller will not accept being placed on the waitlist and is requesting a message be sent to doctor.  Name of Caller: Pt  When is the first available appointment? May 2024  Symptoms: Annual  Would the patient rather a call back or a response via [facility]? Call  Best Call Back Number: [phone]  Additional Information:

## 2024-02-05 ENCOUNTER — HOSPITAL ENCOUNTER (INPATIENT)
Facility: HOSPITAL | Age: 64
LOS: 3 days | Discharge: HOME | DRG: 176 | End: 2024-02-08
Attending: EMERGENCY MEDICINE | Admitting: STUDENT IN AN ORGANIZED HEALTH CARE EDUCATION/TRAINING PROGRAM
Payer: COMMERCIAL

## 2024-02-05 DIAGNOSIS — R07.9 CHEST PAIN: ICD-10-CM

## 2024-02-05 DIAGNOSIS — I26.99 OTHER ACUTE PULMONARY EMBOLISM WITHOUT ACUTE COR PULMONALE: Primary | ICD-10-CM

## 2024-02-05 DIAGNOSIS — I26.99 PULMONARY EMBOLI: ICD-10-CM

## 2024-02-05 PROBLEM — R97.20 ELEVATED PSA: Status: ACTIVE | Noted: 2024-02-05

## 2024-02-05 LAB
ALBUMIN SERPL BCP-MCNC: 4 G/DL (ref 3.5–5.2)
ALP SERPL-CCNC: 65 U/L (ref 55–135)
ALT SERPL W/O P-5'-P-CCNC: 29 U/L (ref 10–44)
ANION GAP SERPL CALC-SCNC: 11 MMOL/L (ref 8–16)
APTT PPP: 28.2 SEC (ref 21–32)
AST SERPL-CCNC: 25 U/L (ref 10–40)
BASOPHILS # BLD AUTO: 0.04 K/UL (ref 0–0.2)
BASOPHILS # BLD AUTO: 0.04 K/UL (ref 0–0.2)
BASOPHILS NFR BLD: 0.7 % (ref 0–1.9)
BASOPHILS NFR BLD: 0.8 % (ref 0–1.9)
BILIRUB SERPL-MCNC: 0.6 MG/DL (ref 0.1–1)
BNP SERPL-MCNC: <10 PG/ML (ref 0–99)
BUN SERPL-MCNC: 9 MG/DL (ref 8–23)
CALCIUM SERPL-MCNC: 9.5 MG/DL (ref 8.7–10.5)
CEA SERPL-MCNC: 2.5 NG/ML (ref 0–5)
CHLORIDE SERPL-SCNC: 106 MMOL/L (ref 95–110)
CO2 SERPL-SCNC: 23 MMOL/L (ref 23–29)
CREAT SERPL-MCNC: 1.4 MG/DL (ref 0.5–1.4)
D DIMER PPP IA.FEU-MCNC: 22.38 MG/L FEU
DIFFERENTIAL METHOD BLD: ABNORMAL
DIFFERENTIAL METHOD BLD: ABNORMAL
EOSINOPHIL # BLD AUTO: 0.1 K/UL (ref 0–0.5)
EOSINOPHIL # BLD AUTO: 0.1 K/UL (ref 0–0.5)
EOSINOPHIL NFR BLD: 2 % (ref 0–8)
EOSINOPHIL NFR BLD: 2.2 % (ref 0–8)
ERYTHROCYTE [DISTWIDTH] IN BLOOD BY AUTOMATED COUNT: 13.2 % (ref 11.5–14.5)
ERYTHROCYTE [DISTWIDTH] IN BLOOD BY AUTOMATED COUNT: 13.2 % (ref 11.5–14.5)
EST. GFR  (NO RACE VARIABLE): 57 ML/MIN/1.73 M^2
GLUCOSE SERPL-MCNC: 104 MG/DL (ref 70–110)
HCT VFR BLD AUTO: 40.6 % (ref 40–54)
HCT VFR BLD AUTO: 42.4 % (ref 40–54)
HGB BLD-MCNC: 13.4 G/DL (ref 14–18)
HGB BLD-MCNC: 14 G/DL (ref 14–18)
IMM GRANULOCYTES # BLD AUTO: 0.01 K/UL (ref 0–0.04)
IMM GRANULOCYTES # BLD AUTO: 0.02 K/UL (ref 0–0.04)
IMM GRANULOCYTES NFR BLD AUTO: 0.2 % (ref 0–0.5)
IMM GRANULOCYTES NFR BLD AUTO: 0.4 % (ref 0–0.5)
INR PPP: 1 (ref 0.8–1.2)
LYMPHOCYTES # BLD AUTO: 1.5 K/UL (ref 1–4.8)
LYMPHOCYTES # BLD AUTO: 1.6 K/UL (ref 1–4.8)
LYMPHOCYTES NFR BLD: 28.4 % (ref 18–48)
LYMPHOCYTES NFR BLD: 29.7 % (ref 18–48)
MCH RBC QN AUTO: 28.3 PG (ref 27–31)
MCH RBC QN AUTO: 28.4 PG (ref 27–31)
MCHC RBC AUTO-ENTMCNC: 33 G/DL (ref 32–36)
MCHC RBC AUTO-ENTMCNC: 33 G/DL (ref 32–36)
MCV RBC AUTO: 86 FL (ref 82–98)
MCV RBC AUTO: 86 FL (ref 82–98)
MONOCYTES # BLD AUTO: 0.9 K/UL (ref 0.3–1)
MONOCYTES # BLD AUTO: 0.9 K/UL (ref 0.3–1)
MONOCYTES NFR BLD: 15.5 % (ref 4–15)
MONOCYTES NFR BLD: 17 % (ref 4–15)
NEUTROPHILS # BLD AUTO: 2.6 K/UL (ref 1.8–7.7)
NEUTROPHILS # BLD AUTO: 3 K/UL (ref 1.8–7.7)
NEUTROPHILS NFR BLD: 49.9 % (ref 38–73)
NEUTROPHILS NFR BLD: 53.2 % (ref 38–73)
NRBC BLD-RTO: 0 /100 WBC
NRBC BLD-RTO: 0 /100 WBC
PLATELET # BLD AUTO: 112 K/UL (ref 150–450)
PLATELET # BLD AUTO: 126 K/UL (ref 150–450)
PMV BLD AUTO: 10.4 FL (ref 9.2–12.9)
PMV BLD AUTO: 9.8 FL (ref 9.2–12.9)
POTASSIUM SERPL-SCNC: 3.7 MMOL/L (ref 3.5–5.1)
PROT SERPL-MCNC: 7.9 G/DL (ref 6–8.4)
PROTHROMBIN TIME: 11.3 SEC (ref 9–12.5)
RBC # BLD AUTO: 4.73 M/UL (ref 4.6–6.2)
RBC # BLD AUTO: 4.93 M/UL (ref 4.6–6.2)
SODIUM SERPL-SCNC: 140 MMOL/L (ref 136–145)
TROPONIN I SERPL DL<=0.01 NG/ML-MCNC: 0.01 NG/ML (ref 0–0.03)
TROPONIN I SERPL DL<=0.01 NG/ML-MCNC: <0.006 NG/ML (ref 0–0.03)
WBC # BLD AUTO: 5.11 K/UL (ref 3.9–12.7)
WBC # BLD AUTO: 5.6 K/UL (ref 3.9–12.7)

## 2024-02-05 PROCEDURE — 12000002 HC ACUTE/MED SURGE SEMI-PRIVATE ROOM

## 2024-02-05 PROCEDURE — 86146 BETA-2 GLYCOPROTEIN ANTIBODY: CPT | Performed by: STUDENT IN AN ORGANIZED HEALTH CARE EDUCATION/TRAINING PROGRAM

## 2024-02-05 PROCEDURE — 85300 ANTITHROMBIN III ACTIVITY: CPT | Performed by: STUDENT IN AN ORGANIZED HEALTH CARE EDUCATION/TRAINING PROGRAM

## 2024-02-05 PROCEDURE — 85025 COMPLETE CBC W/AUTO DIFF WBC: CPT | Mod: 91

## 2024-02-05 PROCEDURE — 85610 PROTHROMBIN TIME: CPT | Performed by: EMERGENCY MEDICINE

## 2024-02-05 PROCEDURE — 82378 CARCINOEMBRYONIC ANTIGEN: CPT | Performed by: STUDENT IN AN ORGANIZED HEALTH CARE EDUCATION/TRAINING PROGRAM

## 2024-02-05 PROCEDURE — 85379 FIBRIN DEGRADATION QUANT: CPT | Performed by: EMERGENCY MEDICINE

## 2024-02-05 PROCEDURE — 81241 F5 GENE: CPT | Performed by: STUDENT IN AN ORGANIZED HEALTH CARE EDUCATION/TRAINING PROGRAM

## 2024-02-05 PROCEDURE — 93005 ELECTROCARDIOGRAM TRACING: CPT

## 2024-02-05 PROCEDURE — 25500020 PHARM REV CODE 255: Performed by: EMERGENCY MEDICINE

## 2024-02-05 PROCEDURE — 85670 THROMBIN TIME PLASMA: CPT | Performed by: STUDENT IN AN ORGANIZED HEALTH CARE EDUCATION/TRAINING PROGRAM

## 2024-02-05 PROCEDURE — 96365 THER/PROPH/DIAG IV INF INIT: CPT

## 2024-02-05 PROCEDURE — 85525 HEPARIN NEUTRALIZATION: CPT | Performed by: STUDENT IN AN ORGANIZED HEALTH CARE EDUCATION/TRAINING PROGRAM

## 2024-02-05 PROCEDURE — 84484 ASSAY OF TROPONIN QUANT: CPT

## 2024-02-05 PROCEDURE — 85025 COMPLETE CBC W/AUTO DIFF WBC: CPT | Performed by: EMERGENCY MEDICINE

## 2024-02-05 PROCEDURE — 63600175 PHARM REV CODE 636 W HCPCS: Performed by: EMERGENCY MEDICINE

## 2024-02-05 PROCEDURE — 93010 ELECTROCARDIOGRAM REPORT: CPT | Mod: ,,, | Performed by: INTERNAL MEDICINE

## 2024-02-05 PROCEDURE — 99285 EMERGENCY DEPT VISIT HI MDM: CPT | Mod: 25

## 2024-02-05 PROCEDURE — 85730 THROMBOPLASTIN TIME PARTIAL: CPT | Performed by: EMERGENCY MEDICINE

## 2024-02-05 PROCEDURE — 82105 ALPHA-FETOPROTEIN SERUM: CPT | Performed by: STUDENT IN AN ORGANIZED HEALTH CARE EDUCATION/TRAINING PROGRAM

## 2024-02-05 PROCEDURE — 80053 COMPREHEN METABOLIC PANEL: CPT

## 2024-02-05 PROCEDURE — 86147 CARDIOLIPIN ANTIBODY EA IG: CPT | Mod: 59 | Performed by: STUDENT IN AN ORGANIZED HEALTH CARE EDUCATION/TRAINING PROGRAM

## 2024-02-05 PROCEDURE — 25000003 PHARM REV CODE 250: Performed by: STUDENT IN AN ORGANIZED HEALTH CARE EDUCATION/TRAINING PROGRAM

## 2024-02-05 PROCEDURE — 84154 ASSAY OF PSA FREE: CPT | Performed by: STUDENT IN AN ORGANIZED HEALTH CARE EDUCATION/TRAINING PROGRAM

## 2024-02-05 PROCEDURE — 86301 IMMUNOASSAY TUMOR CA 19-9: CPT | Performed by: STUDENT IN AN ORGANIZED HEALTH CARE EDUCATION/TRAINING PROGRAM

## 2024-02-05 PROCEDURE — 83880 ASSAY OF NATRIURETIC PEPTIDE: CPT

## 2024-02-05 PROCEDURE — 85613 RUSSELL VIPER VENOM DILUTED: CPT | Performed by: STUDENT IN AN ORGANIZED HEALTH CARE EDUCATION/TRAINING PROGRAM

## 2024-02-05 PROCEDURE — 85635 REPTILASE TEST: CPT | Performed by: STUDENT IN AN ORGANIZED HEALTH CARE EDUCATION/TRAINING PROGRAM

## 2024-02-05 RX ORDER — IBUPROFEN 200 MG
16 TABLET ORAL
Status: DISCONTINUED | OUTPATIENT
Start: 2024-02-05 | End: 2024-02-08 | Stop reason: HOSPADM

## 2024-02-05 RX ORDER — IBUPROFEN 200 MG
24 TABLET ORAL
Status: DISCONTINUED | OUTPATIENT
Start: 2024-02-05 | End: 2024-02-08 | Stop reason: HOSPADM

## 2024-02-05 RX ORDER — ACETAMINOPHEN 325 MG/1
650 TABLET ORAL EVERY 8 HOURS PRN
Status: DISCONTINUED | OUTPATIENT
Start: 2024-02-05 | End: 2024-02-08 | Stop reason: HOSPADM

## 2024-02-05 RX ORDER — GLUCAGON 1 MG
1 KIT INJECTION
Status: DISCONTINUED | OUTPATIENT
Start: 2024-02-05 | End: 2024-02-08 | Stop reason: HOSPADM

## 2024-02-05 RX ORDER — HYDROCODONE BITARTRATE AND ACETAMINOPHEN 5; 325 MG/1; MG/1
1 TABLET ORAL EVERY 4 HOURS PRN
Status: DISCONTINUED | OUTPATIENT
Start: 2024-02-05 | End: 2024-02-08 | Stop reason: HOSPADM

## 2024-02-05 RX ORDER — HEPARIN SODIUM,PORCINE/D5W 25000/250
0-40 INTRAVENOUS SOLUTION INTRAVENOUS CONTINUOUS
Status: DISCONTINUED | OUTPATIENT
Start: 2024-02-05 | End: 2024-02-07

## 2024-02-05 RX ORDER — POLYETHYLENE GLYCOL 3350 17 G/17G
17 POWDER, FOR SOLUTION ORAL 2 TIMES DAILY PRN
Status: DISCONTINUED | OUTPATIENT
Start: 2024-02-05 | End: 2024-02-08 | Stop reason: HOSPADM

## 2024-02-05 RX ORDER — TALC
6 POWDER (GRAM) TOPICAL NIGHTLY
Status: DISCONTINUED | OUTPATIENT
Start: 2024-02-05 | End: 2024-02-08 | Stop reason: HOSPADM

## 2024-02-05 RX ORDER — NALOXONE HCL 0.4 MG/ML
0.02 VIAL (ML) INJECTION
Status: DISCONTINUED | OUTPATIENT
Start: 2024-02-05 | End: 2024-02-08 | Stop reason: HOSPADM

## 2024-02-05 RX ORDER — HYDROMORPHONE HYDROCHLORIDE 1 MG/ML
0.2 INJECTION, SOLUTION INTRAMUSCULAR; INTRAVENOUS; SUBCUTANEOUS
Status: DISCONTINUED | OUTPATIENT
Start: 2024-02-06 | End: 2024-02-07

## 2024-02-05 RX ADMIN — HYDROCODONE BITARTRATE AND ACETAMINOPHEN 1 TABLET: 5; 325 TABLET ORAL at 10:02

## 2024-02-05 RX ADMIN — HEPARIN SODIUM 18 UNITS/KG/HR: 10000 INJECTION, SOLUTION INTRAVENOUS at 07:02

## 2024-02-05 RX ADMIN — MELATONIN TAB 3 MG 6 MG: 3 TAB at 10:02

## 2024-02-05 RX ADMIN — IOHEXOL 75 ML: 350 INJECTION, SOLUTION INTRAVENOUS at 06:02

## 2024-02-05 NOTE — LETTER
February 8, 2024         Aiden VACA  [facility]  [place] [address]  Phone: [phone]  Fax: [phone]       Patient: Suraj Constantino   YOB: 1960  Date of Visit: 02/5-02/08/2024    To Whom It May Concern:    Liudmila Constantino  was at [facility] on 02/5-02/8/2024. The patient may return to work/school on 02/14/2024 with no restrictions. If you have any questions or concerns, or if I can be of further assistance, please do not hesitate to contact me.    Sincerely,    Josise Bateman RN

## 2024-02-06 PROBLEM — Z12.11 ENCOUNTER FOR SCREENING COLONOSCOPY: Status: RESOLVED | Noted: 2021-04-23 | Resolved: 2024-02-06

## 2024-02-06 LAB
AFP SERPL-MCNC: 2.4 NG/ML (ref 0–8.4)
ALBUMIN SERPL BCP-MCNC: 3.5 G/DL (ref 3.5–5.2)
ALP SERPL-CCNC: 59 U/L (ref 55–135)
ALT SERPL W/O P-5'-P-CCNC: 24 U/L (ref 10–44)
ANION GAP SERPL CALC-SCNC: 9 MMOL/L (ref 8–16)
APTT PPP: 128.9 SEC (ref 21–32)
APTT PPP: 46.1 SEC (ref 21–32)
APTT PPP: 65.5 SEC (ref 21–32)
APTT PPP: 78.3 SEC (ref 21–32)
ASCENDING AORTA: 3.13 CM
AST SERPL-CCNC: 19 U/L (ref 10–40)
AV INDEX (PROSTH): 0.71
AV MEAN GRADIENT: 3 MMHG
AV PEAK GRADIENT: 5 MMHG
AV VALVE AREA BY VELOCITY RATIO: 2.41 CM²
AV VALVE AREA: 2.83 CM²
AV VELOCITY RATIO: 0.6
BASOPHILS # BLD AUTO: 0.03 K/UL (ref 0–0.2)
BASOPHILS NFR BLD: 0.6 % (ref 0–1.9)
BILIRUB SERPL-MCNC: 0.7 MG/DL (ref 0.1–1)
BSA FOR ECHO PROCEDURE: 2.04 M2
BUN SERPL-MCNC: 11 MG/DL (ref 8–23)
CALCIUM SERPL-MCNC: 9 MG/DL (ref 8.7–10.5)
CANCER AG19-9 SERPL-ACNC: 3.6 U/ML (ref 0–40)
CHLORIDE SERPL-SCNC: 109 MMOL/L (ref 95–110)
CO2 SERPL-SCNC: 22 MMOL/L (ref 23–29)
CREAT SERPL-MCNC: 1.3 MG/DL (ref 0.5–1.4)
CV ECHO LV RWT: 0.42 CM
DIFFERENTIAL METHOD BLD: ABNORMAL
DOP CALC AO PEAK VEL: 1.1 M/S
DOP CALC AO VTI: 22.4 CM
DOP CALC LVOT AREA: 4 CM2
DOP CALC LVOT DIAMETER: 2.26 CM
DOP CALC LVOT PEAK VEL: 0.66 M/S
DOP CALC LVOT STROKE VOLUME: 63.35 CM3
DOP CALCLVOT PEAK VEL VTI: 15.8 CM
E WAVE DECELERATION TIME: 244.26 MSEC
E/A RATIO: 1.09
E/E' RATIO: 8.43 M/S
ECHO LV POSTERIOR WALL: 0.83 CM (ref 0.6–1.1)
EOSINOPHIL # BLD AUTO: 0.1 K/UL (ref 0–0.5)
EOSINOPHIL NFR BLD: 2 % (ref 0–8)
ERYTHROCYTE [DISTWIDTH] IN BLOOD BY AUTOMATED COUNT: 13.3 % (ref 11.5–14.5)
EST. GFR  (NO RACE VARIABLE): >60 ML/MIN/1.73 M^2
FRACTIONAL SHORTENING: 42 % (ref 28–44)
GLUCOSE SERPL-MCNC: 135 MG/DL (ref 70–110)
HCT VFR BLD AUTO: 39.7 % (ref 40–54)
HGB BLD-MCNC: 13.2 G/DL (ref 14–18)
IMM GRANULOCYTES # BLD AUTO: 0.02 K/UL (ref 0–0.04)
IMM GRANULOCYTES NFR BLD AUTO: 0.4 % (ref 0–0.5)
INTERVENTRICULAR SEPTUM: 0.83 CM (ref 0.6–1.1)
IVC DIAMETER: 1.27 CM
IVRT: 122.74 MSEC
LA MAJOR: 5.1 CM
LA MINOR: 3.59 CM
LA WIDTH: 3.6 CM
LEFT ATRIUM SIZE: 3.2 CM
LEFT ATRIUM VOLUME INDEX: 20.4 ML/M2
LEFT ATRIUM VOLUME: 41.26 CM3
LEFT INTERNAL DIMENSION IN SYSTOLE: 2.32 CM (ref 2.1–4)
LEFT VENTRICLE DIASTOLIC VOLUME INDEX: 34.47 ML/M2
LEFT VENTRICLE DIASTOLIC VOLUME: 69.62 ML
LEFT VENTRICLE MASS INDEX: 48 G/M2
LEFT VENTRICLE SYSTOLIC VOLUME INDEX: 9.1 ML/M2
LEFT VENTRICLE SYSTOLIC VOLUME: 18.45 ML
LEFT VENTRICULAR INTERNAL DIMENSION IN DIASTOLE: 3.99 CM (ref 3.5–6)
LEFT VENTRICULAR MASS: 97.81 G
LV LATERAL E/E' RATIO: 8.43 M/S
LV SEPTAL E/E' RATIO: 8.43 M/S
LVOT MG: 1.12 MMHG
LVOT MV: 0.5 CM/S
LYMPHOCYTES # BLD AUTO: 1.3 K/UL (ref 1–4.8)
LYMPHOCYTES NFR BLD: 23.2 % (ref 18–48)
MAGNESIUM SERPL-MCNC: 1.9 MG/DL (ref 1.6–2.6)
MCH RBC QN AUTO: 28.6 PG (ref 27–31)
MCHC RBC AUTO-ENTMCNC: 33.2 G/DL (ref 32–36)
MCV RBC AUTO: 86 FL (ref 82–98)
MONOCYTES # BLD AUTO: 0.9 K/UL (ref 0.3–1)
MONOCYTES NFR BLD: 16 % (ref 4–15)
MV PEAK A VEL: 0.54 M/S
MV PEAK E VEL: 0.59 M/S
MV STENOSIS PRESSURE HALF TIME: 70.83 MS
MV VALVE AREA P 1/2 METHOD: 3.11 CM2
NEUTROPHILS # BLD AUTO: 3.1 K/UL (ref 1.8–7.7)
NEUTROPHILS NFR BLD: 57.8 % (ref 38–73)
NRBC BLD-RTO: 0 /100 WBC
PHOSPHATE SERPL-MCNC: 4.5 MG/DL (ref 2.7–4.5)
PISA TR MAX VEL: 2.29 M/S
PLATELET # BLD AUTO: 114 K/UL (ref 150–450)
PMV BLD AUTO: 9.9 FL (ref 9.2–12.9)
POTASSIUM SERPL-SCNC: 4.1 MMOL/L (ref 3.5–5.1)
PROSTATE SPECIFIC ANTIGEN, TOTAL: 6.5 NG/ML (ref 0–4)
PROT SERPL-MCNC: 7 G/DL (ref 6–8.4)
PSA FREE MFR SERPL: 5.85 %
PSA FREE SERPL-MCNC: 0.38 NG/ML (ref 0–1.5)
PULM VEIN S/D RATIO: 1.39
PV PEAK D VEL: 0.33 M/S
PV PEAK GRADIENT: 2 MMHG
PV PEAK S VEL: 0.46 M/S
PV PEAK VELOCITY: 0.7 M/S
RA MAJOR: 5.22 CM
RA PRESSURE ESTIMATED: 3 MMHG
RA WIDTH: 4.01 CM
RBC # BLD AUTO: 4.62 M/UL (ref 4.6–6.2)
RIGHT VENTRICULAR END-DIASTOLIC DIMENSION: 3.53 CM
RV TB RVSP: 5 MMHG
SINUS: 3.86 CM
SODIUM SERPL-SCNC: 140 MMOL/L (ref 136–145)
STJ: 2.95 CM
TDI LATERAL: 0.07 M/S
TDI SEPTAL: 0.07 M/S
TDI: 0.07 M/S
TR MAX PG: 21 MMHG
TRICUSPID ANNULAR PLANE SYSTOLIC EXCURSION: 2.01 CM
TV PEAK GRADIENT: 1 MMHG
TV REST PULMONARY ARTERY PRESSURE: 24 MMHG
WBC # BLD AUTO: 5.38 K/UL (ref 3.9–12.7)
Z-SCORE OF LEFT VENTRICULAR DIMENSION IN END DIASTOLE: -4
Z-SCORE OF LEFT VENTRICULAR DIMENSION IN END SYSTOLE: -3.58

## 2024-02-06 PROCEDURE — 25000003 PHARM REV CODE 250: Performed by: STUDENT IN AN ORGANIZED HEALTH CARE EDUCATION/TRAINING PROGRAM

## 2024-02-06 PROCEDURE — 63600175 PHARM REV CODE 636 W HCPCS: Performed by: EMERGENCY MEDICINE

## 2024-02-06 PROCEDURE — 83735 ASSAY OF MAGNESIUM: CPT | Performed by: STUDENT IN AN ORGANIZED HEALTH CARE EDUCATION/TRAINING PROGRAM

## 2024-02-06 PROCEDURE — 11000001 HC ACUTE MED/SURG PRIVATE ROOM

## 2024-02-06 PROCEDURE — 25000003 PHARM REV CODE 250: Performed by: HOSPITALIST

## 2024-02-06 PROCEDURE — 84100 ASSAY OF PHOSPHORUS: CPT | Performed by: STUDENT IN AN ORGANIZED HEALTH CARE EDUCATION/TRAINING PROGRAM

## 2024-02-06 PROCEDURE — 63600175 PHARM REV CODE 636 W HCPCS: Performed by: STUDENT IN AN ORGANIZED HEALTH CARE EDUCATION/TRAINING PROGRAM

## 2024-02-06 PROCEDURE — 85730 THROMBOPLASTIN TIME PARTIAL: CPT | Mod: 91 | Performed by: EMERGENCY MEDICINE

## 2024-02-06 PROCEDURE — 85730 THROMBOPLASTIN TIME PARTIAL: CPT | Performed by: HOSPITALIST

## 2024-02-06 PROCEDURE — 36415 COLL VENOUS BLD VENIPUNCTURE: CPT | Mod: XB | Performed by: HOSPITALIST

## 2024-02-06 PROCEDURE — 99291 CRITICAL CARE FIRST HOUR: CPT | Mod: ,,, | Performed by: INTERNAL MEDICINE

## 2024-02-06 PROCEDURE — 85730 THROMBOPLASTIN TIME PARTIAL: CPT | Mod: 91 | Performed by: HOSPITALIST

## 2024-02-06 PROCEDURE — 21400001 HC TELEMETRY ROOM

## 2024-02-06 PROCEDURE — 80053 COMPREHEN METABOLIC PANEL: CPT | Performed by: STUDENT IN AN ORGANIZED HEALTH CARE EDUCATION/TRAINING PROGRAM

## 2024-02-06 PROCEDURE — 85025 COMPLETE CBC W/AUTO DIFF WBC: CPT | Performed by: EMERGENCY MEDICINE

## 2024-02-06 RX ADMIN — HYDROCODONE BITARTRATE AND ACETAMINOPHEN 1 TABLET: 5; 325 TABLET ORAL at 11:02

## 2024-02-06 RX ADMIN — HYDROMORPHONE HYDROCHLORIDE 0.2 MG: 1 INJECTION, SOLUTION INTRAMUSCULAR; INTRAVENOUS; SUBCUTANEOUS at 05:02

## 2024-02-06 RX ADMIN — HYDROCODONE BITARTRATE AND ACETAMINOPHEN 1 TABLET: 5; 325 TABLET ORAL at 08:02

## 2024-02-06 RX ADMIN — HEPARIN SODIUM 11 UNITS/KG/HR: 10000 INJECTION, SOLUTION INTRAVENOUS at 02:02

## 2024-02-06 RX ADMIN — MELATONIN TAB 3 MG 6 MG: 3 TAB at 08:02

## 2024-02-06 RX ADMIN — HEPARIN SODIUM 14 UNITS/KG/HR: 10000 INJECTION, SOLUTION INTRAVENOUS at 07:02

## 2024-02-06 RX ADMIN — HYDROMORPHONE HYDROCHLORIDE 0.2 MG: 1 INJECTION, SOLUTION INTRAMUSCULAR; INTRAVENOUS; SUBCUTANEOUS at 12:02

## 2024-02-06 NOTE — PLAN OF CARE
[facility] - Med Surg  Initial Discharge Assessment       Primary Care Provider: Corinne Ramirez MD  Case Management Assessment     PCP: See above  Pharmacy: [facility] in [place]    Patient Arrived From: Home with spouse  Existing Help at Home: wife    Barriers to Discharge: None    Discharge Plan:    A. Home   B. Home      Discharge planning assessment completed with assistance from patient and spouse.  Patient is from home and independent.  He does not have any DME or OP services.  He does not take any prescribed medication.  When medically cleared, he will discharge to home. He will need followup appointments as ordered by the discharging provider.        Admission Diagnosis: Pulmonary emboli [I26.99]  Chest pain [R07.9]  Acute pulmonary embolism [I26.99]    Admission Date: 2/5/2024  Expected Discharge Date:     Transition of Care Barriers: None    Payor: BLUE CROSS BLUE SHIELD / Plan: BCBS ALL OUT OF STATE / Product Type: PPO /     Extended Emergency Contact Information  Primary Emergency Contact: DougieCassie  Address: [address] [place] [address] [place] [address] [facility]  Home Phone: [phone]  Relation: Spouse  Secondary Emergency Contact: Yamel Thomas   [place]  Mobile Phone: [phone]  Relation: Sister    Discharge Plan A: Home  Discharge Plan B: Home      [facility] [address] [place] [address]  Phone: [phone] Fax: [phone]    [address] [place], [place] [address] [facility]  [address] [facility]  [place] [address]  Phone: [phone] Fax: [phone]      Initial Assessment (most recent)       Adult Discharge Assessment - 02/06/24 1746          Discharge Assessment    Assessment Type Discharge Planning Assessment     Confirmed/corrected address, phone number and insurance Yes     Confirmed Demographics Correct on Facesheet     Source of Information patient;family     Communicated  [place] with patient/caregiver No     Reason For Admission PE     People in Home spouse     Facility Arrived From: home     Do you expect to return to your current living situation? Yes     Do you have help at home or someone to help you manage your care at home? Yes     Who are your caregiver(s) and their phone number(s)? wife:Cassie Constantino (Spouse) [phone] (Home Phone)     Prior to hospitilization cognitive status: Alert/Oriented     Current cognitive status: Alert/Oriented     Walking or Climbing Stairs Difficulty no     Dressing/Bathing Difficulty no     Equipment Currently Used at Home none     Readmission within 30 days? No     Patient currently being followed by outpatient case management? No     Do you currently have service(s) that help you manage your care at home? No     Do you take prescription medications? No     Do you have prescription coverage? Yes     Coverage Payor: [place] CROSS BLUE SHIELD - BCBS ALL OUT OF STATE -     Do you have any problems affording any of your prescribed medications? TBD     Is the patient taking medications as prescribed? --   n/a    Who is going to help you get home at discharge? Cassie Constantino (Spouse)     How do you get to doctors appointments? car, drives self     Are you on dialysis? No     Do you take coumadin? No     Discharge Plan A Home     Discharge Plan B Home     DME Needed Upon Discharge  none     Discharge Plan discussed with: Spouse/sig other;Patient     Transition of Care Barriers None

## 2024-02-06 NOTE — NURSING
Patient transported to [facility] via wheelchair, in NAD, VSS, on tele NSR, on 2L NC. On heparin 11 u/kg/hr, handoff w/ receiving RN. Wife notified of POC.NPO @ midnight.

## 2024-02-06 NOTE — ASSESSMENT & PLAN NOTE
Multiple R sided Pulmonary Emboli   Patient had previous clot 6 years ago, but stopped AC after 90 days  DVT/PE Hypercoagulable Workup:  Anti-Thrombin III  Factor V Leiden (FVL)  Lupus Anti-coagulant (DRVVT)  Cardiolipin Ab  Beta-2 glycoprotein   PSA was elevated for the first time last year, previously very low, now 5  Repeat PSA, f/u urology and potentially oncology if very elevated  Having worsening urinary symptoms/BPH now  Will also, check CEA, CA 19-9 and AFP, but less likely  Will get b/l lower ext US as well, to r/o DVTs  Noted to have RH strain on CT, concerning, will watch in ICU for 24-48h  Did not have RV dilation/pHTN/chronic lung issues on last ECHO that would account for this  ECHO ordered  Card consulted

## 2024-02-06 NOTE — PROVIDER TRANSFER
Mr Suraj Constantino is a 63 y.o. man who was admitted with multiple R sided pulmonary emboli. He was started on heparin gtt and admitted to ICU with concerns for R heart strain. TTE completed and shows normal R heart function. Cardiology Dr Romero consulted to discuss thrombectomy because he is very symptomatic at rest. V/Q scan ordered per Dr Romero. Considering thrombectomy on 2/7/24. Continues on heparin gtt.     To do  - follow up VQ scan and Dr Romero plans. He is NPO in case of intervention 2/7/24  - will need life long anticoagulation  - will need follow up with Hematology for hypercoag workup    Zenaida Cameron MD  02/06/2024 1:23 PM

## 2024-02-06 NOTE — ED PROVIDER NOTES
"Encounter Date: 2/5/2024    SCRIBE #1 NOTE: I, Li Geovani, am scribing for, and in the presence of,  Dipesh Ji MD. I have scribed the following portions of the note - Other sections scribed: HPI, ROS.       History     Chief Complaint   Patient presents with    Chest Pain     Pt reports to the ED with C/O mid sternal CP that radiates to the right chest that started last night. Pt also reports SOB with exertion. Pt denies any N/V with the pain. Pt reports "I feel like someone is punching me in my chest." Pt denies any long car rides or trips. Pt sating 90% on RA. Pt placed in DODIE bed for eval.      63 y.o. male, with a PMHx of PE, CVA, hypogonadism, who presents to the ED with right-sided constant, worsening chest pain for 1 day. Patient reports he was driving after going to a parade yesterday when he suddenly began experiencing the pain. States the breathing normally is painful and he is having some SOB. Also reports some pain and cramping in his left calf that began today. States that last time he felt chest pain that was similar he had to be admitted to the hospital. No other exacerbating or alleviating factors.     The history is provided by the patient. No  was used.     Review of patient's allergies indicates:   Allergen Reactions    Pcn [penicillins] Hives     Past Medical History:   Diagnosis Date    DDD (degenerative disc disease), lumbar 4/14/2023    Pulmonary embolism     Stroke      Past Surgical History:   Procedure Laterality Date    COLONOSCOPY N/A 4/23/2021    Procedure: COLONOSCOPY;  Surgeon: Ranjith Garcia MD;  Location: [address]);  Service: Endoscopy;  Laterality: N/A;  positive covid 2/12/21-BB    SURGICAL REMOVAL OF LYMPH NODE  1989    right arm     Family History   Problem Relation Age of Onset    Heart disease Father      Social History     Tobacco Use    Smoking status: Never    Smokeless tobacco: Never   Substance Use Topics    Alcohol use: " Yes     Alcohol/week: 1.0 standard drink of alcohol     Types: 1 Cans of beer per week    Drug use: Never     Review of Systems   Constitutional:  Negative for fever.   HENT:  Negative for sore throat.    Respiratory:  Positive for shortness of breath. Negative for cough.    Cardiovascular:  Positive for chest pain. Negative for leg swelling.   Gastrointestinal:  Negative for abdominal pain, diarrhea, nausea and vomiting.   Genitourinary:  Negative for dysuria and hematuria.   Musculoskeletal:  Positive for myalgias (left calf). Negative for back pain and neck pain.   Skin:  Negative for rash.   Neurological:  Negative for syncope.       Physical Exam     Initial Vitals [02/05/24 1745]   BP Pulse Resp Temp SpO2   135/75 86 18 98.4 °F (36.9 °C) (!) 92 %      MAP       --         Physical Exam    Nursing note and vitals reviewed.  Constitutional: He appears well-developed. He is not diaphoretic. He appears distressed (mildly).   HENT:   Head: Normocephalic and atraumatic.   Nose: Nose normal.   Eyes: EOM are normal. Pupils are equal, round, and reactive to light.   Neck: Neck supple. No tracheal deviation present. No JVD present.   Normal range of motion.  Cardiovascular:  Normal rate, regular rhythm, normal heart sounds and intact distal pulses.           Pulmonary/Chest: He is in respiratory distress (diminished breath sounds bilaterally secondary to depth of inspiration which is limited to pain). He has no wheezes. He has no rhonchi. He has no rales. He exhibits no tenderness.   Abdominal: Abdomen is soft. Bowel sounds are normal. He exhibits no distension. There is no abdominal tenderness. There is no rebound and no guarding.   Musculoskeletal:         General: No tenderness or edema. Normal range of motion.      Cervical back: Normal range of motion and neck supple.     Neurological: He is alert and oriented to person, place, and time. He has normal strength.   Skin: Skin is warm and dry. Capillary refill takes  less than 2 seconds. No rash noted. No erythema.         ED Course   Critical Care    Date/Time: 2/5/2024 9:52 PM    Performed by: Dipesh Ji MD  Authorized by: Dipesh Ji MD  Direct patient critical care time: 15 minutes  Additional history critical care time: 10 minutes  Ordering / reviewing critical care time: 5 minutes  Documentation critical care time: 5 minutes  Consulting other physicians critical care time: 5 minutes  Total critical care time (exclusive of procedural time) : 40 minutes  Critical care time was exclusive of separately billable procedures and treating other patients and teaching time.  Critical care was necessary to treat or prevent imminent or life-threatening deterioration of the following conditions: cardiac failure, circulatory failure, CNS failure or compromise and shock.  Critical care was time spent personally by me on the following activities: development of treatment plan with patient or surrogate, evaluation of patient's response to treatment, examination of patient, obtaining history from patient or surrogate, ordering and performing treatments and interventions, ordering and review of laboratory studies, ordering and review of radiographic studies, re-evaluation of patient's condition and review of old charts.        Labs Reviewed   CBC W/ AUTO DIFFERENTIAL - Abnormal; Notable for the following components:       Result Value    Platelets 126 (*)     Mono % 15.5 (*)     All other components within normal limits   COMPREHENSIVE METABOLIC PANEL - Abnormal; Notable for the following components:    eGFR 57 (*)     All other components within normal limits   D DIMER, QUANTITATIVE - Abnormal; Notable for the following components:    D-Dimer 22.38 (*)     All other components within normal limits   CBC W/ AUTO DIFFERENTIAL - Abnormal; Notable for the following components:    Hemoglobin 13.4 (*)     Platelets 112 (*)     Mono % 17.0 (*)     All other components within  normal limits    Narrative:     Draw baseline aPTT prior to starting the heparin bolus or  infusion  (if patient is on warfarin prior to heparin therapy)   TROPONIN I   TROPONIN I   B-TYPE NATRIURETIC PEPTIDE   APTT    Narrative:     Draw baseline aPTT prior to starting the heparin bolus or  infusion  (if patient is on warfarin prior to heparin therapy)   PROTIME-INR    Narrative:     Draw baseline aPTT prior to starting the heparin bolus or  infusion  (if patient is on warfarin prior to heparin therapy)   CEA    Narrative:     Release to patient->Immediate   ANTIPHOSPHOLIPID AB (ANTICARDIOLIPIN)   BETA-2 GLYCOPROTEIN ANTIBODIES   LUPUS ANTICOAGULANT (DRVVT)   FACTOR 5 LEIDEN   ANTITHROMBIN III   PSA, TOTAL AND FREE   CANCER ANTIGEN 19-9   AFP TUMOR MARKER          Imaging Results              CTA Chest Non-Coronary (PE Studies) (Edited Result - FINAL)  Result time 02/05/24 21:20:47      Addendum (preliminary) 1 of 1 by Joan Cheatham MD (02/05/24 21:20:47)      The first sentence of the findings should read: There are multiple right-sided pulmonary emboli.      Electronically signed by: Joan Cheatham  Date:    02/05/2024  Time:    21:20                 Final result by Joan Cheatham MD (02/05/24 19:39:32)                   Impression:      Multiple right-sided pulmonary emboli.    8 x 7 mm nodular density abutting the right middle lobe.  Findings may represent a perifissural lymph node, part of the normal pleural reflection or a pulmonary nodule.  This was seen on CT abdomen pelvis with contrast on 01/01/2017.    Bronchiectatic changes.  Ground-glass opacities in the lower lung fields, which may be due to atelectatic changes.  An infectious process may have a similar appearance.    Findings were called to Dr. Ji at 19:37 on 02/05/2024.    This report was flagged in Epic as abnormal.      Electronically signed by: Joan Cheatham  Date:    02/05/2024  Time:    19:39               Narrative:     EXAMINATION:  CT PULMONARY ANGIOGRAM WITH CONTRAST    CLINICAL HISTORY:  Shortness of breath.    TECHNIQUE:  CT of the chest with intravenous contrast for pulmonary artery angiogram was performed. Contiguous axial 1.25 mm images followed by 10 mm reconstructions with multiplanar and MIP reformations of the pulmonary arteries. No 3D post-angiographic imaging was performed on an independent workstation and reviewed.  75 ml of Omnipaque 350 was injected.    COMPARISON:  CT abdomen pelvis 01/01/2017    FINDINGS:  There is no evidence of pulmonary artery filling defect to suggest pulmonary embolism. There is no aortic aneurysm or aortic dissection.    There is a an 8 x 7 mm nodular density in the right middle lobe abutting the minor fissure (series 3 axial image 262).  On the prior CT abdomen pelvis from 01/01/2017, a 7 mm right middle lobe pulmonary nodule was reported.  Bronchiectatic changes are seen at the lung bases.  Ground-glass opacities are seen at the lung bases and in the right middle lobe and lingula.    There is no evidence of mediastinal, hilar, or axillary adenopathy.    There is no pleural or pericardial effusion.    The heart size is within normal limits.  Right ventricular strain is suspected.    In the visualized upper abdomen, calcified granulomas are seen in the liver.                                       X-Ray Chest AP Portable (Final result)  Result time 02/05/24 18:49:38      Final result by Joan Cheatham MD (02/05/24 18:49:38)                   Impression:      Increased interstitial lung markings.  Findings may be related to interstitial edema or interstitial infiltrates.      Electronically signed by: Joan Cheatham  Date:    02/05/2024  Time:    18:49               Narrative:    EXAMINATION:  AP PORTABLE CHEST    CLINICAL HISTORY:  Chest Pain;    TECHNIQUE:  AP portable chest radiograph was submitted.    COMPARISON:  01/01/2017    FINDINGS:  AP portable chest radiograph demonstrates a  cardiac silhouette within normal limits.  There are increased interstitial nonspecific lung markings.  There is no pneumothorax or pleural effusion.  There is decreased inspiratory effort when compared to the study 01/01/2017.                                       Medications   heparin 25,000 units in dextrose 5% 250 mL (100 units/mL) infusion HIGH INTENSITY nomogram - [facility] (18 Units/kg/hr × 79.6 kg (Adjusted) Intravenous New Bag 2/5/24 1956)   heparin 25,000 units in dextrose 5% (100 units/ml) IV bolus from bag HIGH INTENSITY nomogram - [facility] (has no administration in time range)   heparin 25,000 units in dextrose 5% (100 units/ml) IV bolus from bag HIGH INTENSITY nomogram - [facility] (has no administration in time range)   melatonin tablet 6 mg (has no administration in time range)   polyethylene glycol packet 17 g (has no administration in time range)   acetaminophen tablet 650 mg (has no administration in time range)   naloxone 0.4 mg/mL injection 0.02 mg (has no administration in time range)   glucose chewable tablet 16 g (has no administration in time range)   glucose chewable tablet 24 g (has no administration in time range)   glucagon (human recombinant) injection 1 mg (has no administration in time range)   iohexoL (OMNIPAQUE 350) injection 75 mL (75 mLs Intravenous Given 2/5/24 1858)   heparin 25,000 units in dextrose 5% (100 units/ml) IV bolus from bag HIGH INTENSITY nomogram - [facility] (6,368 Units Intravenous Bolus from Bag 2/5/24 1959)     Medical Decision Making  Amount and/or Complexity of Data Reviewed  Labs: ordered.  Radiology: ordered.  ECG/medicine tests: ordered and independent interpretation performed.    Risk  Prescription drug management.  Decision regarding hospitalization.      MDM:    63-year-old male with past medical history as noted above presenting with chest pain, shortness of breath.  Differential Diagnosis includes:  PE, acute mi/ACS, arrhythmia, pericarditis, aortic dissection.  Physical exam  as noted above.  ED workup notable for CBC with hemoglobin 14.0, CMP within normal limits, troponin negative, BNP negative, D-dimer 22.38, CTA shows multiple right-sided pulmonary emboli, chest x-ray shows increased interstitial markings otherwise unremarkable.  EKG shows normal sinus rhythm rate of 88 beats per minute, nonspecific ST and T-wave inversions noted inferiorly which is change when compared to prior,  MS, no STEMI.  Patient presentation consistent with PE with evidence of right heart strain negative cardiac markers and mild hypoxemia 89-90% on room air.  Started on heparin infusion, discussed further with Hospital Medicine team who will admit to the ICU for closer monitoring, cardiology consulted, echo and ultrasound DVT pending.  Discussed diagnosis and further treatment with patient at bedside. All questions answered, patient transferred to ICU in stable condition.      Note was created using voice recognition software. Note may have occasional typographical or grammatical errors, garbled syntax, and other bizarre constructions that may not have been identified and edited despite good isabella initial review prior to signing.               Scribe Attestation:   Scribe #1: I performed the above scribed service and the documentation accurately describes the services I performed. I attest to the accuracy of the note.                               Clinical Impression:  Final diagnoses:  [R07.9] Chest pain          ED Disposition Condition    Admit              I, Dipesh Ji M.D., personally performed the services described in this documentation. All medical record entries made by the scribe were at my direction and in my presence.  I have reviewed the chart and agree that the record reflects my personal performance and is accurate and complete.      Dipesh Ji MD  02/05/24 [id number]

## 2024-02-06 NOTE — ED TRIAGE NOTES
Chest pain primarily to right side that started yesterday evening. Unable to take deep breath. Feels SOB with exertion. EKG obtained. Placed on cardiac monitor.

## 2024-02-06 NOTE — HOSPITAL COURSE
Mr Suraj Constantino is a 63 y.o. man who was admitted with multiple R sided pulmonary emboli.  Ultrasound lower extremity with no evidence of DVT.  He was started on heparin gtt and admitted to ICU with concerns for R heart strain. TTE completed and shows normal R heart function.  RV to LV ratio less than 0.9.  Cardiology Dr Romero consulted.  Heparin gtt discontinued this afternoon.  Transitioned to Eliquis 10 mg b.i.d. for 7 days followed by 5 mg b.i.d. lifelong.  To follow-up with cardiology and heme Onc clinic as outpatient.  Patient's oxygen saturations at 94-96% with ambulation in [facility]

## 2024-02-06 NOTE — PLAN OF CARE
Patient arrived from ED on heparin @ 14u/kg/hr. C/o constant right sided CP, non-radiating 6/10. Pt in NSR, on 2L NC 90%. Denies SOB. NADN. Safety precautions in place.    Problem: Adult Inpatient Plan of Care  Goal: Plan of Care Review  Outcome: Ongoing, Progressing  Goal: Patient-Specific Goal (Individualized)  Outcome: Ongoing, Progressing  Goal: Absence of Hospital-Acquired Illness or Injury  Outcome: Ongoing, Progressing  Goal: Optimal Comfort and Wellbeing  Outcome: Ongoing, Progressing  Goal: Readiness for Transition of Care  Outcome: Ongoing, Progressing     Problem: Pain Acute  Goal: Acceptable Pain Control and Functional Ability  Outcome: Ongoing, Progressing     Problem: Cardiac Impairment  Goal: Optimal Activity Tolerance  Outcome: Ongoing, Progressing

## 2024-02-06 NOTE — SUBJECTIVE & OBJECTIVE
Interval History: Sitting in chair near the window. Wife at his side. He has chest aches on the R side. He is short of breath with talking. At rest, SpO2 89-90% on room air.     Review of Systems   Constitutional:  Negative for chills and fever.   Respiratory:  Positive for shortness of breath. Negative for cough and chest tightness.    Cardiovascular:  Positive for chest pain. Negative for leg swelling.   Gastrointestinal:  Negative for abdominal pain.   Genitourinary:  Negative for difficulty urinating.   Psychiatric/Behavioral:  Negative for confusion.      Objective:     Vital Signs (Most Recent):  Temp: 98.3 °F (36.8 °C) (02/06/24 0730)  Pulse: 70 (02/06/24 1101)  Resp: 16 (02/06/24 1101)  BP: 126/80 (02/06/24 1101)  SpO2: (!) 94 % (02/06/24 1101) Vital Signs (24h Range):  Temp:  [98.3 °F (36.8 °C)-98.4 °F (36.9 °C)] 98.3 °F (36.8 °C)  Pulse:  [63-86] 70  Resp:  [12-30] 16  SpO2:  [90 %-97 %] 94 %  BP: ()/(65-88) 126/80     Weight: 85.6 kg (188 lb 11.4 oz)  Body mass index is 27.87 kg/m².    Intake/Output Summary (Last 24 hours) at 2/6/2024 1144  Last data filed at 2/6/2024 1101  Gross per 24 hour   Intake 226.51 ml   Output --   Net 226.51 ml         Physical Exam  Vitals and nursing note reviewed.   Constitutional:       General: He is not in acute distress.     Appearance: He is not toxic-appearing.   HENT:      Head: Normocephalic and atraumatic.      Nose: Nose normal.      Mouth/Throat:      Mouth: Mucous membranes are moist.   Cardiovascular:      Rate and Rhythm: Normal rate and regular rhythm.   Pulmonary:      Effort: Pulmonary effort is normal.      Breath sounds: Normal breath sounds.      Comments: 2L NC  Abdominal:      General: Bowel sounds are normal.      Palpations: Abdomen is soft.   Musculoskeletal:      Right lower leg: No edema.      Left lower leg: No edema.   Skin:     General: Skin is warm and dry.   Neurological:      Mental Status: He is alert. Mental status is at baseline.              Significant Labs: All pertinent labs within the past 24 hours have been reviewed.    Significant Imaging: I have reviewed all pertinent imaging results/findings within the past 24 hours.

## 2024-02-06 NOTE — ADMISSIONCARE
AdmissionCare    Guideline: Pulmonary Embolism - INPT, Inpatient    Based on the indications selected for the patient, the bed status of Inpatient was determined to be MET    The following indications were selected as present at the time of evaluation of the patient:      - Right ventricular dysfunction or strain (eg, by echocardiogram, CT angiogram, or ECG)    AdmissionCare documentation entered by: Maryann Plata    [facility], 27th edition, Copyright © 2023 [facility], [facility] All Rights Reserved.  [id number]:00

## 2024-02-06 NOTE — ASSESSMENT & PLAN NOTE
Multiple R sided Pulmonary Emboli   Patient had previous clot 6 years ago, but stopped AC after 90 days  DVT/PE Hypercoagulable Workup is in process  Anti-Thrombin III  Factor V Leiden (FVL)  Lupus Anti-coagulant (DRVVT)  Cardiolipin Ab  Beta-2 glycoprotein   Lower extremity US negative for DVT  TTE completed- RV function and size normal, PASP 24  Continues on heparin gtt  Cardiology Dr Romero consulted to discuss potential thrombectomy

## 2024-02-06 NOTE — ASSESSMENT & PLAN NOTE
Patient has been seeing a steady rise over past 4-5 years.  Repeat PSA ordered  Consult Urology if higher      Latest Reference Range & Units 02/19/09 12:04 05/28/11 09:50 06/13/19 10:37 02/12/21 14:51 03/03/23 15:54   Prostate Specific Antigen 0.00 - 4.00 ng/mL 0.53 0.84 1.2 2.6 5.2 (H)

## 2024-02-06 NOTE — HPI
"  Suraj Constantino is a 63 y.o. male who has a past medical history of Acute pulmonary emboli, DDD, lumbar, Elevated PSA, Pulmonary embolism, and Stroke, presented to the ED with CC of R chest pain.     Patient was feeling SOBOE as well as R chest pain. Not reproducible with palpations or deep breathing. He feels like someone punched him in the chest. He is mildly hypoxic on arrival with D-dimer 22, CTA showed multiple emboli on R lung. CTA showed evidence of RH strain. He has not chronic lung diseases and fairly normal ECHO/stress a few years ago. Patient denies being sedentary and works a lot. He has had another clot back 6 years ago, he was on Apix for 3 months. At one point, there was a doctor concerned he had lupus back in 1989. They also thought he had cancer because he had multiple large lymph nodes swollen then and night sweats "drenching the bed." But biopsy was negative and it eventually went away.. Patients PSA's have all been normal and low, however this last one was high at 5 (he seemed unaware of this). Repeat ordered. Colonoscopy did not find much 3 years or so ago, but was due back in 5 years bc of semi-poor prep. Started on Hep gtt; and given ?RH strain on CTA, is being watched in ICU.   "

## 2024-02-06 NOTE — SUBJECTIVE & OBJECTIVE
Past Medical History:   Diagnosis Date    Acute pulmonary emboli 01/01/2017    DDD (degenerative disc disease), lumbar 04/14/2023    Elevated PSA 02/05/2024    Pulmonary embolism     Stroke        Past Surgical History:   Procedure Laterality Date    COLONOSCOPY N/A 4/23/2021    Procedure: COLONOSCOPY;  Surgeon: Ranjith Garcia MD;  Location: [facility] ([address] [place] [address]);  Service: Endoscopy;  Laterality: N/A;  positive covid 2/12/21-BB    SURGICAL REMOVAL OF LYMPH NODE  1989    right arm       Review of patient's allergies indicates:   Allergen Reactions    Pcn [penicillins] Hives       No current facility-administered medications on file prior to encounter.     No current outpatient medications on file prior to encounter.     Family History       Problem Relation (Age of Onset)    Heart disease Father          Tobacco Use    Smoking status: Never    Smokeless tobacco: Never   Substance and Sexual Activity    Alcohol use: Yes     Alcohol/week: 1.0 standard drink of alcohol     Types: 1 Cans of beer per week    Drug use: Never    Sexual activity: Not on file     Review of Systems   Constitutional: Negative.   HENT: Negative.     Eyes: Negative.    Cardiovascular:  Positive for chest pain and dyspnea on exertion.          Pleuritic right-sided chest pain on deep inhalation   Respiratory:  Positive for shortness of breath.    Endocrine: Negative.    Hematologic/Lymphatic: Negative.    Skin: Negative.    Musculoskeletal: Negative.    Gastrointestinal: Negative.    Genitourinary: Negative.    Neurological: Negative.    Psychiatric/Behavioral: Negative.     Allergic/Immunologic: Negative.      Objective:     Vital Signs (Most Recent):  Temp: 98.4 °F (36.9 °C) (02/06/24 1511)  Pulse: 78 (02/06/24 1517)  Resp: 20 (02/06/24 1511)  BP: 123/77 (02/06/24 1511)  SpO2: (!) 94 % (02/06/24 1511) Vital Signs (24h Range):  Temp:  [98.3 °F (36.8 °C)-98.5 °F (36.9 °C)] 98.4 °F (36.9 °C)  Pulse:  [63-86] 78  Resp:  [12-30] 20  SpO2:  [90 %-97  %] 94 %  BP: ()/(65-88) 123/77     Weight: 85.6 kg (188 lb 11.4 oz)  Body mass index is 27.87 kg/m².    SpO2: (!) 94 %         Intake/Output Summary (Last 24 hours) at 2/6/2024 1550  Last data filed at 2/6/2024 1307  Gross per 24 hour   Intake 249.9 ml   Output --   Net 249.9 ml       Lines/Drains/Airways       Peripheral Intravenous Line  Duration                  Peripheral IV - Single Lumen 02/05/24 1809 20 G Right Antecubital <1 day         Peripheral IV - Single Lumen 02/05/24 2001 20 G Anterior;Left Forearm <1 day                     Physical Exam  Vitals reviewed.   Constitutional:       Appearance: He is well-developed.   HENT:      Head: Normocephalic.   Eyes:      Conjunctiva/sclera: Conjunctivae normal.      Pupils: Pupils are equal, round, and reactive to light.   Cardiovascular:      Rate and Rhythm: Normal rate and regular rhythm.      Heart sounds: Normal heart sounds.   Pulmonary:      Effort: Pulmonary effort is normal.      Breath sounds: Normal breath sounds.   Abdominal:      General: Bowel sounds are normal.      Palpations: Abdomen is soft.   Musculoskeletal:      Cervical back: Normal range of motion and neck supple.   Skin:     General: Skin is warm.   Neurological:      Mental Status: He is alert and oriented to person, place, and time.          Significant Labs: BMP:   Recent Labs   Lab 02/05/24 1809 02/06/24  0550    135*    140   K 3.7 4.1    109   CO2 23 22*   BUN 9 11   CREATININE 1.4 1.3   CALCIUM 9.5 9.0   MG  --  1.9   , CMP   Recent Labs   Lab 02/05/24 1809 02/06/24  0550    140   K 3.7 4.1    109   CO2 23 22*    135*   BUN 9 11   CREATININE 1.4 1.3   CALCIUM 9.5 9.0   PROT 7.9 7.0   ALBUMIN 4.0 3.5   BILITOT 0.6 0.7   ALKPHOS 65 59   AST 25 19   ALT 29 24   ANIONGAP 11 9   , CBC   Recent Labs   Lab 02/05/24 1809 02/05/24 1956 02/06/24  0550   WBC 5.60 5.11 5.38   HGB 14.0 13.4* 13.2*   HCT 42.4 40.6 39.7*   * 112* 114*   , INR  "  Recent Labs   Lab 02/05/24  1956   INR 1.0   , Lipid Panel No results for input(s): "CHOL", "HDL", "LDLCALC", "TRIG", "CHOLHDL" in the last 48 hours., Troponin   Recent Labs   Lab 02/05/24  1809 02/05/24 2041   TROPONINI <0.006 0.006   , and All pertinent lab results from the last 24 hours have been reviewed.    Significant Imaging: Echocardiogram: Transthoracic echo (TTE) complete (Cupid Only):   Results for orders placed or performed during the hospital encounter of 02/05/24   Echo   Result Value Ref Range    BSA 2.04 m2    LVOT stroke volume 63.35 cm3    LVIDd 3.99 3.5 - 6.0 cm    LV Systolic Volume 18.45 mL    LV Systolic Volume Index 9.1 mL/m2    LVIDs 2.32 2.1 - 4.0 cm    LV Diastolic Volume 69.62 mL    LV Diastolic Volume Index 34.47 mL/m2    IVS 0.83 0.6 - 1.1 cm    LVOT diameter 2.26 cm    LVOT area 4.0 cm2    FS 42 28 - 44 %    Left Ventricle Relative Wall Thickness 0.42 cm    Posterior Wall 0.83 0.6 - 1.1 cm    LV mass 97.81 g    LV Mass Index 48 g/m2    MV Peak E Magdaleno 0.59 m/s    TDI LATERAL 0.07 m/s    TDI SEPTAL 0.07 m/s    E/E' ratio 8.43 m/s    MV Peak A Magdaleno 0.54 m/s    TR Max Magdaleno 2.29 m/s    E/A ratio 1.09     IVRT 122.74 msec    E wave deceleration time 244.26 msec    LV SEPTAL E/E' RATIO 8.43 m/s    LV LATERAL E/E' RATIO 8.43 m/s    PV Peak S Magdaleno 0.46 m/s    PV Peak D Magdaleno 0.33 m/s    Pulm vein S/D ratio 1.39     LVOT peak magdaleno 0.66 m/s    Left Ventricular Outflow Tract Mean Velocity 0.50 cm/s    Left Ventricular Outflow Tract Mean Gradient 1.12 mmHg    RVDD 3.53 cm    TAPSE 2.01 cm    LA size 3.20 cm    Left Atrium Minor Axis 3.59 cm    RA Major Axis 5.22 cm    RA Width 4.01 cm    AV mean gradient 3 mmHg    AV peak gradient 5 mmHg    Ao peak magdaleno 1.10 m/s    Ao VTI 22.40 cm    LVOT peak VTI 15.80 cm    AV valve area 2.83 cm²    AV Velocity Ratio 0.60     AV index (prosthetic) 0.71     SELENA by Velocity Ratio 2.41 cm²    MV stenosis pressure 1/2 time 70.83 ms    MV valve area p 1/2 method 3.11 cm2    " TV peak gradient 1 mmHg    Triscuspid Valve Regurgitation Peak Gradient 21 mmHg    PV PEAK VELOCITY 0.70 m/s    PV peak gradient 2 mmHg    Sinus 3.86 cm    STJ 2.95 cm    Ascending aorta 3.13 cm    IVC diameter 1.27 cm    Mean e' 0.07 m/s    ZLVIDS -3.58     ZLVIDD -4.00     LA Volume Index 20.4 mL/m2    LA volume 41.26 cm3    Left Atrium Major Axis 5.1 cm    LA WIDTH 3.6 cm    TV resting pulmonary artery pressure 24 mmHg    RV TB RVSP 5 mmHg    Est. RA pres 3 mmHg    Narrative      Left Ventricle: The left ventricle is normal in size. Normal wall   thickness. Normal wall motion. There is normal systolic function with a   visually estimated ejection fraction of 60 - 65%. Grade I diastolic   dysfunction.    Right Ventricle: Normal right ventricular cavity size. Systolic   function is normal.    Aorta: Aortic root is mildly dilated measuring 3.86 cm.    Pulmonary Artery: The estimated pulmonary artery systolic pressure is   24 mmHg.

## 2024-02-06 NOTE — NURSING
Home Oxygen Evaluation    Date Performed: 2024    1) Patient's Home O2 Sat on room air, while at rest: 88-90        If O2 sats on room air at rest are 88% or below, patient qualifies. No additional testing needed. Document N/A in steps 2 and 3. If 89% or above, complete steps 2.      2) Patient's O2 Sat on room air while exercisin        If O2 sats on room air while exercising remain 89% or above patient does not qualify, no further testing needed Document N/A in step 3. If O2 sats on room air while exercising are 88% or below, continue to step 3.      3) Patient's O2 Sat while exercising on O2: 90 at 1 LPM         (Must show improvement from #2 for patients to qualify)    If O2 sats improve on oxygen, patient qualifies for portable oxygen. If not, the patient does not qualify.

## 2024-02-06 NOTE — ASSESSMENT & PLAN NOTE
Patient has been seeing a steady rise over past 4-5 years.  Repeat PSA ordered  This can be followed up in clinic by his PCP

## 2024-02-06 NOTE — SUBJECTIVE & OBJECTIVE
Past Medical History:   Diagnosis Date    DDD (degenerative disc disease), lumbar 4/14/2023    Pulmonary embolism     Stroke        Past Surgical History:   Procedure Laterality Date    COLONOSCOPY N/A 4/23/2021    Procedure: COLONOSCOPY;  Surgeon: Ranjith Garcia MD;  Location: [facility] ([address] [place] [address]);  Service: Endoscopy;  Laterality: N/A;  positive covid 2/12/21-BB    SURGICAL REMOVAL OF LYMPH NODE  1989    right arm       Review of patient's allergies indicates:   Allergen Reactions    Pcn [penicillins] Hives       No current facility-administered medications on file prior to encounter.     Current Outpatient Medications on File Prior to Encounter   Medication Sig    [DISCONTINUED] aspirin (ECOTRIN) 81 MG EC tablet Take 81 mg by mouth once daily.    [DISCONTINUED] cetirizine (ZYRTEC) 10 MG tablet Take 10 mg by mouth.    [DISCONTINUED] meloxicam (MOBIC) 15 MG tablet Take 1 tablet (15 mg total) by mouth once daily. (Patient not taking: Reported on 5/1/2023)    [DISCONTINUED] methocarbamoL (ROBAXIN) 500 MG Tab Take 1 tablet (500 mg total) by mouth 3 (three) times daily as needed (muscle spasm). (Patient not taking: Reported on 4/14/2023)    [DISCONTINUED] promethazine-dextromethorphan (PROMETHAZINE-DM) 6.25-15 mg/5 mL Syrp Take 5 mLs by mouth 2 (two) times daily as needed.     Family History       Problem Relation (Age of Onset)    Heart disease Father          Tobacco Use    Smoking status: Never    Smokeless tobacco: Never   Substance and Sexual Activity    Alcohol use: Yes     Alcohol/week: 1.0 standard drink of alcohol     Types: 1 Cans of beer per week    Drug use: Never    Sexual activity: Not on file     Review of Systems   Constitutional:  Positive for activity change. Negative for fever and unexpected weight change.   HENT:  Negative for trouble swallowing and voice change.    Eyes:  Negative for photophobia and visual disturbance.   Respiratory:  Positive for chest tightness and shortness of breath. Negative  for cough.    Cardiovascular:  Positive for chest pain. Negative for palpitations and leg swelling.   Gastrointestinal:  Negative for abdominal distention, abdominal pain, blood in stool, constipation, diarrhea, nausea and vomiting.   Endocrine: Negative for polydipsia and polyuria.   Genitourinary:  Negative for difficulty urinating, dysuria and hematuria.   Musculoskeletal:  Positive for myalgias. Negative for gait problem and joint swelling.   Skin:  Negative for pallor and rash.   Allergic/Immunologic: Negative for immunocompromised state.   Neurological:  Negative for seizures, syncope, facial asymmetry and weakness.   Psychiatric/Behavioral:  Negative for agitation, behavioral problems and confusion.      Objective:     Vital Signs (Most Recent):  Temp: 98.4 °F (36.9 °C) (02/05/24 1745)  Pulse: 75 (02/05/24 2210)  Resp: 20 (02/05/24 2210)  BP: 133/80 (02/05/24 2202)  SpO2: 95 % (02/05/24 2210) Vital Signs (24h Range):  Temp:  [98.4 °F (36.9 °C)] 98.4 °F (36.9 °C)  Pulse:  [72-86] 75  Resp:  [15-22] 20  SpO2:  [91 %-97 %] 95 %  BP: (133-137)/(75-88) 133/80     Weight: 89.4 kg (197 lb)  Body mass index is 28.27 kg/m².     Physical Exam  Vitals and nursing note reviewed.   Constitutional:       General: He is not in acute distress.     Appearance: He is well-developed and normal weight. He is not ill-appearing or diaphoretic.   HENT:      Head: Normocephalic and atraumatic.   Eyes:      General: No scleral icterus.     Pupils: Pupils are equal, round, and reactive to light.   Neck:      Thyroid: No thyromegaly.   Cardiovascular:      Rate and Rhythm: Normal rate and regular rhythm.      Heart sounds: No murmur heard.  Pulmonary:      Effort: Respiratory distress present.      Breath sounds: No stridor. Rales present. No wheezing.   Abdominal:      General: There is no distension.      Palpations: Abdomen is soft.      Tenderness: There is no guarding.   Musculoskeletal:         General: No deformity. Normal range  of motion.      Cervical back: Normal range of motion.      Right lower leg: No edema.      Left lower leg: No edema.   Skin:     General: Skin is warm.      Coloration: Skin is not jaundiced.      Findings: No bruising.   Neurological:      Mental Status: He is alert and oriented to person, place, and time.      Cranial Nerves: No cranial nerve deficit.      Motor: No weakness.   Psychiatric:         Mood and Affect: Mood normal.         Behavior: Behavior normal.         Thought Content: Thought content normal.         Judgment: Judgment normal.              CRANIAL NERVES     CN III, IV, VI   Pupils are equal, round, and reactive to light.           Recent Results (from the past 24 hour(s))   CBC auto differential    Collection Time: 02/05/24  6:09 PM   Result Value Ref Range    WBC 5.60 3.90 - 12.70 K/uL    RBC 4.93 4.60 - 6.20 M/uL    Hemoglobin 14.0 14.0 - 18.0 g/dL    Hematocrit 42.4 40.0 - 54.0 %    MCV 86 82 - 98 fL    MCH 28.4 27.0 - 31.0 pg    MCHC 33.0 32.0 - 36.0 g/dL    RDW 13.2 11.5 - 14.5 %    Platelets 126 (L) 150 - 450 K/uL    MPV 10.4 9.2 - 12.9 fL    Immature Granulocytes 0.2 0.0 - 0.5 %    Gran # (ANC) 3.0 1.8 - 7.7 K/uL    Immature Grans (Abs) 0.01 0.00 - 0.04 K/uL    Lymph # 1.6 1.0 - 4.8 K/uL    Mono # 0.9 0.3 - 1.0 K/uL    Eos # 0.1 0.0 - 0.5 K/uL    Baso # 0.04 0.00 - 0.20 K/uL    nRBC 0 0 /100 WBC    Gran % 53.2 38.0 - 73.0 %    Lymph % 28.4 18.0 - 48.0 %    Mono % 15.5 (H) 4.0 - 15.0 %    Eosinophil % 2.0 0.0 - 8.0 %    Basophil % 0.7 0.0 - 1.9 %    Differential Method Automated    Comprehensive metabolic panel    Collection Time: 02/05/24  6:09 PM   Result Value Ref Range    Sodium 140 136 - 145 mmol/L    Potassium 3.7 3.5 - 5.1 mmol/L    Chloride 106 95 - 110 mmol/L    CO2 23 23 - 29 mmol/L    Glucose 104 70 - 110 mg/dL    BUN 9 8 - 23 mg/dL    Creatinine 1.4 0.5 - 1.4 mg/dL    Calcium 9.5 8.7 - 10.5 mg/dL    Total Protein 7.9 6.0 - 8.4 g/dL    Albumin 4.0 3.5 - 5.2 g/dL    Total  Bilirubin 0.6 0.1 - 1.0 mg/dL    Alkaline Phosphatase 65 55 - 135 U/L    AST 25 10 - 40 U/L    ALT 29 10 - 44 U/L    eGFR 57 (A) >60 mL/min/1.73 m^2    Anion Gap 11 8 - 16 mmol/L   Troponin I #1    Collection Time: 02/05/24  6:09 PM   Result Value Ref Range    Troponin I <0.006 0.000 - 0.026 ng/mL   B-Type natriuretic peptide (BNP)    Collection Time: 02/05/24  6:09 PM   Result Value Ref Range    BNP <10 0 - 99 pg/mL   D dimer, quantitative    Collection Time: 02/05/24  6:09 PM   Result Value Ref Range    D-Dimer 22.38 (H) <0.50 mg/L FEU   APTT    Collection Time: 02/05/24  7:56 PM   Result Value Ref Range    aPTT 28.2 21.0 - 32.0 sec   Protime-INR    Collection Time: 02/05/24  7:56 PM   Result Value Ref Range    Prothrombin Time 11.3 9.0 - 12.5 sec    INR 1.0 0.8 - 1.2   CBC auto differential    Collection Time: 02/05/24  7:56 PM   Result Value Ref Range    WBC 5.11 3.90 - 12.70 K/uL    RBC 4.73 4.60 - 6.20 M/uL    Hemoglobin 13.4 (L) 14.0 - 18.0 g/dL    Hematocrit 40.6 40.0 - 54.0 %    MCV 86 82 - 98 fL    MCH 28.3 27.0 - 31.0 pg    MCHC 33.0 32.0 - 36.0 g/dL    RDW 13.2 11.5 - 14.5 %    Platelets 112 (L) 150 - 450 K/uL    MPV 9.8 9.2 - 12.9 fL    Immature Granulocytes 0.4 0.0 - 0.5 %    Gran # (ANC) 2.6 1.8 - 7.7 K/uL    Immature Grans (Abs) 0.02 0.00 - 0.04 K/uL    Lymph # 1.5 1.0 - 4.8 K/uL    Mono # 0.9 0.3 - 1.0 K/uL    Eos # 0.1 0.0 - 0.5 K/uL    Baso # 0.04 0.00 - 0.20 K/uL    nRBC 0 0 /100 WBC    Gran % 49.9 38.0 - 73.0 %    Lymph % 29.7 18.0 - 48.0 %    Mono % 17.0 (H) 4.0 - 15.0 %    Eosinophil % 2.2 0.0 - 8.0 %    Basophil % 0.8 0.0 - 1.9 %    Differential Method Automated    CEA    Collection Time: 02/05/24  8:39 PM   Result Value Ref Range    CEA 2.5 0.0 - 5.0 ng/mL   Troponin I #2    Collection Time: 02/05/24  8:41 PM   Result Value Ref Range    Troponin I 0.006 0.000 - 0.026 ng/mL       Microbiology Results (last 7 days)       ** No results found for the last 168 hours. **             Imaging  Results              US Lower Extremity Veins Bilateral (In process)                      CTA Chest Non-Coronary (PE Studies) (Edited Result - FINAL)  Result time 02/05/24 21:20:47      Addendum (preliminary) 1 of 1 by Joan Cheatham MD (02/05/24 21:20:47)      The first sentence of the findings should read: There are multiple right-sided pulmonary emboli.      Electronically signed by: Joan Cheatham  Date:    02/05/2024  Time:    21:20                 Final result by Joan Cheatham MD (02/05/24 19:39:32)                   Impression:      Multiple right-sided pulmonary emboli.    8 x 7 mm nodular density abutting the right middle lobe.  Findings may represent a perifissural lymph node, part of the normal pleural reflection or a pulmonary nodule.  This was seen on CT abdomen pelvis with contrast on 01/01/2017.    Bronchiectatic changes.  Ground-glass opacities in the lower lung fields, which may be due to atelectatic changes.  An infectious process may have a similar appearance.    Findings were called to Dr. Ji at 19:37 on 02/05/2024.    This report was flagged in [employer] as abnormal.      Electronically signed by: Joan Cheatham  Date:    02/05/2024  Time:    19:39               Narrative:    EXAMINATION:  CT PULMONARY ANGIOGRAM WITH CONTRAST    CLINICAL HISTORY:  Shortness of breath.    TECHNIQUE:  CT of the chest with intravenous contrast for pulmonary artery angiogram was performed. Contiguous axial 1.25 mm images followed by 10 mm reconstructions with multiplanar and MIP reformations of the pulmonary arteries. No 3D post-angiographic imaging was performed on an independent workstation and reviewed.  75 ml of Omnipaque 350 was injected.    COMPARISON:  CT abdomen pelvis 01/01/2017    FINDINGS:  There is no evidence of pulmonary artery filling defect to suggest pulmonary embolism. There is no aortic aneurysm or aortic dissection.    There is a an 8 x 7 mm nodular density in the right middle lobe  abutting the minor fissure (series 3 axial image 262).  On the prior CT abdomen pelvis from 01/01/2017, a 7 mm right middle lobe pulmonary nodule was reported.  Bronchiectatic changes are seen at the lung bases.  Ground-glass opacities are seen at the lung bases and in the right middle lobe and lingula.    There is no evidence of mediastinal, hilar, or axillary adenopathy.    There is no pleural or pericardial effusion.    The heart size is within normal limits.  Right ventricular strain is suspected.    In the visualized upper abdomen, calcified granulomas are seen in the liver.                                       X-Ray Chest AP Portable (Final result)  Result time 02/05/24 18:49:38      Final result by Joan Cheatham MD (02/05/24 18:49:38)                   Impression:      Increased interstitial lung markings.  Findings may be related to interstitial edema or interstitial infiltrates.      Electronically signed by: Joan Cheatham  Date:    02/05/2024  Time:    18:49               Narrative:    EXAMINATION:  AP PORTABLE CHEST    CLINICAL HISTORY:  Chest Pain;    TECHNIQUE:  AP portable chest radiograph was submitted.    COMPARISON:  01/01/2017    FINDINGS:  AP portable chest radiograph demonstrates a cardiac silhouette within normal limits.  There are increased interstitial nonspecific lung markings.  There is no pneumothorax or pleural effusion.  There is decreased inspiratory effort when compared to the study 01/01/2017.

## 2024-02-06 NOTE — ASSESSMENT & PLAN NOTE
CT scan personally reviewed.  BNP and troponin are normal.  RV to LV ratio is less than 0.9.  V/Q scan shows small bilateral perfusion defects.   No evidence of RV strain on echocardiogram.However patient is symptomatic , states gets short of breath  if he walks or  Talks in sentences.  Feels that his breathing is slightly better as compared to yesterday, but not back to baseline.  Continue heparin drip.  Will follow.

## 2024-02-06 NOTE — CONSULTS
[facility] -  intensive care  Cardiology  Consult Note    Patient Name: Suraj Constantino  MRN: [id number]  Admission Date: 2/5/2024  Hospital Length of Stay: 1 days  Code Status: Full Code   Attending Provider: Zenaida Cameron MD   Consulting Provider: Mayela Romero MD  Primary Care Physician: Corinne Ramirez MD  Principal Problem:Acute pulmonary embolism    Patient information was obtained from patient and ER records.     Inpatient consult to Cardiology  Consult performed by: Mayela Romero MD  Consult ordered by: Zenaida Cameron MD        Subjective:     Chief Complaint:  sob     HPI:   Patient seen and examined in ICU.  Came in with pulmonary embolism.  Dyspnea on exertion and shortness of breath which started yesterday.  Does have past medical history of pulmonary embolism.  Currently not on anticoagulation at home.  It was stopped after a few months.  CT scan personally reviewed and discussed with pulmonology.  Multiple areas of thrombus.  Patient states he gets short of breath if he walks.      Results for orders placed or performed during the hospital encounter of 02/05/24 (from the past 2160 hour(s))   CTA Chest Non-Coronary (PE Studies)    Narrative    EXAMINATION:  CT PULMONARY ANGIOGRAM WITH CONTRAST    CLINICAL HISTORY:  Shortness of breath.    TECHNIQUE:  CT of the chest with intravenous contrast for pulmonary artery angiogram was performed. Contiguous axial 1.25 mm images followed by 10 mm reconstructions with multiplanar and MIP reformations of the pulmonary arteries. No 3D post-angiographic imaging was performed on an independent workstation and reviewed.  75 ml of Omnipaque 350 was injected.    COMPARISON:  CT abdomen pelvis 01/01/2017    FINDINGS:  There is no evidence of pulmonary artery filling defect to suggest pulmonary embolism. There is no aortic aneurysm or aortic dissection.    There is a an 8 x 7 mm nodular density in the right middle lobe abutting the minor fissure (series 3 axial  image 262).  On the prior CT abdomen pelvis from 01/01/2017, a 7 mm right middle lobe pulmonary nodule was reported.  Bronchiectatic changes are seen at the lung bases.  Ground-glass opacities are seen at the lung bases and in the right middle lobe and lingula.    There is no evidence of mediastinal, hilar, or axillary adenopathy.    There is no pleural or pericardial effusion.    The heart size is within normal limits.  Right ventricular strain is suspected.    In the visualized upper abdomen, calcified granulomas are seen in the liver.      Impression    Multiple right-sided pulmonary emboli.    8 x 7 mm nodular density abutting the right middle lobe.  Findings may represent a perifissural lymph node, part of the normal pleural reflection or a pulmonary nodule.  This was seen on CT abdomen pelvis with contrast on 01/01/2017.    Bronchiectatic changes.  Ground-glass opacities in the lower lung fields, which may be due to atelectatic changes.  An infectious process may have a similar appearance.    Findings were called to Dr. Ji at 19:37 on 02/05/2024.    This report was flagged in Epic as abnormal.      Electronically signed by: Joan Cheatham  Date:    02/05/2024  Time:    19:39       V/Q scan:    Perfusion: Few small bilateral wedge-shaped perfusion defects.     Ventilation: Normal inhalation, equilibrium, and washout phases.  No focal ventilation defect or evidence of air trapping.      Impression:     No evidence of deep venous thrombosis in either lower extremity.      HPI:     Suraj Constantino is a 63 y.o. male who has a past medical history of Acute pulmonary emboli, DDD, lumbar, Elevated PSA, Pulmonary embolism, and Stroke, presented to the ED with CC of R chest pain.      Patient was feeling SOBOE as well as R chest pain. Not reproducible with palpations or deep breathing. He feels like someone punched him in the chest. He is mildly hypoxic on arrival with D-dimer 22, CTA showed multiple emboli on R  "lung. CTA showed evidence of RH strain. He has not chronic lung diseases and fairly normal ECHO/stress a few years ago. Patient denies being sedentary and works a lot. He has had another clot back 6 years ago, he was on Apix for 3 months. At one point, there was a doctor concerned he had lupus back in 1989. They also thought he had cancer because he had multiple large lymph nodes swollen then and night sweats "drenching the bed." But biopsy was negative and it eventually went away.. Patients PSA's have all been normal and low, however this last one was high at 5 (he seemed unaware of this). Repeat ordered. Colonoscopy did not find much 3 years or so ago, but was due back in 5 years bc of semi-poor prep. Started on Hep gtt; and given ?RH strain on CTA, is being watched in ICU.      Overview/Hospital Course:  Mr Suraj Constantino is a 63 y.o. man who was admitted with multiple R sided pulmonary emboli. He was started on heparin gtt and admitted to ICU with concerns for R heart strain. TTE completed and shows normal R heart function. Cardiology Dr Romero consulted.       Past Medical History:   Diagnosis Date    Acute pulmonary emboli 01/01/2017    DDD (degenerative disc disease), lumbar 04/14/2023    Elevated PSA 02/05/2024    Pulmonary embolism     Stroke        Past Surgical History:   Procedure Laterality Date    COLONOSCOPY N/A 4/23/2021    Procedure: COLONOSCOPY;  Surgeon: Ranjith Garcia MD;  Location: [address]);  Service: Endoscopy;  Laterality: N/A;  positive covid 2/12/21-[facility]    SURGICAL REMOVAL OF LYMPH NODE  1989    right arm       Review of patient's allergies indicates:   Allergen Reactions    Pcn [penicillins] Hives       No current facility-administered medications on file prior to encounter.     No current outpatient medications on file prior to encounter.     Family History       Problem Relation (Age of Onset)    Heart disease Father          Tobacco Use    Smoking status: Never    Smokeless " tobacco: Never   Substance and Sexual Activity    Alcohol use: Yes     Alcohol/week: 1.0 standard drink of alcohol     Types: 1 Cans of beer per week    Drug use: Never    Sexual activity: Not on file     Review of Systems   Constitutional: Negative.   HENT: Negative.     Eyes: Negative.    Cardiovascular:  Positive for chest pain and dyspnea on exertion.          Pleuritic right-sided chest pain on deep inhalation   Respiratory:  Positive for shortness of breath.    Endocrine: Negative.    Hematologic/Lymphatic: Negative.    Skin: Negative.    Musculoskeletal: Negative.    Gastrointestinal: Negative.    Genitourinary: Negative.    Neurological: Negative.    Psychiatric/Behavioral: Negative.     Allergic/Immunologic: Negative.      Objective:     Vital Signs (Most Recent):  Temp: 98.4 °F (36.9 °C) (02/06/24 1511)  Pulse: 78 (02/06/24 1517)  Resp: 20 (02/06/24 1511)  BP: 123/77 (02/06/24 1511)  SpO2: (!) 94 % (02/06/24 1511) Vital Signs (24h Range):  Temp:  [98.3 °F (36.8 °C)-98.5 °F (36.9 °C)] 98.4 °F (36.9 °C)  Pulse:  [63-86] 78  Resp:  [12-30] 20  SpO2:  [90 %-97 %] 94 %  BP: ()/(65-88) 123/77     Weight: 85.6 kg (188 lb 11.4 oz)  Body mass index is 27.87 kg/m².    SpO2: (!) 94 %         Intake/Output Summary (Last 24 hours) at 2/6/2024 1550  Last data filed at 2/6/2024 1307  Gross per 24 hour   Intake 249.9 ml   Output --   Net 249.9 ml       Lines/Drains/Airways       Peripheral Intravenous Line  Duration                  Peripheral IV - Single Lumen 02/05/24 1809 20 G Right Antecubital <1 day         Peripheral IV - Single Lumen 02/05/24 2001 20 G Anterior;Left Forearm <1 day                     Physical Exam  Vitals reviewed.   Constitutional:       Appearance: He is well-developed.   HENT:      Head: Normocephalic.   Eyes:      Conjunctiva/sclera: Conjunctivae normal.      Pupils: Pupils are equal, round, and reactive to light.   Cardiovascular:      Rate and Rhythm: Normal rate and regular rhythm.      " Heart sounds: Normal heart sounds.   Pulmonary:      Effort: Pulmonary effort is normal.      Breath sounds: Normal breath sounds.   Abdominal:      General: Bowel sounds are normal.      Palpations: Abdomen is soft.   Musculoskeletal:      Cervical back: Normal range of motion and neck supple.   Skin:     General: Skin is warm.   Neurological:      Mental Status: He is alert and oriented to person, place, and time.          Significant Labs: BMP:   Recent Labs   Lab 02/05/24 1809 02/06/24  0550    135*    140   K 3.7 4.1    109   CO2 23 22*   BUN 9 11   CREATININE 1.4 1.3   CALCIUM 9.5 9.0   MG  --  1.9   , CMP   Recent Labs   Lab 02/05/24 1809 02/06/24  0550    140   K 3.7 4.1    109   CO2 23 22*    135*   BUN 9 11   CREATININE 1.4 1.3   CALCIUM 9.5 9.0   PROT 7.9 7.0   ALBUMIN 4.0 3.5   BILITOT 0.6 0.7   ALKPHOS 65 59   AST 25 19   ALT 29 24   ANIONGAP 11 9   , CBC   Recent Labs   Lab 02/05/24 1809 02/05/24 1956 02/06/24  0550   WBC 5.60 5.11 5.38   HGB 14.0 13.4* 13.2*   HCT 42.4 40.6 39.7*   * 112* 114*   , INR   Recent Labs   Lab 02/05/24 1956   INR 1.0   , Lipid Panel No results for input(s): "CHOL", "HDL", "LDLCALC", "TRIG", "CHOLHDL" in the last 48 hours., Troponin   Recent Labs   Lab 02/05/24 1809 02/05/24 2041   TROPONINI <0.006 0.006   , and All pertinent lab results from the last 24 hours have been reviewed.    Significant Imaging: Echocardiogram: Transthoracic echo (TTE) complete ([facility] Only):   Results for orders placed or performed during the hospital encounter of 02/05/24   Echo   Result Value Ref Range    BSA 2.04 m2    LVOT stroke volume 63.35 cm3    LVIDd 3.99 3.5 - 6.0 cm    LV Systolic Volume 18.45 mL    LV Systolic Volume Index 9.1 mL/m2    LVIDs 2.32 2.1 - 4.0 cm    LV Diastolic Volume 69.62 mL    LV Diastolic Volume Index 34.47 mL/m2    IVS 0.83 0.6 - 1.1 cm    LVOT diameter 2.26 cm    LVOT area 4.0 cm2    FS 42 28 - 44 %    Left Ventricle " Relative Wall Thickness 0.42 cm    Posterior Wall 0.83 0.6 - 1.1 cm    LV mass 97.81 g    LV Mass Index 48 g/m2    MV Peak E Magdaleno 0.59 m/s    TDI LATERAL 0.07 m/s    TDI SEPTAL 0.07 m/s    E/E' ratio 8.43 m/s    MV Peak A Magdaleno 0.54 m/s    TR Max Magdaleno 2.29 m/s    E/A ratio 1.09     IVRT 122.74 msec    E wave deceleration time 244.26 msec    LV SEPTAL E/E' RATIO 8.43 m/s    LV LATERAL E/E' RATIO 8.43 m/s    PV Peak S Magdaleno 0.46 m/s    PV Peak D Magdaleno 0.33 m/s    Pulm vein S/D ratio 1.39     LVOT peak magdaleno 0.66 m/s    Left Ventricular Outflow Tract Mean Velocity 0.50 cm/s    Left Ventricular Outflow Tract Mean Gradient 1.12 mmHg    RVDD 3.53 cm    TAPSE 2.01 cm    LA size 3.20 cm    Left Atrium Minor Axis 3.59 cm    RA Major Axis 5.22 cm    RA Width 4.01 cm    AV mean gradient 3 mmHg    AV peak gradient 5 mmHg    Ao peak magdaleno 1.10 m/s    Ao VTI 22.40 cm    LVOT peak VTI 15.80 cm    AV valve area 2.83 cm²    AV Velocity Ratio 0.60     AV index (prosthetic) 0.71     SELENA by Velocity Ratio 2.41 cm²    MV stenosis pressure 1/2 time 70.83 ms    MV valve area p 1/2 method 3.11 cm2    TV peak gradient 1 mmHg    Triscuspid Valve Regurgitation Peak Gradient 21 mmHg    PV PEAK VELOCITY 0.70 m/s    PV peak gradient 2 mmHg    Sinus 3.86 cm    STJ 2.95 cm    Ascending aorta 3.13 cm    IVC diameter 1.27 cm    Mean e' 0.07 m/s    ZLVIDS -3.58     ZLVIDD -4.00     LA Volume Index 20.4 mL/m2    LA volume 41.26 cm3    Left Atrium Major Axis 5.1 cm    LA WIDTH 3.6 cm    TV resting pulmonary artery pressure 24 mmHg    RV TB RVSP 5 mmHg    Est. RA pres 3 mmHg    Narrative      Left Ventricle: The left ventricle is normal in size. Normal wall   thickness. Normal wall motion. There is normal systolic function with a   visually estimated ejection fraction of 60 - 65%. Grade I diastolic   dysfunction.    Right Ventricle: Normal right ventricular cavity size. Systolic   function is normal.    Aorta: Aortic root is mildly dilated measuring 3.86 cm.     Pulmonary Artery: The estimated pulmonary artery systolic pressure is   24 mmHg.       Assessment and Plan:     * Acute pulmonary emboli   CT scan personally reviewed.  BNP and troponin are normal.  RV to LV ratio is less than 0.9.  V/Q scan shows small bilateral perfusion defects.   No evidence of RV strain on echocardiogram.However patient is symptomatic , states gets short of breath  if he walks or  Talks in sentences.  Feels that his breathing is slightly better as compared to yesterday, but not back to baseline.  Continue heparin drip.  Will follow.    Right-sided chest pain    Pleuritic chest pain.  Caused by pulmonary embolus        VTE Risk Mitigation (From admission, onward)           Ordered     heparin 25,000 units in dextrose 5% (100 units/ml) IV bolus from bag HIGH INTENSITY nomogram - [facility]  As needed (PRN)        Question:  Heparin Infusion Adjustment (DO NOT MODIFY ANSWER)  Answer:  \\[email or web address]\epic\Images\Pharmacy\HeparinInfusions\heparin HIGH INTENSITY nomogram for [facility] [id number]    02/05/24 1937     heparin 25,000 units in dextrose 5% (100 units/ml) IV bolus from bag HIGH INTENSITY nomogram - [facility]  As needed (PRN)        Question:  Heparin Infusion Adjustment (DO NOT MODIFY ANSWER)  Answer:  \\[email or web address]\epic\Images\Pharmacy\HeparinInfusions\heparin HIGH INTENSITY nomogram for [facility] [id number]    02/05/24 1937     IP VTE HIGH RISK PATIENT  Once         02/05/24 2026     Place sequential compression device  Until discontinued         02/05/24 2026     heparin 25,000 units in dextrose 5% 250 mL (100 units/mL) infusion HIGH INTENSITY nomogram - [facility]  Continuous        Question:  Begin at (units/kg/hr)  Answer:  18    02/05/24 1937                    Thank you for your consult. I will follow-up with patient. Please contact us if you have any additional questions.    Mayela Romero MD  Cardiology   [facility] [place]    Critical Care Time:  45 minutes     Critical care was time spent personally  by me on the following activities: development of treatment plan with patient or surrogate and bedside caregivers, discussions with consultants, evaluation of patient's response to treatment, examination of patient, ordering and performing treatments and interventions, ordering and review of laboratory studies, ordering and review of radiographic studies, pulse oximetry, re-evaluation of patient's condition. This critical care time did not overlap with that of any other provider or involve time for any procedures.

## 2024-02-06 NOTE — H&P
"  [facility] Medicine  History & Physical    Patient Name: Suraj Constantino  MRN: [id number]  Patient Class: IP- Inpatient  Admission Date: 2/5/2024  Attending Physician: Kareem Felix, *   Primary Care Provider: Corinne Ramirez MD         Patient information was obtained from patient, parent, past medical records, and ER records.     Subjective:     Principal Problem:Acute pulmonary embolism    Chief Complaint:   Chief Complaint   Patient presents with    Chest Pain     Pt reports to the ED with C/O mid sternal CP that radiates to the right chest that started last night. Pt also reports SOB with exertion. Pt denies any N/V with the pain. Pt reports "I feel like someone is punching me in my chest." Pt denies any long car rides or trips. Pt sating 90% on RA. Pt placed in DODIE bed for eval.         HPI:   Suraj Constantino is a 63 y.o. male who has a past medical history of Acute pulmonary emboli, DDD, lumbar, Elevated PSA, Pulmonary embolism, and Stroke, presented to the ED with CC of R chest pain.     Patient was feeling SOBOE as well as R chest pain. Not reproducible with palpations or deep breathing. He feels like someone punched him in the chest. He is mildly hypoxic on arrival with D-dimer 22, CTA showed multiple emboli on R lung. CTA showed evidence of RH strain. He has not chronic lung diseases and fairly normal ECHO/stress a few years ago. Patient denies being sedentary and works a lot. He has had another clot back 6 years ago, he was on Apix for 3 months. At one point, there was a doctor concerned he had lupus back in 1989. They also thought he had cancer because he had multiple large lymph nodes swollen then and night sweats "drenching the bed." But biopsy was negative and it eventually went away.. Patients PSA's have all been normal and low, however this last one was high at 5 (he seemed unaware of this). Repeat ordered. Colonoscopy did not find much 3 years or so ago, but was " due back in 5 years bc of semi-poor prep. Started on Hep gtt; and given ?RH strain on CTA, is being watched in ICU.     Past Medical History:   Diagnosis Date    DDD (degenerative disc disease), lumbar 4/14/2023    Pulmonary embolism     Stroke        Past Surgical History:   Procedure Laterality Date    COLONOSCOPY N/A 4/23/2021    Procedure: COLONOSCOPY;  Surgeon: Ranjith Garcia MD;  Location: [facility] ([address] [place] [address]);  Service: Endoscopy;  Laterality: N/A;  positive covid 2/12/21-BB    SURGICAL REMOVAL OF LYMPH NODE  1989    right arm       Review of patient's allergies indicates:   Allergen Reactions    Pcn [penicillins] Hives       No current facility-administered medications on file prior to encounter.     Current Outpatient Medications on File Prior to Encounter   Medication Sig    [DISCONTINUED] aspirin (ECOTRIN) 81 MG EC tablet Take 81 mg by mouth once daily.    [DISCONTINUED] cetirizine (ZYRTEC) 10 MG tablet Take 10 mg by mouth.    [DISCONTINUED] meloxicam (MOBIC) 15 MG tablet Take 1 tablet (15 mg total) by mouth once daily. (Patient not taking: Reported on 5/1/2023)    [DISCONTINUED] methocarbamoL (ROBAXIN) 500 MG Tab Take 1 tablet (500 mg total) by mouth 3 (three) times daily as needed (muscle spasm). (Patient not taking: Reported on 4/14/2023)    [DISCONTINUED] promethazine-dextromethorphan (PROMETHAZINE-DM) 6.25-15 mg/5 mL Syrp Take 5 mLs by mouth 2 (two) times daily as needed.     Family History       Problem Relation (Age of Onset)    Heart disease Father          Tobacco Use    Smoking status: Never    Smokeless tobacco: Never   Substance and Sexual Activity    Alcohol use: Yes     Alcohol/week: 1.0 standard drink of alcohol     Types: 1 Cans of beer per week    Drug use: Never    Sexual activity: Not on file     Review of Systems   Constitutional:  Positive for activity change. Negative for fever and unexpected weight change.   HENT:  Negative for trouble swallowing and voice change.    Eyes:   Negative for photophobia and visual disturbance.   Respiratory:  Positive for chest tightness and shortness of breath. Negative for cough.    Cardiovascular:  Positive for chest pain. Negative for palpitations and leg swelling.   Gastrointestinal:  Negative for abdominal distention, abdominal pain, blood in stool, constipation, diarrhea, nausea and vomiting.   Endocrine: Negative for polydipsia and polyuria.   Genitourinary:  Negative for difficulty urinating, dysuria and hematuria.   Musculoskeletal:  Positive for myalgias. Negative for gait problem and joint swelling.   Skin:  Negative for pallor and rash.   Allergic/Immunologic: Negative for immunocompromised state.   Neurological:  Negative for seizures, syncope, facial asymmetry and weakness.   Psychiatric/Behavioral:  Negative for agitation, behavioral problems and confusion.      Objective:     Vital Signs (Most Recent):  Temp: 98.4 °F (36.9 °C) (02/05/24 1745)  Pulse: 75 (02/05/24 2210)  Resp: 20 (02/05/24 2210)  BP: 133/80 (02/05/24 2202)  SpO2: 95 % (02/05/24 2210) Vital Signs (24h Range):  Temp:  [98.4 °F (36.9 °C)] 98.4 °F (36.9 °C)  Pulse:  [72-86] 75  Resp:  [15-22] 20  SpO2:  [91 %-97 %] 95 %  BP: (133-137)/(75-88) 133/80     Weight: 89.4 kg (197 lb)  Body mass index is 28.27 kg/m².     Physical Exam  Vitals and nursing note reviewed.   Constitutional:       General: He is not in acute distress.     Appearance: He is well-developed and normal weight. He is not ill-appearing or diaphoretic.   HENT:      Head: Normocephalic and atraumatic.   Eyes:      General: No scleral icterus.     Pupils: Pupils are equal, round, and reactive to light.   Neck:      Thyroid: No thyromegaly.   Cardiovascular:      Rate and Rhythm: Normal rate and regular rhythm.      Heart sounds: No murmur heard.  Pulmonary:      Effort: Respiratory distress present.      Breath sounds: No stridor. Rales present. No wheezing.   Abdominal:      General: There is no distension.       Palpations: Abdomen is soft.      Tenderness: There is no guarding.   Musculoskeletal:         General: No deformity. Normal range of motion.      Cervical back: Normal range of motion.      Right lower leg: No edema.      Left lower leg: No edema.   Skin:     General: Skin is warm.      Coloration: Skin is not jaundiced.      Findings: No bruising.   Neurological:      Mental Status: He is alert and oriented to person, place, and time.      Cranial Nerves: No cranial nerve deficit.      Motor: No weakness.   Psychiatric:         Mood and Affect: Mood normal.         Behavior: Behavior normal.         Thought Content: Thought content normal.         Judgment: Judgment normal.              CRANIAL NERVES     CN III, IV, VI   Pupils are equal, round, and reactive to light.           Recent Results (from the past 24 hour(s))   CBC auto differential    Collection Time: 02/05/24  6:09 PM   Result Value Ref Range    WBC 5.60 3.90 - 12.70 K/uL    RBC 4.93 4.60 - 6.20 M/uL    Hemoglobin 14.0 14.0 - 18.0 g/dL    Hematocrit 42.4 40.0 - 54.0 %    MCV 86 82 - 98 fL    MCH 28.4 27.0 - 31.0 pg    MCHC 33.0 32.0 - 36.0 g/dL    RDW 13.2 11.5 - 14.5 %    Platelets 126 (L) 150 - 450 K/uL    MPV 10.4 9.2 - 12.9 fL    Immature Granulocytes 0.2 0.0 - 0.5 %    Gran # (ANC) 3.0 1.8 - 7.7 K/uL    Immature Grans (Abs) 0.01 0.00 - 0.04 K/uL    Lymph # 1.6 1.0 - 4.8 K/uL    Mono # 0.9 0.3 - 1.0 K/uL    Eos # 0.1 0.0 - 0.5 K/uL    Baso # 0.04 0.00 - 0.20 K/uL    nRBC 0 0 /100 WBC    Gran % 53.2 38.0 - 73.0 %    Lymph % 28.4 18.0 - 48.0 %    Mono % 15.5 (H) 4.0 - 15.0 %    Eosinophil % 2.0 0.0 - 8.0 %    Basophil % 0.7 0.0 - 1.9 %    Differential Method Automated    Comprehensive metabolic panel    Collection Time: 02/05/24  6:09 PM   Result Value Ref Range    Sodium 140 136 - 145 mmol/L    Potassium 3.7 3.5 - 5.1 mmol/L    Chloride 106 95 - 110 mmol/L    CO2 23 23 - 29 mmol/L    Glucose 104 70 - 110 mg/dL    BUN 9 8 - 23 mg/dL    Creatinine  1.4 0.5 - 1.4 mg/dL    Calcium 9.5 8.7 - 10.5 mg/dL    Total Protein 7.9 6.0 - 8.4 g/dL    Albumin 4.0 3.5 - 5.2 g/dL    Total Bilirubin 0.6 0.1 - 1.0 mg/dL    Alkaline Phosphatase 65 55 - 135 U/L    AST 25 10 - 40 U/L    ALT 29 10 - 44 U/L    eGFR 57 (A) >60 mL/min/1.73 m^2    Anion Gap 11 8 - 16 mmol/L   Troponin I #1    Collection Time: 02/05/24  6:09 PM   Result Value Ref Range    Troponin I <0.006 0.000 - 0.026 ng/mL   B-Type natriuretic peptide (BNP)    Collection Time: 02/05/24  6:09 PM   Result Value Ref Range    BNP <10 0 - 99 pg/mL   D dimer, quantitative    Collection Time: 02/05/24  6:09 PM   Result Value Ref Range    D-Dimer 22.38 (H) <0.50 mg/L FEU   APTT    Collection Time: 02/05/24  7:56 PM   Result Value Ref Range    aPTT 28.2 21.0 - 32.0 sec   Protime-INR    Collection Time: 02/05/24  7:56 PM   Result Value Ref Range    Prothrombin Time 11.3 9.0 - 12.5 sec    INR 1.0 0.8 - 1.2   CBC auto differential    Collection Time: 02/05/24  7:56 PM   Result Value Ref Range    WBC 5.11 3.90 - 12.70 K/uL    RBC 4.73 4.60 - 6.20 M/uL    Hemoglobin 13.4 (L) 14.0 - 18.0 g/dL    Hematocrit 40.6 40.0 - 54.0 %    MCV 86 82 - 98 fL    MCH 28.3 27.0 - 31.0 pg    MCHC 33.0 32.0 - 36.0 g/dL    RDW 13.2 11.5 - 14.5 %    Platelets 112 (L) 150 - 450 K/uL    MPV 9.8 9.2 - 12.9 fL    Immature Granulocytes 0.4 0.0 - 0.5 %    Gran # (ANC) 2.6 1.8 - 7.7 K/uL    Immature Grans (Abs) 0.02 0.00 - 0.04 K/uL    Lymph # 1.5 1.0 - 4.8 K/uL    Mono # 0.9 0.3 - 1.0 K/uL    Eos # 0.1 0.0 - 0.5 K/uL    Baso # 0.04 0.00 - 0.20 K/uL    nRBC 0 0 /100 WBC    Gran % 49.9 38.0 - 73.0 %    Lymph % 29.7 18.0 - 48.0 %    Mono % 17.0 (H) 4.0 - 15.0 %    Eosinophil % 2.2 0.0 - 8.0 %    Basophil % 0.8 0.0 - 1.9 %    Differential Method Automated    CEA    Collection Time: 02/05/24  8:39 PM   Result Value Ref Range    CEA 2.5 0.0 - 5.0 ng/mL   Troponin I #2    Collection Time: 02/05/24  8:41 PM   Result Value Ref Range    Troponin I 0.006 0.000 - 0.026  ng/mL       Microbiology Results (last 7 days)       ** No results found for the last 168 hours. **             Imaging Results              US Lower Extremity Veins Bilateral (In process)                      CTA Chest Non-Coronary (PE Studies) (Edited Result - FINAL)  Result time 02/05/24 21:20:47      Addendum (preliminary) 1 of 1 by Joan Cheatham MD (02/05/24 21:20:47)      The first sentence of the findings should read: There are multiple right-sided pulmonary emboli.      Electronically signed by: Joan Cheatham  Date:    02/05/2024  Time:    21:20                 Final result by Joan Cheatham MD (02/05/24 19:39:32)                   Impression:      Multiple right-sided pulmonary emboli.    8 x 7 mm nodular density abutting the right middle lobe.  Findings may represent a perifissural lymph node, part of the normal pleural reflection or a pulmonary nodule.  This was seen on CT abdomen pelvis with contrast on 01/01/2017.    Bronchiectatic changes.  Ground-glass opacities in the lower lung fields, which may be due to atelectatic changes.  An infectious process may have a similar appearance.    Findings were called to Dr. Ji at 19:37 on 02/05/2024.    This report was flagged in Epic as abnormal.      Electronically signed by: Joan Cheatham  Date:    02/05/2024  Time:    19:39               Narrative:    EXAMINATION:  CT PULMONARY ANGIOGRAM WITH CONTRAST    CLINICAL HISTORY:  Shortness of breath.    TECHNIQUE:  CT of the chest with intravenous contrast for pulmonary artery angiogram was performed. Contiguous axial 1.25 mm images followed by 10 mm reconstructions with multiplanar and MIP reformations of the pulmonary arteries. No 3D post-angiographic imaging was performed on an independent workstation and reviewed.  75 ml of Omnipaque 350 was injected.    COMPARISON:  CT abdomen pelvis 01/01/2017    FINDINGS:  There is no evidence of pulmonary artery filling defect to suggest pulmonary  embolism. There is no aortic aneurysm or aortic dissection.    There is a an 8 x 7 mm nodular density in the right middle lobe abutting the minor fissure (series 3 axial image 262).  On the prior CT abdomen pelvis from 01/01/2017, a 7 mm right middle lobe pulmonary nodule was reported.  Bronchiectatic changes are seen at the lung bases.  Ground-glass opacities are seen at the lung bases and in the right middle lobe and lingula.    There is no evidence of mediastinal, hilar, or axillary adenopathy.    There is no pleural or pericardial effusion.    The heart size is within normal limits.  Right ventricular strain is suspected.    In the visualized upper abdomen, calcified granulomas are seen in the liver.                                       X-Ray Chest AP Portable (Final result)  Result time 02/05/24 18:49:38      Final result by Joan Cheatham MD (02/05/24 18:49:38)                   Impression:      Increased interstitial lung markings.  Findings may be related to interstitial edema or interstitial infiltrates.      Electronically signed by: Joan Cheatham  Date:    02/05/2024  Time:    18:49               Narrative:    EXAMINATION:  AP PORTABLE CHEST    CLINICAL HISTORY:  Chest Pain;    TECHNIQUE:  AP portable chest radiograph was submitted.    COMPARISON:  01/01/2017    FINDINGS:  AP portable chest radiograph demonstrates a cardiac silhouette within normal limits.  There are increased interstitial nonspecific lung markings.  There is no pneumothorax or pleural effusion.  There is decreased inspiratory effort when compared to the study 01/01/2017.                                        Assessment/Plan:     * Acute pulmonary emboli  Multiple R sided Pulmonary Emboli   Patient had previous clot 6 years ago, but stopped AC after 90 days  DVT/PE Hypercoagulable Workup:  Anti-Thrombin III  Factor V Leiden (FVL)  Lupus Anti-coagulant (DRVVT)  Cardiolipin Ab  Beta-2 glycoprotein   PSA was elevated for the first  time last year, previously very low, now 5  Repeat PSA, f/u urology and potentially oncology if very elevated  Having worsening urinary symptoms/BPH now  Will also, check CEA, CA 19-9 and AFP, but less likely  Will get b/l lower ext US as well, to r/o DVTs  Noted to have RH strain on CT, concerning, will watch in ICU for 24-48h  Did not have RV dilation/pHTN/chronic lung issues on last ECHO that would account for this  ECHO ordered  Card consulted    Elevated PSA  Patient has been seeing a steady rise over past 4-5 years.  Repeat PSA ordered  Consult Urology if higher      Latest Reference Range & Units 02/19/09 12:04 05/28/11 09:50 06/13/19 10:37 02/12/21 14:51 03/03/23 15:54   Prostate Specific Antigen 0.00 - 4.00 ng/mL 0.53 0.84 1.2 2.6 5.2 (H)         VTE Risk Mitigation (From admission, onward)           Ordered     heparin 25,000 units in dextrose 5% (100 units/ml) IV bolus from bag HIGH INTENSITY nomogram - [facility]  As needed (PRN)        Question:  Heparin Infusion Adjustment (DO NOT MODIFY ANSWER)  Answer:  [email or web address]\Images\Pharmacy\HeparinInfusions\heparin HIGH INTENSITY nomogram for [facility] [id number].pdf    02/05/24 1937     heparin 25,000 units in dextrose 5% (100 units/ml) IV bolus from bag HIGH INTENSITY nomogram - [facility]  As needed (PRN)        Question:  Heparin Infusion Adjustment (DO NOT MODIFY ANSWER)  Answer:  [email or web address]\epic\Images\Pharmacy\HeparinInfusions\heparin HIGH INTENSITY nomogram for [facility] [id number].pdf    02/05/24 1937     IP VTE HIGH RISK PATIENT  Once         02/05/24 2026     Place sequential compression device  Until discontinued         02/05/24 2026     heparin 25,000 units in dextrose 5% 250 mL (100 units/mL) infusion HIGH INTENSITY nomogram - [facility]  Continuous        Question:  Begin at (units/kg/hr)  Answer:  18 02/05/24 1937                               AdmissionCare    Guideline: Pulmonary Embolism - INPT, Inpatient    Based on the indications selected for the patient, the bed  status of Inpatient was determined to be MET    The following indications were selected as present at the time of evaluation of the patient:      - Right ventricular dysfunction or strain (eg, by echocardiogram, CT angiogram, or ECG)    AdmissionCare documentation entered by: Maryann Plata    [facility], 27th edition, Copyright © 2023 [facility] [employer], [facility] All Rights Reserved.  [id number]:[id number]:00    Tayo Eason MD  Department of Hospital Medicine  [facility] - Emergency Dept

## 2024-02-06 NOTE — NURSING
Nurses Note -- 4 Eyes      2/6/2024   3:10 PM      Skin assessed during: Transfer      [x] No Altered Skin Integrity Present    []Prevention Measures Documented      [] Yes- Altered Skin Integrity Present or Discovered   [] LDA Added if Not in Epic (Describe Wound)   [] New Altered Skin Integrity was Present on Admit and Documented in LDA   [] Wound Image Taken    Wound Care Consulted? No    Attending Nurse:  Jessica Baltazar RN/Staff Member:  VADIM Franklin

## 2024-02-06 NOTE — PROGRESS NOTES
"[facility] Care  [facility] Medicine  Progress Note    Patient Name: Suraj Constantino  MRN: [id number]  Patient Class: IP- Inpatient   Admission Date: 2/5/2024  Length of Stay: 1 days  Attending Physician: Zenaida Cameron MD  Primary Care Provider: Corinne Ramirez MD        Subjective:     Principal Problem:Acute pulmonary embolism        HPI:    Suraj Constantino is a 63 y.o. male who has a past medical history of Acute pulmonary emboli, DDD, lumbar, Elevated PSA, Pulmonary embolism, and Stroke, presented to the ED with CC of R chest pain.     Patient was feeling SOBOE as well as R chest pain. Not reproducible with palpations or deep breathing. He feels like someone punched him in the chest. He is mildly hypoxic on arrival with D-dimer 22, CTA showed multiple emboli on R lung. CTA showed evidence of RH strain. He has not chronic lung diseases and fairly normal ECHO/stress a few years ago. Patient denies being sedentary and works a lot. He has had another clot back 6 years ago, he was on Apix for 3 months. At one point, there was a doctor concerned he had lupus back in 1989. They also thought he had cancer because he had multiple large lymph nodes swollen then and night sweats "drenching the bed." But biopsy was negative and it eventually went away.. Patients PSA's have all been normal and low, however this last one was high at 5 (he seemed unaware of this). Repeat ordered. Colonoscopy did not find much 3 years or so ago, but was due back in 5 years bc of semi-poor prep. Started on Hep gtt; and given ?RH strain on CTA, is being watched in ICU.     Overview/Hospital Course:  Mr Suraj Constantino is a 63 y.o. man who was admitted with multiple R sided pulmonary emboli. He was started on heparin gtt and admitted to ICU with concerns for R heart strain. TTE completed and shows normal R heart function. Cardiology Dr Romero consulted.     Interval History: Sitting in chair near the window. Wife at his side. He has " chest aches on the R side. He is short of breath with talking. At rest, SpO2 89-90% on room air.     Review of Systems   Constitutional:  Negative for chills and fever.   Respiratory:  Positive for shortness of breath. Negative for cough and chest tightness.    Cardiovascular:  Positive for chest pain. Negative for leg swelling.   Gastrointestinal:  Negative for abdominal pain.   Genitourinary:  Negative for difficulty urinating.   Psychiatric/Behavioral:  Negative for confusion.      Objective:     Vital Signs (Most Recent):  Temp: 98.3 °F (36.8 °C) (02/06/24 0730)  Pulse: 70 (02/06/24 1101)  Resp: 16 (02/06/24 1101)  BP: 126/80 (02/06/24 1101)  SpO2: (!) 94 % (02/06/24 1101) Vital Signs (24h Range):  Temp:  [98.3 °F (36.8 °C)-98.4 °F (36.9 °C)] 98.3 °F (36.8 °C)  Pulse:  [63-86] 70  Resp:  [12-30] 16  SpO2:  [90 %-97 %] 94 %  BP: ()/(65-88) 126/80     Weight: 85.6 kg (188 lb 11.4 oz)  Body mass index is 27.87 kg/m².    Intake/Output Summary (Last 24 hours) at 2/6/2024 1144  Last data filed at 2/6/2024 1101  Gross per 24 hour   Intake 226.51 ml   Output --   Net 226.51 ml         Physical Exam  Vitals and nursing note reviewed.   Constitutional:       General: He is not in acute distress.     Appearance: He is not toxic-appearing.   HENT:      Head: Normocephalic and atraumatic.      Nose: Nose normal.      Mouth/Throat:      Mouth: Mucous membranes are moist.   Cardiovascular:      Rate and Rhythm: Normal rate and regular rhythm.   Pulmonary:      Effort: Pulmonary effort is normal.      Breath sounds: Normal breath sounds.      Comments: 2L NC  Abdominal:      General: Bowel sounds are normal.      Palpations: Abdomen is soft.   Musculoskeletal:      Right lower leg: No edema.      Left lower leg: No edema.   Skin:     General: Skin is warm and dry.   Neurological:      Mental Status: He is alert. Mental status is at baseline.             Significant Labs: All pertinent labs within the past 24 hours have  been reviewed.    Significant Imaging: I have reviewed all pertinent imaging results/findings within the past 24 hours.    Assessment/Plan:      * Acute pulmonary emboli  Multiple R sided Pulmonary Emboli   Patient had previous clot 6 years ago, but stopped AC after 90 days  DVT/PE Hypercoagulable Workup is in process  Anti-Thrombin III  Factor V Leiden (FVL)  Lupus Anti-coagulant (DRVVT)  Cardiolipin Ab  Beta-2 glycoprotein   Lower extremity US negative for DVT  TTE completed- RV function and size normal, PASP 24  Continues on heparin gtt  Cardiology Dr Romero consulted to discuss potential thrombectomy    Elevated PSA  Patient has been seeing a steady rise over past 4-5 years.  Repeat PSA ordered  This can be followed up in clinic by his PCP      VTE Risk Mitigation (From admission, onward)           Ordered     heparin 25,000 units in dextrose 5% (100 units/ml) IV bolus from bag HIGH INTENSITY nomogram - [facility]  As needed (PRN)        Question:  Heparin Infusion Adjustment (DO NOT MODIFY ANSWER)  Answer:  \\[email or web address]\epic\Images\Pharmacy\HeparinInfusions\heparin HIGH INTENSITY nomogram for [facility] [id number].pdf    02/05/24 1937     heparin 25,000 units in dextrose 5% (100 units/ml) IV bolus from bag HIGH INTENSITY nomogram - [facility]  As needed (PRN)        Question:  Heparin Infusion Adjustment (DO NOT MODIFY ANSWER)  Answer:  \\[email or web address]\epic\Images\Pharmacy\HeparinInfusions\heparin HIGH INTENSITY nomogram for [facility] [id number].pdf    02/05/24 1937     IP VTE HIGH RISK PATIENT  Once         02/05/24 2026     Place sequential compression device  Until discontinued         02/05/24 2026     heparin 25,000 units in dextrose 5% 250 mL (100 units/mL) infusion HIGH INTENSITY nomogram - [facility]  Continuous        Question:  Begin at (units/kg/hr)  Answer:  18    02/05/24 1937                    Discharge Planning   [place]:      Code Status: Full Code   Is the patient medically ready for discharge?:     Reason for patient still in hospital  (select all that apply): Patient trending condition and Consult recommendations               Critical care time spent on the evaluation and treatment of severe organ dysfunction, review of pertinent labs and imaging studies, discussions with consulting providers and discussions with patient/family: 35 minutes.      Zenaida Cameron MD  Department of Hospital Medicine   [facility] [place] - Intensive Care

## 2024-02-06 NOTE — HPI
Patient seen and examined in ICU.  Came in with pulmonary embolism.  Dyspnea on exertion and shortness of breath which started yesterday.  Does have past medical history of pulmonary embolism.  Currently not on anticoagulation at home.  It was stopped after a few months.  CT scan personally reviewed and discussed with pulmonology.  Multiple areas of thrombus.  Patient states he gets short of breath if he walks.      Results for orders placed or performed during the hospital encounter of 02/05/24 (from the past 2160 hour(s))   CTA Chest Non-Coronary (PE Studies)    Narrative    EXAMINATION:  CT PULMONARY ANGIOGRAM WITH CONTRAST    CLINICAL HISTORY:  Shortness of breath.    TECHNIQUE:  CT of the chest with intravenous contrast for pulmonary artery angiogram was performed. Contiguous axial 1.25 mm images followed by 10 mm reconstructions with multiplanar and MIP reformations of the pulmonary arteries. No 3D post-angiographic imaging was performed on an independent workstation and reviewed.  75 ml of Omnipaque 350 was injected.    COMPARISON:  CT abdomen pelvis 01/01/2017    FINDINGS:  There is no evidence of pulmonary artery filling defect to suggest pulmonary embolism. There is no aortic aneurysm or aortic dissection.    There is a an 8 x 7 mm nodular density in the right middle lobe abutting the minor fissure (series 3 axial image 262).  On the prior CT abdomen pelvis from 01/01/2017, a 7 mm right middle lobe pulmonary nodule was reported.  Bronchiectatic changes are seen at the lung bases.  Ground-glass opacities are seen at the lung bases and in the right middle lobe and lingula.    There is no evidence of mediastinal, hilar, or axillary adenopathy.    There is no pleural or pericardial effusion.    The heart size is within normal limits.  Right ventricular strain is suspected.    In the visualized upper abdomen, calcified granulomas are seen in the liver.      Impression    Multiple right-sided pulmonary  "emboli.    8 x 7 mm nodular density abutting the right middle lobe.  Findings may represent a perifissural lymph node, part of the normal pleural reflection or a pulmonary nodule.  This was seen on CT abdomen pelvis with contrast on 01/01/2017.    Bronchiectatic changes.  Ground-glass opacities in the lower lung fields, which may be due to atelectatic changes.  An infectious process may have a similar appearance.    Findings were called to Dr. Ji at 19:37 on 02/05/2024.    This report was flagged in Epic as abnormal.      Electronically signed by: Joan Cheatham  Date:    02/05/2024  Time:    19:39       V/Q scan:    Perfusion: Few small bilateral wedge-shaped perfusion defects.     Ventilation: Normal inhalation, equilibrium, and washout phases.  No focal ventilation defect or evidence of air trapping.      Impression:     No evidence of deep venous thrombosis in either lower extremity.      HPI:     Suraj Constantino is a 63 y.o. male who has a past medical history of Acute pulmonary emboli, DDD, lumbar, Elevated PSA, Pulmonary embolism, and Stroke, presented to the ED with CC of R chest pain.      Patient was feeling SOBOE as well as R chest pain. Not reproducible with palpations or deep breathing. He feels like someone punched him in the chest. He is mildly hypoxic on arrival with D-dimer 22, CTA showed multiple emboli on R lung. CTA showed evidence of RH strain. He has not chronic lung diseases and fairly normal ECHO/stress a few years ago. Patient denies being sedentary and works a lot. He has had another clot back 6 years ago, he was on Apix for 3 months. At one point, there was a doctor concerned he had lupus back in 1989. They also thought he had cancer because he had multiple large lymph nodes swollen then and night sweats "drenching the bed." But biopsy was negative and it eventually went away.. Patients PSA's have all been normal and low, however this last one was high at 5 (he seemed unaware of " this). Repeat ordered. Colonoscopy did not find much 3 years or so ago, but was due back in 5 years bc of semi-poor prep. Started on Hep gtt; and given ?RH strain on CTA, is being watched in ICU.      Overview/Hospital Course:  Mr Suraj Constantino is a 63 y.o. man who was admitted with multiple R sided pulmonary emboli. He was started on heparin gtt and admitted to ICU with concerns for R heart strain. TTE completed and shows normal R heart function. Cardiology Dr Romero consulted.

## 2024-02-07 LAB
ALBUMIN SERPL BCP-MCNC: 3.4 G/DL (ref 3.5–5.2)
ALP SERPL-CCNC: 56 U/L (ref 55–135)
ALT SERPL W/O P-5'-P-CCNC: 23 U/L (ref 10–44)
ANION GAP SERPL CALC-SCNC: 8 MMOL/L (ref 8–16)
APTT PPP: 52.1 SEC (ref 21–32)
APTT PPP: 57.2 SEC (ref 21–32)
AST SERPL-CCNC: 18 U/L (ref 10–40)
BASOPHILS # BLD AUTO: 0.03 K/UL (ref 0–0.2)
BASOPHILS NFR BLD: 0.7 % (ref 0–1.9)
BILIRUB SERPL-MCNC: 0.7 MG/DL (ref 0.1–1)
BUN SERPL-MCNC: 11 MG/DL (ref 8–23)
CALCIUM SERPL-MCNC: 9.2 MG/DL (ref 8.7–10.5)
CHLORIDE SERPL-SCNC: 105 MMOL/L (ref 95–110)
CO2 SERPL-SCNC: 25 MMOL/L (ref 23–29)
CREAT SERPL-MCNC: 1.3 MG/DL (ref 0.5–1.4)
DIFFERENTIAL METHOD BLD: ABNORMAL
EOSINOPHIL # BLD AUTO: 0.2 K/UL (ref 0–0.5)
EOSINOPHIL NFR BLD: 4 % (ref 0–8)
ERYTHROCYTE [DISTWIDTH] IN BLOOD BY AUTOMATED COUNT: 13.2 % (ref 11.5–14.5)
EST. GFR  (NO RACE VARIABLE): >60 ML/MIN/1.73 M^2
GLUCOSE SERPL-MCNC: 105 MG/DL (ref 70–110)
HCT VFR BLD AUTO: 40.8 % (ref 40–54)
HGB BLD-MCNC: 13 G/DL (ref 14–18)
IMM GRANULOCYTES # BLD AUTO: 0.02 K/UL (ref 0–0.04)
IMM GRANULOCYTES NFR BLD AUTO: 0.4 % (ref 0–0.5)
LYMPHOCYTES # BLD AUTO: 1.4 K/UL (ref 1–4.8)
LYMPHOCYTES NFR BLD: 30.1 % (ref 18–48)
MAGNESIUM SERPL-MCNC: 2 MG/DL (ref 1.6–2.6)
MCH RBC QN AUTO: 27.9 PG (ref 27–31)
MCHC RBC AUTO-ENTMCNC: 31.9 G/DL (ref 32–36)
MCV RBC AUTO: 88 FL (ref 82–98)
MONOCYTES # BLD AUTO: 0.8 K/UL (ref 0.3–1)
MONOCYTES NFR BLD: 16.7 % (ref 4–15)
NEUTROPHILS # BLD AUTO: 2.2 K/UL (ref 1.8–7.7)
NEUTROPHILS NFR BLD: 48.1 % (ref 38–73)
NRBC BLD-RTO: 0 /100 WBC
OHS QRS DURATION: 80 MS
OHS QTC CALCULATION: 430 MS
PHOSPHATE SERPL-MCNC: 3.6 MG/DL (ref 2.7–4.5)
PLATELET # BLD AUTO: 111 K/UL (ref 150–450)
PMV BLD AUTO: 10.3 FL (ref 9.2–12.9)
POTASSIUM SERPL-SCNC: 4.3 MMOL/L (ref 3.5–5.1)
PROT SERPL-MCNC: 7.2 G/DL (ref 6–8.4)
RBC # BLD AUTO: 4.66 M/UL (ref 4.6–6.2)
SODIUM SERPL-SCNC: 138 MMOL/L (ref 136–145)
WBC # BLD AUTO: 4.55 K/UL (ref 3.9–12.7)

## 2024-02-07 PROCEDURE — 84100 ASSAY OF PHOSPHORUS: CPT | Performed by: HOSPITALIST

## 2024-02-07 PROCEDURE — 21400001 HC TELEMETRY ROOM

## 2024-02-07 PROCEDURE — 80053 COMPREHEN METABOLIC PANEL: CPT | Performed by: HOSPITALIST

## 2024-02-07 PROCEDURE — 99233 SBSQ HOSP IP/OBS HIGH 50: CPT | Mod: ,,, | Performed by: INTERNAL MEDICINE

## 2024-02-07 PROCEDURE — 25000003 PHARM REV CODE 250: Performed by: STUDENT IN AN ORGANIZED HEALTH CARE EDUCATION/TRAINING PROGRAM

## 2024-02-07 PROCEDURE — 63600175 PHARM REV CODE 636 W HCPCS: Performed by: STUDENT IN AN ORGANIZED HEALTH CARE EDUCATION/TRAINING PROGRAM

## 2024-02-07 PROCEDURE — 85025 COMPLETE CBC W/AUTO DIFF WBC: CPT | Performed by: HOSPITALIST

## 2024-02-07 PROCEDURE — 36415 COLL VENOUS BLD VENIPUNCTURE: CPT | Performed by: HOSPITALIST

## 2024-02-07 PROCEDURE — 25000003 PHARM REV CODE 250: Performed by: HOSPITALIST

## 2024-02-07 PROCEDURE — 83735 ASSAY OF MAGNESIUM: CPT | Performed by: HOSPITALIST

## 2024-02-07 PROCEDURE — 85730 THROMBOPLASTIN TIME PARTIAL: CPT | Performed by: HOSPITALIST

## 2024-02-07 RX ORDER — HYDROMORPHONE HYDROCHLORIDE 1 MG/ML
1 INJECTION, SOLUTION INTRAMUSCULAR; INTRAVENOUS; SUBCUTANEOUS EVERY 6 HOURS PRN
Status: DISCONTINUED | OUTPATIENT
Start: 2024-02-07 | End: 2024-02-08 | Stop reason: HOSPADM

## 2024-02-07 RX ORDER — HYDROMORPHONE HYDROCHLORIDE 1 MG/ML
1 INJECTION, SOLUTION INTRAMUSCULAR; INTRAVENOUS; SUBCUTANEOUS EVERY 6 HOURS PRN
Status: DISCONTINUED | OUTPATIENT
Start: 2024-02-07 | End: 2024-02-07

## 2024-02-07 RX ADMIN — HYDROMORPHONE HYDROCHLORIDE 1 MG: 1 INJECTION, SOLUTION INTRAMUSCULAR; INTRAVENOUS; SUBCUTANEOUS at 07:02

## 2024-02-07 RX ADMIN — MELATONIN TAB 3 MG 6 MG: 3 TAB at 08:02

## 2024-02-07 RX ADMIN — APIXABAN 10 MG: 5 TABLET, FILM COATED ORAL at 08:02

## 2024-02-07 RX ADMIN — HYDROMORPHONE HYDROCHLORIDE 1 MG: 1 INJECTION, SOLUTION INTRAMUSCULAR; INTRAVENOUS; SUBCUTANEOUS at 10:02

## 2024-02-07 NOTE — ASSESSMENT & PLAN NOTE
Multiple R sided Pulmonary Emboli   Patient had previous clot 6 years ago, but stopped AC after 90 days  DVT/PE Hypercoagulable Workup is in process  Anti-Thrombin III  Factor V Leiden (FVL)  Lupus Anti-coagulant (DRVVT)  Cardiolipin Ab  Beta-2 glycoprotein   Lower extremity US negative for DVT  TTE completed- RV function and size normal, PASP 24  Of heparin gtt  Cardiology Dr Romero consulted no plans for thrombectomy transitioned to oral Eliquis.

## 2024-02-07 NOTE — NURSING
[facility] - [place], [place]  Nurses Note -- 4 Eyes      2/7/2024       Skin assessed on: Q Shift      [x] No Pressure Injuries Present    []Prevention Measures Documented    [] Yes LDA  for Pressure Injury Previously documented     [] Yes New Pressure Injury Discovered   [] LDA for New Pressure Injury Added      Attending RN:  Josise Bateman, ALBERT     Second RN:  Manda RN

## 2024-02-07 NOTE — PROGRESS NOTES
[facility]  Cardiology  Progress Note    Patient Name: Suraj Constantino  MRN: [id number]  Admission Date: 2/5/2024  Hospital Length of Stay: 2 days  Code Status: Full Code   Attending Physician: Maria E Alves,*   Primary Care Physician: Corinne Ramirez MD  Expected Discharge Date:   Principal Problem:Acute pulmonary embolism    Subjective:       Interval History:  Patient feeling much better.  Breathing much better.    Review of Systems   Constitutional: Negative.   HENT: Negative.     Eyes: Negative.    Cardiovascular: Negative.    Respiratory: Negative.     Endocrine: Negative.    Hematologic/Lymphatic: Negative.    Skin: Negative.    Musculoskeletal: Negative.    Gastrointestinal: Negative.    Genitourinary: Negative.    Neurological: Negative.    Psychiatric/Behavioral: Negative.     Allergic/Immunologic: Negative.      Objective:     Vital Signs (Most Recent):  Temp: 98.6 °F (37 °C) (02/07/24 1114)  Pulse: 67 (02/07/24 1114)  Resp: 16 (02/07/24 1114)  BP: 110/73 (02/07/24 1114)  SpO2: 95 % (02/07/24 1114) Vital Signs (24h Range):  Temp:  [98 °F (36.7 °C)-98.7 °F (37.1 °C)] 98.6 °F (37 °C)  Pulse:  [63-90] 67  Resp:  [12-20] 16  SpO2:  [92 %-96 %] 95 %  BP: (101-123)/(67-84) 110/73     Weight: 85.6 kg (188 lb 11.4 oz)  Body mass index is 27.87 kg/m².     SpO2: 95 %         Intake/Output Summary (Last 24 hours) at 2/7/2024 1253  Last data filed at 2/6/2024 2335  Gross per 24 hour   Intake 1112.45 ml   Output --   Net 1112.45 ml       Lines/Drains/Airways       Peripheral Intravenous Line  Duration                  Peripheral IV - Single Lumen 02/05/24 1809 20 G Right Antecubital 1 day         Peripheral IV - Single Lumen 02/05/24 2001 20 G Anterior;Left Forearm 1 day                       Physical Exam  Vitals reviewed.   Constitutional:       Appearance: He is well-developed.   HENT:      Head: Normocephalic.   Eyes:      Conjunctiva/sclera: Conjunctivae normal.      Pupils: Pupils are equal,  "round, and reactive to light.   Cardiovascular:      Rate and Rhythm: Normal rate and regular rhythm.      Heart sounds: Normal heart sounds.   Pulmonary:      Effort: Pulmonary effort is normal.      Breath sounds: Normal breath sounds.   Abdominal:      General: Bowel sounds are normal.      Palpations: Abdomen is soft.   Musculoskeletal:      Cervical back: Normal range of motion and neck supple.   Skin:     General: Skin is warm.   Neurological:      Mental Status: He is alert and oriented to person, place, and time.            Significant Labs: BMP:   Recent Labs   Lab 02/05/24 1809 02/06/24  0550 02/07/24  0746    135* 105    140 138   K 3.7 4.1 4.3    109 105   CO2 23 22* 25   BUN 9 11 11   CREATININE 1.4 1.3 1.3   CALCIUM 9.5 9.0 9.2   MG  --  1.9 2.0   , CMP   Recent Labs   Lab 02/05/24 1809 02/06/24  0550 02/07/24  0746    140 138   K 3.7 4.1 4.3    109 105   CO2 23 22* 25    135* 105   BUN 9 11 11   CREATININE 1.4 1.3 1.3   CALCIUM 9.5 9.0 9.2   PROT 7.9 7.0 7.2   ALBUMIN 4.0 3.5 3.4*   BILITOT 0.6 0.7 0.7   ALKPHOS 65 59 56   AST 25 19 18   ALT 29 24 23   ANIONGAP 11 9 8   , CBC   Recent Labs   Lab 02/05/24 1956 02/06/24  0550 02/07/24  0746   WBC 5.11 5.38 4.55   HGB 13.4* 13.2* 13.0*   HCT 40.6 39.7* 40.8   * 114* 111*   , INR   Recent Labs   Lab 02/05/24 1956   INR 1.0   , Lipid Panel No results for input(s): "CHOL", "HDL", "LDLCALC", "TRIG", "CHOLHDL" in the last 48 hours., Troponin   Recent Labs   Lab 02/05/24 1809 02/05/24 2041   TROPONINI <0.006 0.006   , and All pertinent lab results from the last 24 hours have been reviewed.    Significant Imaging: Echocardiogram: Transthoracic echo (TTE) complete (Cupid Only):   Results for orders placed or performed during the hospital encounter of 02/05/24   Echo   Result Value Ref Range    BSA 2.04 m2    LVOT stroke volume 63.35 cm3    LVIDd 3.99 3.5 - 6.0 cm    LV Systolic Volume 18.45 mL    LV Systolic " Volume Index 9.1 mL/m2    LVIDs 2.32 2.1 - 4.0 cm    LV Diastolic Volume 69.62 mL    LV Diastolic Volume Index 34.47 mL/m2    IVS 0.83 0.6 - 1.1 cm    LVOT diameter 2.26 cm    LVOT area 4.0 cm2    FS 42 28 - 44 %    Left Ventricle Relative Wall Thickness 0.42 cm    Posterior Wall 0.83 0.6 - 1.1 cm    LV mass 97.81 g    LV Mass Index 48 g/m2    MV Peak E Magdaleno 0.59 m/s    TDI LATERAL 0.07 m/s    TDI SEPTAL 0.07 m/s    E/E' ratio 8.43 m/s    MV Peak A Magdaleno 0.54 m/s    TR Max Magdaleno 2.29 m/s    E/A ratio 1.09     IVRT 122.74 msec    E wave deceleration time 244.26 msec    LV SEPTAL E/E' RATIO 8.43 m/s    LV LATERAL E/E' RATIO 8.43 m/s    PV Peak S Magdaleno 0.46 m/s    PV Peak D Magdaleno 0.33 m/s    Pulm vein S/D ratio 1.39     LVOT peak magdaleno 0.66 m/s    Left Ventricular Outflow Tract Mean Velocity 0.50 cm/s    Left Ventricular Outflow Tract Mean Gradient 1.12 mmHg    RVDD 3.53 cm    TAPSE 2.01 cm    LA size 3.20 cm    Left Atrium Minor Axis 3.59 cm    RA Major Axis 5.22 cm    RA Width 4.01 cm    AV mean gradient 3 mmHg    AV peak gradient 5 mmHg    Ao peak magdaleno 1.10 m/s    Ao VTI 22.40 cm    LVOT peak VTI 15.80 cm    AV valve area 2.83 cm²    AV Velocity Ratio 0.60     AV index (prosthetic) 0.71     SELENA by Velocity Ratio 2.41 cm²    MV stenosis pressure 1/2 time 70.83 ms    MV valve area p 1/2 method 3.11 cm2    TV peak gradient 1 mmHg    Triscuspid Valve Regurgitation Peak Gradient 21 mmHg    PV PEAK VELOCITY 0.70 m/s    PV peak gradient 2 mmHg    Sinus 3.86 cm    STJ 2.95 cm    Ascending aorta 3.13 cm    IVC diameter 1.27 cm    Mean e' 0.07 m/s    ZLVIDS -3.58     ZLVIDD -4.00     LA Volume Index 20.4 mL/m2    LA volume 41.26 cm3    Left Atrium Major Axis 5.1 cm    LA WIDTH 3.6 cm    TV resting pulmonary artery pressure 24 mmHg    RV TB RVSP 5 mmHg    Est. RA pres 3 mmHg    Narrative      Left Ventricle: The left ventricle is normal in size. Normal wall   thickness. Normal wall motion. There is normal systolic function with a    visually estimated ejection fraction of 60 - 65%. Grade I diastolic   dysfunction.    Right Ventricle: Normal right ventricular cavity size. Systolic   function is normal.    Aorta: Aortic root is mildly dilated measuring 3.86 cm.    Pulmonary Artery: The estimated pulmonary artery systolic pressure is   24 mmHg.       Assessment and Plan:     Brief HPI:     * Acute pulmonary emboli   CT scan personally reviewed.  BNP and troponin are normal.  RV to LV ratio is less than 0.9.  V/Q scan shows small bilateral perfusion defects.   No evidence of RV strain on echocardiogram.However patient is symptomatic , states gets short of breath  if he walks or  Talks in sentences.  Feels that his breathing is slightly better as compared to yesterday, but not back to baseline.  Continue heparin drip.  Will follow.    2/7:  Breathing much better today.  Not short of breath anymore.  Saturating well.  Continue medical management.  Transition to Eliquis 10 mg b.i.d. for 7 days followed by 5 mg b.i.d..  Heme-Onc consult which may be done as an outpatient.  Consider lifelong anticoagulation    Follow-up in Cardiology Clinic     Right-sided chest pain    Pleuritic chest pain.  Caused by pulmonary embolus        VTE Risk Mitigation (From admission, onward)           Ordered     heparin 25,000 units in dextrose 5% (100 units/ml) IV bolus from bag HIGH INTENSITY nomogram - [facility]  As needed (PRN)        Question:  Heparin Infusion Adjustment (DO NOT MODIFY ANSWER)  Answer:  [facility] [employer]\Images\Pharmacy\HeparinInfusions\heparin HIGH INTENSITY nomogram for [facility] [id number]    02/05/24 1937     heparin 25,000 units in dextrose 5% (100 units/ml) IV bolus from bag HIGH INTENSITY nomogram - [facility]  As needed (PRN)        Question:  Heparin Infusion Adjustment (DO NOT MODIFY ANSWER)  Answer:  \[employer]\[facility] [employer] [facility]\Images\Pharmacy\HeparinInfusions\heparin HIGH INTENSITY nomogram for [facility] [id number].pdf    02/05/24 1937     IP VTE HIGH RISK PATIENT  Once          02/05/24 2026     Place sequential compression device  Until discontinued         02/05/24 2026     heparin 25,000 units in dextrose 5% 250 mL (100 units/mL) infusion HIGH INTENSITY nomogram - [facility]  Continuous        Question:  Begin at (units/kg/hr)  Answer:  18    02/05/24 1937                    Mayela Romero MD  Cardiology  [facility]

## 2024-02-07 NOTE — PROGRESS NOTES
"[facility] Medicine  Progress Note    Patient Name: Suraj Constantino  MRN: [id number]  Patient Class: IP- Inpatient   Admission Date: 2/5/2024  Length of Stay: 2 days  Attending Physician: Maria E Alves,*  Primary Care Provider: Corinne Ramirez MD        Subjective:     Principal Problem:Acute pulmonary embolism        HPI:    Suraj Constantino is a 63 y.o. male who has a past medical history of Acute pulmonary emboli, DDD, lumbar, Elevated PSA, Pulmonary embolism, and Stroke, presented to the ED with CC of R chest pain.     Patient was feeling SOBOE as well as R chest pain. Not reproducible with palpations or deep breathing. He feels like someone punched him in the chest. He is mildly hypoxic on arrival with D-dimer 22, CTA showed multiple emboli on R lung. CTA showed evidence of RH strain. He has not chronic lung diseases and fairly normal ECHO/stress a few years ago. Patient denies being sedentary and works a lot. He has had another clot back 6 years ago, he was on Apix for 3 months. At one point, there was a doctor concerned he had lupus back in 1989. They also thought he had cancer because he had multiple large lymph nodes swollen then and night sweats "drenching the bed." But biopsy was negative and it eventually went away.. Patients PSA's have all been normal and low, however this last one was high at 5 (he seemed unaware of this). Repeat ordered. Colonoscopy did not find much 3 years or so ago, but was due back in 5 years bc of semi-poor prep. Started on Hep gtt; and given ?RH strain on CTA, is being watched in ICU.     Overview/Hospital Course:  Mr Suraj Constantino is a 63 y.o. man who was admitted with multiple R sided pulmonary emboli.  Ultrasound lower extremity with no evidence of DVT.  He was started on heparin gtt and admitted to ICU with concerns for R heart strain. TTE completed and shows normal R heart function.  RV to LV ratio less than 0.9.  Cardiology Dr Romero consulted.  " Heparin gtt discontinued this afternoon.  Transitioned to Eliquis 10 mg b.i.d. for 7 days followed by 5 mg b.i.d. lifelong.  To follow-up with cardiology and heme Onc clinic as outpatient.  Patient walked in the hallway and oxygen saturations dropped to 89% on room air.  Will monitor oxygen saturations overnight    Interval History:  Patient has stepped down from ICU.  Off heparin drip this afternoon.  Transitioned to oral Eliquis.    Review of Systems   Constitutional: Negative.    Cardiovascular:  Positive for chest pain. Negative for palpitations and leg swelling.   Gastrointestinal: Negative.    Neurological: Negative.    Psychiatric/Behavioral: Negative.       Objective:     Vital Signs (Most Recent):  Temp: 98.6 °F (37 °C) (02/07/24 1114)  Pulse: 86 (02/07/24 1521)  Resp: 16 (02/07/24 1114)  BP: 110/73 (02/07/24 1114)  SpO2: 95 % (02/07/24 1114) Vital Signs (24h Range):  Temp:  [98 °F (36.7 °C)-98.7 °F (37.1 °C)] 98.6 °F (37 °C)  Pulse:  [63-90] 86  Resp:  [16-20] 16  SpO2:  [92 %-96 %] 95 %  BP: (101-120)/(67-84) 110/73     Weight: 85.6 kg (188 lb 11.4 oz)  Body mass index is 27.87 kg/m².    Intake/Output Summary (Last 24 hours) at 2/7/2024 1625  Last data filed at 2/7/2024 1300  Gross per 24 hour   Intake 1340 ml   Output --   Net 1340 ml         Physical Exam  Constitutional:       Appearance: Normal appearance. He is normal weight.   Cardiovascular:      Rate and Rhythm: Normal rate. Rhythm irregular.      Pulses: Normal pulses.      Heart sounds: Normal heart sounds. No murmur heard.  Pulmonary:      Effort: Pulmonary effort is normal.      Breath sounds: Normal breath sounds.   Abdominal:      General: Bowel sounds are normal.      Palpations: Abdomen is soft.   Skin:     General: Skin is warm.   Neurological:      General: No focal deficit present.      Mental Status: He is alert. Mental status is at baseline.             Significant Labs: All pertinent labs within the past 24 hours have been  reviewed.    Significant Imaging: I have reviewed all pertinent imaging results/findings within the past 24 hours.    Assessment/Plan:      * Acute pulmonary emboli  Multiple R sided Pulmonary Emboli   Patient had previous clot 6 years ago, but stopped AC after 90 days  DVT/PE Hypercoagulable Workup is in process  Anti-Thrombin III  Factor V Leiden (FVL)  Lupus Anti-coagulant (DRVVT)  Cardiolipin Ab  Beta-2 glycoprotein   Lower extremity US negative for DVT  TTE completed- RV function and size normal, PASP 24  Of heparin gtt  Cardiology Dr Romero consulted no plans for thrombectomy transitioned to oral Eliquis.    Elevated PSA  Patient has been seeing a steady rise over past 4-5 years.  Repeat PSA ordered  This can be followed up in clinic by his PCP    Right-sided chest pain    Pleuritic chest pain, worsened with inspiration Likely secondary to pulmonary emboli.      VTE Risk Mitigation (From admission, onward)           Ordered     apixaban tablet 10 mg  2 times daily         02/07/24 1612     IP VTE HIGH RISK PATIENT  Once         02/05/24 2026     Place sequential compression device  Until discontinued         02/05/24 2026                    Discharge Planning   [place]:      Code Status: Full Code   Is the patient medically ready for discharge?:     Reason for patient still in hospital (select all that apply): Treatment  Discharge Plan A: Home                  Maria E Alves MD  Department of Hospital Medicine   [facility]

## 2024-02-07 NOTE — SUBJECTIVE & OBJECTIVE
Interval History:  Patient has stepped down from ICU.  Off heparin drip this afternoon.  Transitioned to oral Eliquis.    Review of Systems   Constitutional: Negative.    Cardiovascular:  Positive for chest pain. Negative for palpitations and leg swelling.   Gastrointestinal: Negative.    Neurological: Negative.    Psychiatric/Behavioral: Negative.       Objective:     Vital Signs (Most Recent):  Temp: 98.6 °F (37 °C) (02/07/24 1114)  Pulse: 86 (02/07/24 1521)  Resp: 16 (02/07/24 1114)  BP: 110/73 (02/07/24 1114)  SpO2: 95 % (02/07/24 1114) Vital Signs (24h Range):  Temp:  [98 °F (36.7 °C)-98.7 °F (37.1 °C)] 98.6 °F (37 °C)  Pulse:  [63-90] 86  Resp:  [16-20] 16  SpO2:  [92 %-96 %] 95 %  BP: (101-120)/(67-84) 110/73     Weight: 85.6 kg (188 lb 11.4 oz)  Body mass index is 27.87 kg/m².    Intake/Output Summary (Last 24 hours) at 2/7/2024 1625  Last data filed at 2/7/2024 1300  Gross per 24 hour   Intake 1340 ml   Output --   Net 1340 ml         Physical Exam  Constitutional:       Appearance: Normal appearance. He is normal weight.   Cardiovascular:      Rate and Rhythm: Normal rate. Rhythm irregular.      Pulses: Normal pulses.      Heart sounds: Normal heart sounds. No murmur heard.  Pulmonary:      Effort: Pulmonary effort is normal.      Breath sounds: Normal breath sounds.   Abdominal:      General: Bowel sounds are normal.      Palpations: Abdomen is soft.   Skin:     General: Skin is warm.   Neurological:      General: No focal deficit present.      Mental Status: He is alert. Mental status is at baseline.             Significant Labs: All pertinent labs within the past 24 hours have been reviewed.    Significant Imaging: I have reviewed all pertinent imaging results/findings within the past 24 hours.

## 2024-02-07 NOTE — NURSING
[facility]  Nurses Note -- 4 Eyes      2/6/2024       Skin assessed on: Q Shift      [x] No Pressure Injuries Present    [x]Prevention Measures Documented    [] Yes LDA  for Pressure Injury Previously documented     [] Yes New Pressure Injury Discovered   [] LDA for New Pressure Injury Added      Attending RN:  Cyn Erickson, RN     Second RN:  Jessica CANCHOLA RN

## 2024-02-07 NOTE — SUBJECTIVE & OBJECTIVE
Interval History:  Patient feeling much better.  Breathing much better.    Review of Systems   Constitutional: Negative.   HENT: Negative.     Eyes: Negative.    Cardiovascular: Negative.    Respiratory: Negative.     Endocrine: Negative.    Hematologic/Lymphatic: Negative.    Skin: Negative.    Musculoskeletal: Negative.    Gastrointestinal: Negative.    Genitourinary: Negative.    Neurological: Negative.    Psychiatric/Behavioral: Negative.     Allergic/Immunologic: Negative.      Objective:     Vital Signs (Most Recent):  Temp: 98.6 °F (37 °C) (02/07/24 1114)  Pulse: 67 (02/07/24 1114)  Resp: 16 (02/07/24 1114)  BP: 110/73 (02/07/24 1114)  SpO2: 95 % (02/07/24 1114) Vital Signs (24h Range):  Temp:  [98 °F (36.7 °C)-98.7 °F (37.1 °C)] 98.6 °F (37 °C)  Pulse:  [63-90] 67  Resp:  [12-20] 16  SpO2:  [92 %-96 %] 95 %  BP: (101-123)/(67-84) 110/73     Weight: 85.6 kg (188 lb 11.4 oz)  Body mass index is 27.87 kg/m².     SpO2: 95 %         Intake/Output Summary (Last 24 hours) at 2/7/2024 1253  Last data filed at 2/6/2024 2335  Gross per 24 hour   Intake 1112.45 ml   Output --   Net 1112.45 ml       Lines/Drains/Airways       Peripheral Intravenous Line  Duration                  Peripheral IV - Single Lumen 02/05/24 1809 20 G Right Antecubital 1 day         Peripheral IV - Single Lumen 02/05/24 2001 20 G Anterior;Left Forearm 1 day                       Physical Exam  Vitals reviewed.   Constitutional:       Appearance: He is well-developed.   HENT:      Head: Normocephalic.   Eyes:      Conjunctiva/sclera: Conjunctivae normal.      Pupils: Pupils are equal, round, and reactive to light.   Cardiovascular:      Rate and Rhythm: Normal rate and regular rhythm.      Heart sounds: Normal heart sounds.   Pulmonary:      Effort: Pulmonary effort is normal.      Breath sounds: Normal breath sounds.   Abdominal:      General: Bowel sounds are normal.      Palpations: Abdomen is soft.   Musculoskeletal:      Cervical back:  "Normal range of motion and neck supple.   Skin:     General: Skin is warm.   Neurological:      Mental Status: He is alert and oriented to person, place, and time.            Significant Labs: BMP:   Recent Labs   Lab 02/05/24 1809 02/06/24  0550 02/07/24  0746    135* 105    140 138   K 3.7 4.1 4.3    109 105   CO2 23 22* 25   BUN 9 11 11   CREATININE 1.4 1.3 1.3   CALCIUM 9.5 9.0 9.2   MG  --  1.9 2.0   , CMP   Recent Labs   Lab 02/05/24 1809 02/06/24  0550 02/07/24  0746    140 138   K 3.7 4.1 4.3    109 105   CO2 23 22* 25    135* 105   BUN 9 11 11   CREATININE 1.4 1.3 1.3   CALCIUM 9.5 9.0 9.2   PROT 7.9 7.0 7.2   ALBUMIN 4.0 3.5 3.4*   BILITOT 0.6 0.7 0.7   ALKPHOS 65 59 56   AST 25 19 18   ALT 29 24 23   ANIONGAP 11 9 8   , CBC   Recent Labs   Lab 02/05/24 1956 02/06/24  0550 02/07/24  0746   WBC 5.11 5.38 4.55   HGB 13.4* 13.2* 13.0*   HCT 40.6 39.7* 40.8   * 114* 111*   , INR   Recent Labs   Lab 02/05/24 1956   INR 1.0   , Lipid Panel No results for input(s): "CHOL", "HDL", "LDLCALC", "TRIG", "CHOLHDL" in the last 48 hours., Troponin   Recent Labs   Lab 02/05/24 1809 02/05/24 2041   TROPONINI <0.006 0.006   , and All pertinent lab results from the last 24 hours have been reviewed.    Significant Imaging: Echocardiogram: Transthoracic echo (TTE) complete (Cupid Only):   Results for orders placed or performed during the hospital encounter of 02/05/24   Echo   Result Value Ref Range    BSA 2.04 m2    LVOT stroke volume 63.35 cm3    LVIDd 3.99 3.5 - 6.0 cm    LV Systolic Volume 18.45 mL    LV Systolic Volume Index 9.1 mL/m2    LVIDs 2.32 2.1 - 4.0 cm    LV Diastolic Volume 69.62 mL    LV Diastolic Volume Index 34.47 mL/m2    IVS 0.83 0.6 - 1.1 cm    LVOT diameter 2.26 cm    LVOT area 4.0 cm2    FS 42 28 - 44 %    Left Ventricle Relative Wall Thickness 0.42 cm    Posterior Wall 0.83 0.6 - 1.1 cm    LV mass 97.81 g    LV Mass Index 48 g/m2    MV Peak E Magdaleno 0.59 " m/s    TDI LATERAL 0.07 m/s    TDI SEPTAL 0.07 m/s    E/E' ratio 8.43 m/s    MV Peak A Magdaleno 0.54 m/s    TR Max Magdaleno 2.29 m/s    E/A ratio 1.09     IVRT 122.74 msec    E wave deceleration time 244.26 msec    LV SEPTAL E/E' RATIO 8.43 m/s    LV LATERAL E/E' RATIO 8.43 m/s    PV Peak S Magdaleno 0.46 m/s    PV Peak D Magdaleno 0.33 m/s    Pulm vein S/D ratio 1.39     LVOT peak magdaleno 0.66 m/s    Left Ventricular Outflow Tract Mean Velocity 0.50 cm/s    Left Ventricular Outflow Tract Mean Gradient 1.12 mmHg    RVDD 3.53 cm    TAPSE 2.01 cm    LA size 3.20 cm    Left Atrium Minor Axis 3.59 cm    RA Major Axis 5.22 cm    RA Width 4.01 cm    AV mean gradient 3 mmHg    AV peak gradient 5 mmHg    Ao peak magdaleno 1.10 m/s    Ao VTI 22.40 cm    LVOT peak VTI 15.80 cm    AV valve area 2.83 cm²    AV Velocity Ratio 0.60     AV index (prosthetic) 0.71     SELENA by Velocity Ratio 2.41 cm²    MV stenosis pressure 1/2 time 70.83 ms    MV valve area p 1/2 method 3.11 cm2    TV peak gradient 1 mmHg    Triscuspid Valve Regurgitation Peak Gradient 21 mmHg    PV PEAK VELOCITY 0.70 m/s    PV peak gradient 2 mmHg    Sinus 3.86 cm    STJ 2.95 cm    Ascending aorta 3.13 cm    IVC diameter 1.27 cm    Mean e' 0.07 m/s    ZLVIDS -3.58     ZLVIDD -4.00     LA Volume Index 20.4 mL/m2    LA volume 41.26 cm3    Left Atrium Major Axis 5.1 cm    LA WIDTH 3.6 cm    TV resting pulmonary artery pressure 24 mmHg    RV TB RVSP 5 mmHg    Est. RA pres 3 mmHg    Narrative      Left Ventricle: The left ventricle is normal in size. Normal wall   thickness. Normal wall motion. There is normal systolic function with a   visually estimated ejection fraction of 60 - 65%. Grade I diastolic   dysfunction.    Right Ventricle: Normal right ventricular cavity size. Systolic   function is normal.    Aorta: Aortic root is mildly dilated measuring 3.86 cm.    Pulmonary Artery: The estimated pulmonary artery systolic pressure is   24 mmHg.

## 2024-02-07 NOTE — PLAN OF CARE
Problem: Adult Inpatient Plan of Care  Goal: Plan of Care Review  Flowsheets (Taken 2/7/2024 0241)  Plan of Care Reviewed With: patient  Goal: Absence of Hospital-Acquired Illness or Injury  Intervention: Identify and Manage Fall Risk  Flowsheets (Taken 2/7/2024 0241)  Safety Promotion/Fall Prevention: Fall Risk reviewed with patient/family  Intervention: Prevent Skin Injury  Flowsheets (Taken 2/7/2024 0241)  Body Position: position changed independently  Intervention: Prevent and Manage VTE (Venous Thromboembolism) Risk  Flowsheets (Taken 2/7/2024 0241)  VTE Prevention/Management: (IV Heparin)   ROM (active) performed   other (see comments)  Range of Motion: active ROM (range of motion) encouraged  Goal: Optimal Comfort and Wellbeing  Intervention: Monitor Pain and Promote Comfort  Flowsheets (Taken 2/7/2024 0241)  Pain Management Interventions:   pain management plan reviewed with patient/caregiver   around-the-clock dosing utilized  Intervention: Provide Person-Centered Care  Flowsheets (Taken 2/7/2024 0241)  Trust Relationship/Rapport:   care explained   choices provided   questions answered   questions encouraged   reassurance provided   thoughts/feelings acknowledged

## 2024-02-07 NOTE — ASSESSMENT & PLAN NOTE
CT scan personally reviewed.  BNP and troponin are normal.  RV to LV ratio is less than 0.9.  V/Q scan shows small bilateral perfusion defects.   No evidence of RV strain on echocardiogram.However patient is symptomatic , states gets short of breath  if he walks or  Talks in sentences.  Feels that his breathing is slightly better as compared to yesterday, but not back to baseline.  Continue heparin drip.  Will follow.    2/7:  Breathing much better today.  Not short of breath anymore.  Saturating well.  Continue medical management.  Transition to Eliquis 10 mg b.i.d. for 7 days followed by 5 mg b.i.d..  Heme-Onc consult which may be done as an outpatient.  Consider lifelong anticoagulation    Follow-up in Cardiology Clinic

## 2024-02-07 NOTE — NURSING
Oxygen Evaluation    Date Performed: 2024    1) Patient's Home O2 Sat on room air, while at rest: 95%    2) Patient's O2 Sat on room air while exercisin%

## 2024-02-07 NOTE — PLAN OF CARE
Consult received to make an appointment for patient to see Dr. Romero in Cardiology Clinic. In basket message sent to clinic to contact patient to schedule.

## 2024-02-07 NOTE — NURSING
Per handoff received from Jessica CANCHOLA RN. Patient care assumed. Patients overall condition assessed and patient appears to be in NAD with no complaints of pain. 20g RAC and 20g LFA PIV's are clean, dry, and intact. Heparin infusing at 11u/kg/hr. Call light in reach and patient instructed to inform the nurse if anything is needed. Patient stable and is continually being monitored.

## 2024-02-08 ENCOUNTER — TELEPHONE (OUTPATIENT)
Dept: INTERNAL MEDICINE | Facility: CLINIC | Age: 64
End: 2024-02-08
Payer: COMMERCIAL

## 2024-02-08 VITALS
TEMPERATURE: 98 F | RESPIRATION RATE: 18 BRPM | WEIGHT: 188.69 LBS | DIASTOLIC BLOOD PRESSURE: 76 MMHG | HEIGHT: 69 IN | SYSTOLIC BLOOD PRESSURE: 120 MMHG | HEART RATE: 81 BPM | OXYGEN SATURATION: 93 % | BODY MASS INDEX: 27.95 KG/M2

## 2024-02-08 PROBLEM — R07.9 RIGHT-SIDED CHEST PAIN: Status: RESOLVED | Noted: 2017-01-01 | Resolved: 2024-02-08

## 2024-02-08 LAB
ALBUMIN SERPL BCP-MCNC: 3.4 G/DL (ref 3.5–5.2)
ALP SERPL-CCNC: 52 U/L (ref 55–135)
ALT SERPL W/O P-5'-P-CCNC: 21 U/L (ref 10–44)
ANION GAP SERPL CALC-SCNC: 6 MMOL/L (ref 8–16)
APTT IMM NP PPP: ABNORMAL SEC (ref 32–48)
APTT P HEP NEUT PPP: 44 SEC (ref 32–48)
AST SERPL-CCNC: 17 U/L (ref 10–40)
AT III ACT/NOR PPP CHRO: 90 % (ref 83–118)
BASOPHILS # BLD AUTO: 0.03 K/UL (ref 0–0.2)
BASOPHILS NFR BLD: 0.7 % (ref 0–1.9)
BILIRUB SERPL-MCNC: 0.5 MG/DL (ref 0.1–1)
BUN SERPL-MCNC: 13 MG/DL (ref 8–23)
CALCIUM SERPL-MCNC: 9 MG/DL (ref 8.7–10.5)
CHLORIDE SERPL-SCNC: 103 MMOL/L (ref 95–110)
CO2 SERPL-SCNC: 27 MMOL/L (ref 23–29)
CONFIRM APTT STACLOT: ABNORMAL
CREAT SERPL-MCNC: 1.2 MG/DL (ref 0.5–1.4)
DIFFERENTIAL METHOD BLD: ABNORMAL
DRVVT SCREEN TO CONFIRM RATIO: ABNORMAL RATIO
EOSINOPHIL # BLD AUTO: 0.2 K/UL (ref 0–0.5)
EOSINOPHIL NFR BLD: 3.8 % (ref 0–8)
ERYTHROCYTE [DISTWIDTH] IN BLOOD BY AUTOMATED COUNT: 12.9 % (ref 11.5–14.5)
EST. GFR  (NO RACE VARIABLE): >60 ML/MIN/1.73 M^2
GLUCOSE SERPL-MCNC: 116 MG/DL (ref 70–110)
HCT VFR BLD AUTO: 39.4 % (ref 40–54)
HGB BLD-MCNC: 12.9 G/DL (ref 14–18)
IMM GRANULOCYTES # BLD AUTO: 0.02 K/UL (ref 0–0.04)
IMM GRANULOCYTES NFR BLD AUTO: 0.5 % (ref 0–0.5)
LA 3 SCREEN W REFLEX-IMP: ABNORMAL
LA APTT+DRVVT+PT W REFLEX PPP: ABNORMAL
LYMPHOCYTES # BLD AUTO: 1.3 K/UL (ref 1–4.8)
LYMPHOCYTES NFR BLD: 28.2 % (ref 18–48)
MAGNESIUM SERPL-MCNC: 2 MG/DL (ref 1.6–2.6)
MCH RBC QN AUTO: 28.1 PG (ref 27–31)
MCHC RBC AUTO-ENTMCNC: 32.7 G/DL (ref 32–36)
MCV RBC AUTO: 86 FL (ref 82–98)
MIXING DRVVT: ABNORMAL SEC (ref 33–44)
MONOCYTES # BLD AUTO: 0.6 K/UL (ref 0.3–1)
MONOCYTES NFR BLD: 14 % (ref 4–15)
NEUTROPHILS # BLD AUTO: 2.4 K/UL (ref 1.8–7.7)
NEUTROPHILS NFR BLD: 52.8 % (ref 38–73)
NRBC BLD-RTO: 0 /100 WBC
PHOSPHATE SERPL-MCNC: 3.2 MG/DL (ref 2.7–4.5)
PLATELET # BLD AUTO: 128 K/UL (ref 150–450)
PMV BLD AUTO: 10.5 FL (ref 9.2–12.9)
POTASSIUM SERPL-SCNC: 4.1 MMOL/L (ref 3.5–5.1)
PROT SERPL-MCNC: 7.3 G/DL (ref 6–8.4)
PROTHROMBIN TIME: 17.8 SEC (ref 12–15.5)
RBC # BLD AUTO: 4.59 M/UL (ref 4.6–6.2)
REPTILASE TIME: 16.5 SEC
SCREEN APTT: >150 SEC (ref 32–48)
SCREEN DRVVT: 44 SEC (ref 33–44)
SODIUM SERPL-SCNC: 136 MMOL/L (ref 136–145)
THROMBIN TIME: >150 SEC (ref 14.7–19.5)
WBC # BLD AUTO: 4.44 K/UL (ref 3.9–12.7)

## 2024-02-08 PROCEDURE — 85025 COMPLETE CBC W/AUTO DIFF WBC: CPT | Performed by: STUDENT IN AN ORGANIZED HEALTH CARE EDUCATION/TRAINING PROGRAM

## 2024-02-08 PROCEDURE — 84100 ASSAY OF PHOSPHORUS: CPT | Performed by: HOSPITALIST

## 2024-02-08 PROCEDURE — 25000003 PHARM REV CODE 250: Performed by: STUDENT IN AN ORGANIZED HEALTH CARE EDUCATION/TRAINING PROGRAM

## 2024-02-08 PROCEDURE — 36415 COLL VENOUS BLD VENIPUNCTURE: CPT | Performed by: HOSPITALIST

## 2024-02-08 PROCEDURE — 83735 ASSAY OF MAGNESIUM: CPT | Performed by: HOSPITALIST

## 2024-02-08 PROCEDURE — 80053 COMPREHEN METABOLIC PANEL: CPT | Performed by: HOSPITALIST

## 2024-02-08 PROCEDURE — 63600175 PHARM REV CODE 636 W HCPCS: Performed by: STUDENT IN AN ORGANIZED HEALTH CARE EDUCATION/TRAINING PROGRAM

## 2024-02-08 RX ADMIN — APIXABAN 10 MG: 5 TABLET, FILM COATED ORAL at 08:02

## 2024-02-08 RX ADMIN — HYDROMORPHONE HYDROCHLORIDE 1 MG: 1 INJECTION, SOLUTION INTRAMUSCULAR; INTRAVENOUS; SUBCUTANEOUS at 05:02

## 2024-02-08 NOTE — PLAN OF CARE
Problem: Pain Acute  Goal: Acceptable Pain Control and Functional Ability  Outcome: Adequate for Care Transition     Problem: Cardiac Impairment  Goal: Optimal Activity Tolerance  Outcome: Adequate for Care Transition     Problem: VTE (Venous Thromboembolism)  Goal: VTE (Venous Thromboembolism) Symptom Resolution  Outcome: Adequate for Care Transition     Problem: Adult Inpatient Plan of Care  Goal: Plan of Care Review  Outcome: Adequate for Care Transition  Goal: Patient-Specific Goal (Individualized)  Outcome: Adequate for Care Transition  Goal: Absence of Hospital-Acquired Illness or Injury  Outcome: Adequate for Care Transition  Goal: Optimal Comfort and Wellbeing  Outcome: Adequate for Care Transition  Goal: Readiness for Transition of Care  Outcome: Adequate for Care Transition

## 2024-02-08 NOTE — TELEPHONE ENCOUNTER
----- Message from Salma Loera RN sent at 2/8/2024  9:39 AM CST -----  Regarding: Hospital follow up  Please contact patient to schedule 1 week follow up appointment. Patient discharge today. Thank you

## 2024-02-08 NOTE — NURSING
[facility], [facility] [place]  Nurses Note -- 4 Eyes      2/8/2024       Skin assessed on: Q Shift      [x] No Pressure Injuries Present    [x]Prevention Measures Documented    [] Yes LDA  for Pressure Injury Previously documented     [] Yes New Pressure Injury Discovered   [] LDA for New Pressure Injury Added      Attending RN:  Jossie Bateman, ALBERT     Second RN:  Tash RN

## 2024-02-08 NOTE — PLAN OF CARE
Problem: Pain Acute  Goal: Acceptable Pain Control and Functional Ability  Outcome: Ongoing, Progressing     Problem: Cardiac Impairment  Goal: Optimal Activity Tolerance  Outcome: Ongoing, Progressing     Problem: Adult Inpatient Plan of Care  Goal: Plan of Care Review  Outcome: Ongoing, Progressing  Flowsheets (Taken 2/8/2024 0609)  Plan of Care Reviewed With: patient  Goal: Optimal Comfort and Wellbeing  Outcome: Ongoing, Progressing  Intervention: Monitor Pain and Promote Comfort  Flowsheets (Taken 2/8/2024 0609)  Pain Management Interventions:   pillow support provided   position adjusted   quiet environment facilitated   around-the-clock dosing utilized

## 2024-02-08 NOTE — PLAN OF CARE
[facility] - Med Surg  Discharge Final Note    Discharge Disposition: Home    New DME ordered / company name: none    Relevant SDOH / Transition of Care Barriers:  none    Scheduled followup appointment: Cardiology with Dr Romero and pcp scheduled, listed on AVS.     Referrals placed: none    Transportation: family    Patient educated on discharge services and updated on DC plan. Bedside RN notified, patient clear to discharge from Case Management Perspective.       Primary Care Provider: Corinne Ramirez MD    Expected Discharge Date: 2/8/2024    Final Discharge Note (most recent)       Final Note - 02/08/24 1320          Final Note    Assessment Type Final Discharge Note (P)      Anticipated Discharge Disposition Home or Self Care (P)      Hospital Resources/Appts/Education Provided Appointments scheduled and added to AVS;Provided patient/caregiver with written discharge plan information (P)         Post-Acute Status    Discharge Delays None known at this time (P)                      Important Message from Medicare             Contact Info       Corinne Ramirez MD   Specialty: Internal Medicine   Relationship: PCP - General    [facility] [place] [address]   Phone: [phone]       Next Steps: Schedule an appointment as soon as possible for a visit in 1 week(s)    Instructions: Message sent for clinic to contact patient to schedule    Corinne Ramirez MD   Specialty: Internal Medicine   Relationship: PCP - General    [address]  [place] [address]   Phone: [phone]       Next Steps: Follow up

## 2024-02-08 NOTE — NURSING
Ambulated downstairs for discharge home, no distress noted.[facility] [place]  Nurses Note -- 4 Eyes      2/8/2024       Skin assessed on: Discharge      [x] No Pressure Injuries Present    []Prevention Measures Documented    [] Yes LDA  for Pressure Injury Previously documented     [] Yes New Pressure Injury Discovered   [] LDA for New Pressure Injury Added      Attending RN:  Jossie Bateman RN     Second RN:  Jono RN

## 2024-02-08 NOTE — NURSING
[facility], [facility] [place]  Nurses Note -- 4 Eyes      2/7/2024       Skin assessed on: Q Shift      [x] No Pressure Injuries Present    [x]Prevention Measures Documented    [] Yes LDA  for Pressure Injury Previously documented     [] Yes New Pressure Injury Discovered   [] LDA for New Pressure Injury Added      Attending RN:  Bridger Gilliland RN     Second RN:  ALBERT Sethi

## 2024-02-09 LAB
B2 GLYCOPROT1 IGA SER QL: 3.7 U/ML
B2 GLYCOPROT1 IGG SER QL: 2.3 U/ML
B2 GLYCOPROT1 IGM SER QL: <2.4 U/ML
CARDIOLIPIN IGG SER IA-ACNC: <9.4 GPL (ref 0–14.99)
CARDIOLIPIN IGM SER IA-ACNC: <9.4 MPL (ref 0–12.49)
F5 P.R506Q BLD/T QL: NEGATIVE

## 2024-02-09 NOTE — DISCHARGE SUMMARY
"[facility] Medicine  Discharge Summary      Patient Name: Suraj Constantino  MRN: [id number]  [facility]: [id number]  Patient Class: IP- Inpatient  Admission Date: 2/5/2024  Hospital Length of Stay: 3 days  Discharge Date and Time:  02/08/2024 7:30 PM  Attending Physician: No att. providers found   Discharging Provider: Maria E Alves MD  Primary Care Provider: Corinne Ramirez MD    Primary Care Team: Networked reference to record PCT     HPI:     Suraj Constantino is a 63 y.o. male who has a past medical history of Acute pulmonary emboli, DDD, lumbar, Elevated PSA, Pulmonary embolism, and Stroke, presented to the ED with CC of R chest pain.     Patient was feeling SOBOE as well as R chest pain. Not reproducible with palpations or deep breathing. He feels like someone punched him in the chest. He is mildly hypoxic on arrival with D-dimer 22, CTA showed multiple emboli on R lung. CTA showed evidence of RH strain. He has not chronic lung diseases and fairly normal ECHO/stress a few years ago. Patient denies being sedentary and works a lot. He has had another clot back 6 years ago, he was on Apix for 3 months. At one point, there was a doctor concerned he had lupus back in 1989. They also thought he had cancer because he had multiple large lymph nodes swollen then and night sweats "drenching the bed." But biopsy was negative and it eventually went away.. Patients PSA's have all been normal and low, however this last one was high at 5 (he seemed unaware of this). Repeat ordered. Colonoscopy did not find much 3 years or so ago, but was due back in 5 years bc of semi-poor prep. Started on Hep gtt; and given ?RH strain on CTA, is being watched in ICU.     * No surgery found *      Hospital Course:   Mr Suraj Constantino is a 63 y.o. man who was admitted with multiple R sided pulmonary emboli.  Ultrasound lower extremity with no evidence of DVT.  He was started on heparin gtt and admitted to ICU with concerns " for R heart strain. TTE completed and shows normal R heart function.  RV to LV ratio less than 0.9.  Cardiology Dr Romero consulted.  Heparin gtt discontinued this afternoon.  Transitioned to Eliquis 10 mg b.i.d. for 7 days followed by 5 mg b.i.d. lifelong.  To follow-up with cardiology and heme Onc clinic as outpatient.  Patient's oxygen saturations at 94-96% with ambulation in hallway     Goals of Care Treatment Preferences:  Code Status: Full Code      Consults:   Consults (From admission, onward)          Status Ordering Provider     Inpatient consult to Social Work  Once        Provider:  (Not yet assigned)    LILI Velásquez     Inpatient consult to Cardiology  Once        Provider:  Luis Maravilla MD    Completed MARK ANTHONY KAT new Assessment & Plan notes have been filed under this hospital service since the last note was generated.  Service: Hospital Medicine    Final Active Diagnoses:    Diagnosis Date Noted POA    PRINCIPAL PROBLEM:  Acute pulmonary emboli [I26.99] 01/01/2017 Yes    Elevated PSA [R97.20] 02/05/2024 Yes      Problems Resolved During this Admission:    Diagnosis Date Noted Date Resolved POA    Right-sided chest pain [R07.9] 01/01/2017 02/08/2024 Yes       Discharged Condition: good\    Disposition: Home or Self Care    Follow Up:   Follow-up Information       Corinne Ramirez MD. Schedule an appointment as soon as possible for a visit in 1 week(s).    Specialty: Internal Medicine  Why: Message sent for clinic to contact patient to schedule  Contact information:  [address]  [place] [id number] [phone]               Corinne Ramirez MD Follow up.    Specialty: Internal Medicine  Contact information:  [address]  [place] [id number] [phone]                           Patient Instructions:      Diet Adult Regular     Activity as tolerated       Significant Diagnostic Studies: Labs: [facility]   Recent Labs   Lab 02/07/24  0746  02/08/24 0329    136   K 4.3 4.1    103   CO2 25 27    116*   BUN 11 13   CREATININE 1.3 1.2   CALCIUM 9.2 9.0   PROT 7.2 7.3   ALBUMIN 3.4* 3.4*   BILITOT 0.7 0.5   ALKPHOS 56 52*   AST 18 17   ALT 23 21   ANIONGAP 8 6*    and CBC   Recent Labs   Lab 02/07/24  0746 02/08/24 0329   WBC 4.55 4.44   HGB 13.0* 12.9*   HCT 40.8 39.4*   * 128*       Pending Diagnostic Studies:       Procedure Component Value Units Date/Time    Beta-2 Glycoprotein Abs (IgA, IgG, IgM) [[id number]] Collected: 02/05/24 2039    Order Status: Sent Lab Status: In process Updated: 02/05/24 2047    Specimen: Blood     Cardiolipin antibody [[id number]] Collected: 02/05/24 2039    Order Status: Sent Lab Status: In process Updated: 02/05/24 2047    Specimen: Blood     DRVVT [[id number]] Collected: 02/05/24 2039    Order Status: Sent Lab Status: In process Updated: 02/05/24 2047    Specimen: Blood     Factor 5 leiden [[id number]] Collected: 02/05/24 2039    Order Status: Sent Lab Status: In process Updated: 02/05/24 2047    Specimen: Blood            Medications:  Reconciled Home Medications:      Medication List        START taking these medications      * ELIQUIS 5 mg Tab  Generic drug: apixaban  Take 2 tablets (10 mg total) by mouth 2 (two) times daily for 6 days, THEN Take 1 tablet (5 mg total) by mouth 2 (two) times daily.     * apixaban 5 mg Tab  Commonly known as: ELIQUIS  Take 2 tablets (10 mg total) by mouth 2 (two) times daily. for 6 days           * This list has 2 medication(s) that are the same as other medications prescribed for you. Read the directions carefully, and ask your doctor or other care provider to review them with you.                  Indwelling Lines/Drains at time of discharge:   Lines/Drains/Airways       None                   Time spent on the discharge of patient: 35 minutes         Maria E Alves MD  Department of Hospital Medicine  [facility]

## 2024-02-14 ENCOUNTER — PATIENT MESSAGE (OUTPATIENT)
Dept: OPTOMETRY | Facility: CLINIC | Age: 64
End: 2024-02-14
Payer: COMMERCIAL

## 2024-02-14 ENCOUNTER — PATIENT OUTREACH (OUTPATIENT)
Dept: ADMINISTRATIVE | Facility: CLINIC | Age: 64
End: 2024-02-14
Payer: COMMERCIAL

## 2024-02-14 NOTE — PROGRESS NOTES
C3 nurse spoke with Suraj Constantino for a TCC post hospital discharge follow up call. The patient has a scheduled [facility] appointment with Corinne Ramirez MD (PCP)'s NP on 2/19/24 @ 9:30am.    The patient will need a release to return to work from PCP, as the MD at [facility] would not clear him when he was seen on 2/12/24. The patient noted that the MD told him his oxygen levels were too low and BP was slightly low. The patient notes that his O2 levels were in the 90's, but he is unsure of the exact reading. He will bring the paperwork from this visit to PCP appointment to determine if he can return to work.

## 2024-02-19 ENCOUNTER — OFFICE VISIT (OUTPATIENT)
Dept: INTERNAL MEDICINE | Facility: CLINIC | Age: 64
End: 2024-02-19
Payer: COMMERCIAL

## 2024-02-19 ENCOUNTER — TELEPHONE (OUTPATIENT)
Dept: INTERNAL MEDICINE | Facility: CLINIC | Age: 64
End: 2024-02-19

## 2024-02-19 ENCOUNTER — OFFICE VISIT (OUTPATIENT)
Dept: UROLOGY | Facility: CLINIC | Age: 64
End: 2024-02-19
Payer: COMMERCIAL

## 2024-02-19 VITALS
HEART RATE: 63 BPM | OXYGEN SATURATION: 96 % | WEIGHT: 195.75 LBS | SYSTOLIC BLOOD PRESSURE: 110 MMHG | BODY MASS INDEX: 28.99 KG/M2 | HEIGHT: 69 IN | DIASTOLIC BLOOD PRESSURE: 60 MMHG

## 2024-02-19 VITALS
BODY MASS INDEX: 29.31 KG/M2 | HEART RATE: 73 BPM | DIASTOLIC BLOOD PRESSURE: 72 MMHG | WEIGHT: 197.88 LBS | HEIGHT: 69 IN | SYSTOLIC BLOOD PRESSURE: 113 MMHG

## 2024-02-19 DIAGNOSIS — R97.20 ELEVATED PSA: ICD-10-CM

## 2024-02-19 DIAGNOSIS — I26.99 PULMONARY EMBOLISM, UNSPECIFIED CHRONICITY, UNSPECIFIED PULMONARY EMBOLISM TYPE, UNSPECIFIED WHETHER ACUTE COR PULMONALE PRESENT: Primary | ICD-10-CM

## 2024-02-19 PROCEDURE — 3078F DIAST BP <80 MM HG: CPT | Mod: CPTII,S$GLB,, | Performed by: STUDENT IN AN ORGANIZED HEALTH CARE EDUCATION/TRAINING PROGRAM

## 2024-02-19 PROCEDURE — 3078F DIAST BP <80 MM HG: CPT | Mod: CPTII,S$GLB,, | Performed by: PHYSICIAN ASSISTANT

## 2024-02-19 PROCEDURE — 3074F SYST BP LT 130 MM HG: CPT | Mod: CPTII,S$GLB,, | Performed by: STUDENT IN AN ORGANIZED HEALTH CARE EDUCATION/TRAINING PROGRAM

## 2024-02-19 PROCEDURE — 1111F DSCHRG MED/CURRENT MED MERGE: CPT | Mod: CPTII,S$GLB,, | Performed by: STUDENT IN AN ORGANIZED HEALTH CARE EDUCATION/TRAINING PROGRAM

## 2024-02-19 PROCEDURE — 3008F BODY MASS INDEX DOCD: CPT | Mod: CPTII,S$GLB,, | Performed by: STUDENT IN AN ORGANIZED HEALTH CARE EDUCATION/TRAINING PROGRAM

## 2024-02-19 PROCEDURE — 99999 PR PBB SHADOW E&M-EST. PATIENT-LVL V: CPT | Mod: PBBFAC,,, | Performed by: PHYSICIAN ASSISTANT

## 2024-02-19 PROCEDURE — 1159F MED LIST DOCD IN RCRD: CPT | Mod: CPTII,S$GLB,, | Performed by: STUDENT IN AN ORGANIZED HEALTH CARE EDUCATION/TRAINING PROGRAM

## 2024-02-19 PROCEDURE — 99213 OFFICE O/P EST LOW 20 MIN: CPT | Mod: S$GLB,,, | Performed by: PHYSICIAN ASSISTANT

## 2024-02-19 PROCEDURE — 1159F MED LIST DOCD IN RCRD: CPT | Mod: CPTII,S$GLB,, | Performed by: PHYSICIAN ASSISTANT

## 2024-02-19 PROCEDURE — 1160F RVW MEDS BY RX/DR IN RCRD: CPT | Mod: CPTII,S$GLB,, | Performed by: PHYSICIAN ASSISTANT

## 2024-02-19 PROCEDURE — 99214 OFFICE O/P EST MOD 30 MIN: CPT | Mod: S$GLB,,, | Performed by: STUDENT IN AN ORGANIZED HEALTH CARE EDUCATION/TRAINING PROGRAM

## 2024-02-19 PROCEDURE — 99999 PR PBB SHADOW E&M-EST. PATIENT-LVL III: CPT | Mod: PBBFAC,,, | Performed by: STUDENT IN AN ORGANIZED HEALTH CARE EDUCATION/TRAINING PROGRAM

## 2024-02-19 PROCEDURE — 3074F SYST BP LT 130 MM HG: CPT | Mod: CPTII,S$GLB,, | Performed by: PHYSICIAN ASSISTANT

## 2024-02-19 PROCEDURE — 1111F DSCHRG MED/CURRENT MED MERGE: CPT | Mod: CPTII,S$GLB,, | Performed by: PHYSICIAN ASSISTANT

## 2024-02-19 NOTE — LETTER
February 19, 2024        Corinne Ramirez MD  [address]  [facility] [address]             [facility] - U[facility] [address]  [address]  [place] [address]  [facility] [address]  Phone: [phone]  Fax: [phone]   Patient: Suraj Constantino   MR Number: [id number]   YOB: 1960   Date of Visit: 2/19/2024       Dear Dr. Ramirez:    Thank you for referring Suraj Constantino to me for evaluation. Below are the relevant portions of my assessment and plan of care.            If you have questions, please do not hesitate to call me. I look forward to following Suraj along with you.    Sincerely,      Jean Claude Cheatham MD           CC    No Recipients

## 2024-02-19 NOTE — LETTER
February 19, 2024    Suraj Constantino  [address] [place] [address] [address] [place] [address]             [facility]  [address]  [facility] [address]  Phone: [phone]  Fax: [phone] Dear Mr. Constantino:    Suraj Constantino may return to work with no restrictions on February 21, 2024.    If you have any questions or concerns, please don't hesitate to call.    Sincerely,        Anahy Lunsford PA-C

## 2024-02-19 NOTE — PROGRESS NOTES
Subjective:       Patient ID: Suraj Constantino is a 63 y.o. male.        Chief Complaint: Follow-up    Suraj Constantino is an established patient of Corinne Ramirez MD here today for hospital f/u visit.    Transitional Care Note    Family and/or Caretaker present at visit?  No.  Diagnostic tests reviewed/disposition: No diagnosic tests pending after this hospitalization.  Disease/illness education: Yes  Home health/community services discussion/referrals: Patient does not have home health established from hospital visit.  They do not need home health.  If needed, we will set up home health for the patient.   Establishment or re-establishment of referral orders for community resources: No other necessary community resources.   Discussion with other health care providers: No discussion with other health care providers necessary.     Admitted 2/5-2/8 due to PE.  Last PE approximately 5 years ago tx with eliquis.  He is feeling fine now.  He cut the grass yesterday with no shortness of breath.  He works in maintenance for [employer].  He is ready to return to work but must also get cleared by employee health.      He is due soon for his annual exam.    PSA elevated in lab work from hospital, increased compared to last year, now up to 6.5.  Denies any urinary sx.           Review of Systems   Constitutional:  Negative for appetite change, chills, fatigue and fever.   HENT:  Negative for congestion and sore throat.    Eyes:  Negative for visual disturbance.   Respiratory:  Negative for cough, chest tightness and shortness of breath.    Cardiovascular:  Negative for chest pain, palpitations and leg swelling.   Gastrointestinal:  Negative for abdominal pain, blood in stool, constipation, diarrhea, nausea and vomiting.   Genitourinary:  Negative for dysuria, frequency, hematuria and urgency.   Musculoskeletal:  Negative for arthralgias and back pain.   Skin:  Negative for rash.   Neurological:  Negative for dizziness,  syncope, weakness and headaches.   Psychiatric/Behavioral:  Negative for dysphoric mood and sleep disturbance. The patient is not nervous/anxious.        Objective:      Physical Exam  Vitals and nursing note reviewed.   Constitutional:       Appearance: He is well-developed.   HENT:      Head: Normocephalic.      Right Ear: External ear normal.      Left Ear: External ear normal.   Eyes:      Pupils: Pupils are equal, round, and reactive to light.   Cardiovascular:      Rate and Rhythm: Normal rate and regular rhythm.      Heart sounds: Normal heart sounds. No murmur heard.     No friction rub. No gallop.   Pulmonary:      Effort: Pulmonary effort is normal. No respiratory distress.      Breath sounds: Normal breath sounds.   Abdominal:      Palpations: Abdomen is soft.      Tenderness: There is no abdominal tenderness.   Musculoskeletal:         General: No swelling.   Skin:     General: Skin is warm and dry.   Neurological:      General: No focal deficit present.      Mental Status: He is alert.   Psychiatric:         Mood and Affect: Mood normal.         Assessment:       1. Pulmonary embolism, unspecified chronicity, unspecified pulmonary embolism type, unspecified whether acute cor pulmonale present    2. Elevated PSA        Plan:       Suraj was seen today for follow-up.    Diagnoses and all orders for this visit:    Pulmonary embolism, unspecified chronicity, unspecified pulmonary embolism type, unspecified whether acute cor pulmonale present  -     Ambulatory referral/consult to Hematology / Oncology; Future  -     REFILL: apixaban (ELIQUIS) 5 mg Tab; Take 1 tablet (5 mg total) by mouth 2 (two) times daily.    Elevated PSA  -     Ambulatory referral/consult to Urology; Future    Schedule annual with PCP  He has f/u scheduled with Dr. Romero    Pt has been given instructions populated from patient instructions database and has verbalized understanding of the after visit summary and information contained  "wherein.    Follow up with a primary care provider. May go to ER for acute shortness of breath, lightheadedness, fever, or any other emergent complaints or changes in condition.    "This note will be shared with the patient"    Future Appointments   Date Time Provider Department Center   3/12/2024  3:00 PM Mayela Romero MD [facility] CARDIO [facility]   3/19/2024  3:00 PM [facility]                 "

## 2024-02-19 NOTE — TELEPHONE ENCOUNTER
Please call patient  PSA (prostate level) was elevated in lab work from hospital and more elevated compared to last year  Would recommend consult with urology to discuss

## 2024-02-19 NOTE — PROGRESS NOTES
"[facility] - Urology [facility] [address]   Clinic Note    SUBJECTIVE:     Chief Complaint: elevated PSA    History of Present Illness:  Suraj Constantino is a 63 y.o. male who presents to clinic for elevated PSA. He is new to our clinic referred by Anahy Lunsford.     He was recently admitted from 2/5/24-2/8/24 for bilateral PE. He was transitioned from heparin gtt to Eliquis  (plan for lifelong Eliquis.) His PSA while inpatient was 6.5 (5.8% free). He denies being catheterized during this hospitalization. Previous PSA was 5.2 in 03/2023.  Family history: Noncontributory    Anticoagulation:  Yes - Eliquis    OBJECTIVE:     Estimated body mass index is 29.22 kg/m² as calculated from the following:    Height as of this encounter: 5' 9" (1.753 m).    Weight as of this encounter: 89.8 kg (197 lb 13.8 oz).    Vital Signs (Most Recent)  Pulse: 73 (02/19/24 1453)  BP: 113/72 (02/19/24 1453)    Physical Exam  Constitutional:       Appearance: Normal appearance.   HENT:      Head: Normocephalic and atraumatic.   Eyes:      Conjunctiva/sclera: Conjunctivae normal.   Pulmonary:      Effort: Pulmonary effort is normal.   Abdominal:      General: Abdomen is flat. There is no distension.   Genitourinary:     Comments: Patient refuses BELTRAN  Musculoskeletal:         General: Normal range of motion.   Skin:     General: Skin is warm and dry.   Neurological:      General: No focal deficit present.      Mental Status: He is alert and oriented to person, place, and time.   Psychiatric:         Mood and Affect: Mood normal.         Behavior: Behavior normal.         Thought Content: Thought content normal.         Judgment: Judgment normal.         Lab Results   Component Value Date    BUN 13 02/08/2024    CREATININE 1.2 02/08/2024    WBC 4.44 02/08/2024    HGB 12.9 (L) 02/08/2024    HCT 39.4 (L) 02/08/2024     (L) 02/08/2024    AST 17 02/08/2024    ALT 21 02/08/2024    ALKPHOS 52 (L) 02/08/2024    ALBUMIN 3.4 (L) 02/08/2024    HGBA1C 5.7 " 06/13/2007        Lab Results   Component Value Date    PSA 5.2 (H) 03/03/2023    PSA 2.6 02/12/2021    PSA 1.2 06/13/2019    PSA 0.84 05/28/2011    PSA 0.53 02/19/2009    PSAFREE 0.38 02/05/2024    PSAFREEPCT 5.85 02/05/2024       Urine dip showed no blood, leukocyte esterase, and nitrite. Negative protein.     ASSESSMENT     1. Elevated PSA      PLAN:   1. Elevated PSA  -     Ambulatory referral/consult to Urology  -     MRI Prostate W W/O Contrast; Future; Expected date: 02/19/2024         Will obtain MRI prostate for further evaluation. With two elevated PSAs would like to obtain biopsy, but will need to defer until safe to hold anticoagulation.    Jean Claude Cheatham MD     Letter to Anahy Lunsford PA-C, Loyda Ramirez MD

## 2024-03-12 ENCOUNTER — OFFICE VISIT (OUTPATIENT)
Dept: CARDIOLOGY | Facility: CLINIC | Age: 64
End: 2024-03-12
Payer: COMMERCIAL

## 2024-03-12 VITALS
HEIGHT: 69 IN | WEIGHT: 195.31 LBS | OXYGEN SATURATION: 94 % | DIASTOLIC BLOOD PRESSURE: 79 MMHG | SYSTOLIC BLOOD PRESSURE: 110 MMHG | BODY MASS INDEX: 28.93 KG/M2 | HEART RATE: 76 BPM | RESPIRATION RATE: 18 BRPM

## 2024-03-12 DIAGNOSIS — I26.99 PULMONARY EMBOLISM, UNSPECIFIED CHRONICITY, UNSPECIFIED PULMONARY EMBOLISM TYPE, UNSPECIFIED WHETHER ACUTE COR PULMONALE PRESENT: Primary | ICD-10-CM

## 2024-03-12 DIAGNOSIS — G47.30 SLEEP APNEA, UNSPECIFIED TYPE: ICD-10-CM

## 2024-03-12 DIAGNOSIS — I26.99 ACUTE PULMONARY EMBOLISM, UNSPECIFIED PULMONARY EMBOLISM TYPE, UNSPECIFIED WHETHER ACUTE COR PULMONALE PRESENT: ICD-10-CM

## 2024-03-12 PROCEDURE — 99999 PR PBB SHADOW E&M-EST. PATIENT-LVL IV: CPT | Mod: PBBFAC,,, | Performed by: INTERNAL MEDICINE

## 2024-03-12 PROCEDURE — 3008F BODY MASS INDEX DOCD: CPT | Mod: CPTII,S$GLB,, | Performed by: INTERNAL MEDICINE

## 2024-03-12 PROCEDURE — 3078F DIAST BP <80 MM HG: CPT | Mod: CPTII,S$GLB,, | Performed by: INTERNAL MEDICINE

## 2024-03-12 PROCEDURE — 3074F SYST BP LT 130 MM HG: CPT | Mod: CPTII,S$GLB,, | Performed by: INTERNAL MEDICINE

## 2024-03-12 PROCEDURE — 1159F MED LIST DOCD IN RCRD: CPT | Mod: CPTII,S$GLB,, | Performed by: INTERNAL MEDICINE

## 2024-03-12 PROCEDURE — 99214 OFFICE O/P EST MOD 30 MIN: CPT | Mod: S$GLB,,, | Performed by: INTERNAL MEDICINE

## 2024-03-12 NOTE — PROGRESS NOTES
CARDIOVASCULAR CONSULTATION          REASON FOR CONSULT:   Suraj Constantino is a 63 y.o. male who presents for   Chief Complaint   Patient presents with    Follow-up          HISTORY OF PRESENT ILLNESS:     Patient is a pleasant 63-year-old man.  Seen in the hospital with pulmonary embolism.  Was treated medically.  Doing fine no shortness of breath.  Main complaint is tiredness and daytime somnolence.       PAST MEDICAL HISTORY:     Past Medical History:   Diagnosis Date    Acute pulmonary emboli 01/01/2017    DDD (degenerative disc disease), lumbar 04/14/2023    Elevated PSA 02/05/2024    Pulmonary embolism     Stroke        PAST SURGICAL HISTORY:     Past Surgical History:   Procedure Laterality Date    COLONOSCOPY N/A 4/23/2021    Procedure: COLONOSCOPY;  Surgeon: Ranjith Garcia MD;  Location: [facility] ([address] [place] [address]);  Service: Endoscopy;  Laterality: N/A;  positive covid 2/12/21-BB    SURGICAL REMOVAL OF LYMPH NODE  1989    right arm           SOCIAL HISTORY:     Social History     Socioeconomic History    Marital status:    Occupational History     Employer: [employer]   Tobacco Use    Smoking status: Never    Smokeless tobacco: Never   Substance and Sexual Activity    Alcohol use: Yes     Alcohol/week: 1.0 standard drink of alcohol     Types: 1 Cans of beer per week    Drug use: Never   Social History Narrative    , lives alone, 2 kids.       FAMILY HISTORY:     Family History   Problem Relation Age of Onset    Heart disease Father        REVIEW OF SYSTEMS:   Review of Systems   Constitutional: Positive for malaise/fatigue.   HENT: Negative.     Eyes: Negative.    Cardiovascular: Negative.    Respiratory: Negative.     Endocrine: Negative.    Hematologic/Lymphatic: Negative.    Skin: Negative.    Musculoskeletal: Negative.    Gastrointestinal: Negative.    Genitourinary: Negative.    Neurological: Negative.    Psychiatric/Behavioral: Negative.     Allergic/Immunologic: Negative.        A  "10 point review of systems was performed and all the pertinent positives have been mentioned. Rest of review of systems was negative.        PHYSICAL EXAM:     Vitals:    03/12/24 1454   BP: 110/79   Pulse: 76   Resp: 18    Body mass index is 28.84 kg/m².  Weight: 88.6 kg (195 lb 5.2 oz)   Height: 5' 9" (175.3 cm)     Physical Exam  Vitals reviewed.   Constitutional:       Appearance: He is well-developed.   HENT:      Head: Normocephalic.   Eyes:      Conjunctiva/sclera: Conjunctivae normal.      Pupils: Pupils are equal, round, and reactive to light.   Cardiovascular:      Rate and Rhythm: Normal rate and regular rhythm.      Heart sounds: Normal heart sounds.   Pulmonary:      Effort: Pulmonary effort is normal.      Breath sounds: Normal breath sounds.   Abdominal:      General: Bowel sounds are normal.      Palpations: Abdomen is soft.   Musculoskeletal:      Cervical back: Normal range of motion and neck supple.   Skin:     General: Skin is warm.   Neurological:      Mental Status: He is alert and oriented to person, place, and time.           DATA:     Laboratory:  CBC:  Recent Labs   Lab 02/06/24  0550 02/07/24  0746 02/08/24  0329   WBC 5.38 4.55 4.44   Hemoglobin 13.2 L 13.0 L 12.9 L   Hematocrit 39.7 L 40.8 39.4 L   Platelets 114 L 111 L 128 L       CHEMISTRIES:  Recent Labs   Lab 02/06/24  0550 02/07/24  0746 02/08/24  0329   Glucose 135 H 105 116 H   Sodium 140 138 136   Potassium 4.1 4.3 4.1   BUN 11 11 13   Creatinine 1.3 1.3 1.2   Calcium 9.0 9.2 9.0   Magnesium 1.9 2.0 2.0       CARDIAC BIOMARKERS:  Recent Labs   Lab 02/05/24  1809 02/05/24  2041   Troponin I <0.006 0.006       COAGS:  Recent Labs   Lab 02/05/24  1956   INR 1.0       LIPIDS/LFTS:  Recent Labs   Lab 03/03/23  1554 02/05/24  1809 02/06/24  0550 02/07/24  0746 02/08/24  0329   Cholesterol 174  --   --   --   --    Triglycerides 105  --   --   --   --    HDL 50  --   --   --   --    LDL Cholesterol 103.0  --   --   --   --    Non-HDL " Cholesterol 124  --   --   --   --    AST 29   < > 19 18 17   ALT 25   < > 24 23 21    < > = values in this interval not displayed.       Hemoglobin A1C   Date Value Ref Range Status   06/13/2007 5.7 4.5 - 6.2 % Final       TSH  Recent Labs   Lab 03/03/23  1554   TSH 2.538       The 10-year ASCVD risk score (Tahir VALDES, et al., 2019) is: 6.5%    Values used to calculate the score:      Age: 63 years      Sex: Male      Is Non- : Yes      Diabetic: No      Tobacco smoker: No      Systolic Blood Pressure: 110 mmHg      Is BP treated: No      HDL Cholesterol: 50 mg/dL      Total Cholesterol: 174 mg/dL       BNP    Lab Results   Component Value Date/Time    BNP <10 02/05/2024 06:09 PM    BNP 17 01/01/2017 05:06 PM            ECHO    Results for orders placed during the hospital encounter of 02/05/24    Echo    Interpretation Summary    Left Ventricle: The left ventricle is normal in size. Normal wall thickness. Normal wall motion. There is normal systolic function with a visually estimated ejection fraction of 60 - 65%. Grade I diastolic dysfunction.    Right Ventricle: Normal right ventricular cavity size. Systolic function is normal.    Aorta: Aortic root is mildly dilated measuring 3.86 cm.    Pulmonary Artery: The estimated pulmonary artery systolic pressure is 24 mmHg.      STRESS TEST    No results found for this or any previous visit.        CATH    No results found for this or any previous visit.              ASSESSMENT AND PLAN     Patient Active Problem List   Diagnosis    Acute pulmonary emboli    Decreased peripheral vision of left eye    DDD (degenerative disc disease), lumbar    Lumbar spondylosis    Elevated PSA         ALLERGIES AND MEDICATION:     Review of patient's allergies indicates:   Allergen Reactions    Pcn [penicillins] Hives        Medication List            Accurate as of March 12, 2024  3:56 PM. If you have any questions, ask your nurse or doctor.                 CONTINUE taking these medications      apixaban 5 mg Tab  Commonly known as: ELIQUIS  Take 1 tablet (5 mg total) by mouth 2 (two) times daily.              Orders Placed This Encounter   Procedures    TSH    T4, Free    Ambulatory referral/consult to Hematology / Oncology    Ambulatory referral/consult to Sleep Disorders       Tiredness and daytime somnolence check TSH free T4.  Referral to Heme-Onc has been made.  Further evaluation and management per primary care physician     Pulmonary embolism.  Unprovoked.  Second episode.  Heme-Onc referral has been placed.  Continue Eliquis for life    Follow-up after 6 m      Thank you very much for involving me in the care of your patient.  Please do not hesitate to contact me if there are any questions.      Mayela Romero MD, FACC, [facility]  Interventional Cardiologist, [facility].           This note was dictated with the help of speech recognition software.  There might be un-intended errors and/or substitutions.

## 2024-03-15 ENCOUNTER — TELEPHONE (OUTPATIENT)
Dept: HEMATOLOGY/ONCOLOGY | Facility: CLINIC | Age: 64
End: 2024-03-15
Payer: COMMERCIAL

## 2024-03-15 NOTE — TELEPHONE ENCOUNTER
TC attempted to patient to schedule an appointment from a referral.  No response  message left on VM for pt to contact office for assistance w/ scheduling an appointment

## 2024-03-19 ENCOUNTER — HOSPITAL ENCOUNTER (OUTPATIENT)
Dept: RADIOLOGY | Facility: HOSPITAL | Age: 64
Discharge: HOME | End: 2024-03-19
Attending: STUDENT IN AN ORGANIZED HEALTH CARE EDUCATION/TRAINING PROGRAM
Payer: COMMERCIAL

## 2024-03-19 ENCOUNTER — OFFICE VISIT (OUTPATIENT)
Dept: INTERNAL MEDICINE | Facility: CLINIC | Age: 64
End: 2024-03-19
Payer: COMMERCIAL

## 2024-03-19 VITALS
DIASTOLIC BLOOD PRESSURE: 74 MMHG | HEART RATE: 79 BPM | SYSTOLIC BLOOD PRESSURE: 102 MMHG | HEIGHT: 69 IN | WEIGHT: 194 LBS | BODY MASS INDEX: 28.73 KG/M2 | OXYGEN SATURATION: 97 %

## 2024-03-19 DIAGNOSIS — I26.99 PULMONARY EMBOLISM, UNSPECIFIED CHRONICITY, UNSPECIFIED PULMONARY EMBOLISM TYPE, UNSPECIFIED WHETHER ACUTE COR PULMONALE PRESENT: ICD-10-CM

## 2024-03-19 DIAGNOSIS — R53.83 FATIGUE, UNSPECIFIED TYPE: ICD-10-CM

## 2024-03-19 DIAGNOSIS — R97.20 ELEVATED PSA: ICD-10-CM

## 2024-03-19 DIAGNOSIS — N40.0 BENIGN PROSTATIC HYPERPLASIA, UNSPECIFIED WHETHER LOWER URINARY TRACT SYMPTOMS PRESENT: ICD-10-CM

## 2024-03-19 DIAGNOSIS — Z00.00 ROUTINE GENERAL MEDICAL EXAMINATION AT A HEALTH CARE FACILITY: Primary | ICD-10-CM

## 2024-03-19 DIAGNOSIS — D68.9 CLOTTING DISORDER: ICD-10-CM

## 2024-03-19 PROCEDURE — 99999 PR PBB SHADOW E&M-EST. PATIENT-LVL IV: CPT | Mod: PBBFAC,,, | Performed by: INTERNAL MEDICINE

## 2024-03-19 PROCEDURE — 3074F SYST BP LT 130 MM HG: CPT | Mod: CPTII,S$GLB,, | Performed by: INTERNAL MEDICINE

## 2024-03-19 PROCEDURE — 72197 MRI PELVIS W/O & W/DYE: CPT | Mod: 26,,, | Performed by: INTERNAL MEDICINE

## 2024-03-19 PROCEDURE — 3078F DIAST BP <80 MM HG: CPT | Mod: CPTII,S$GLB,, | Performed by: INTERNAL MEDICINE

## 2024-03-19 PROCEDURE — 1159F MED LIST DOCD IN RCRD: CPT | Mod: CPTII,S$GLB,, | Performed by: INTERNAL MEDICINE

## 2024-03-19 PROCEDURE — 25500020 PHARM REV CODE 255: Performed by: STUDENT IN AN ORGANIZED HEALTH CARE EDUCATION/TRAINING PROGRAM

## 2024-03-19 PROCEDURE — A9585 GADOBUTROL INJECTION: HCPCS | Performed by: STUDENT IN AN ORGANIZED HEALTH CARE EDUCATION/TRAINING PROGRAM

## 2024-03-19 PROCEDURE — 99396 PREV VISIT EST AGE 40-64: CPT | Mod: S$GLB,,, | Performed by: INTERNAL MEDICINE

## 2024-03-19 PROCEDURE — 3008F BODY MASS INDEX DOCD: CPT | Mod: CPTII,S$GLB,, | Performed by: INTERNAL MEDICINE

## 2024-03-19 PROCEDURE — 72197 MRI PELVIS W/O & W/DYE: CPT | Mod: TC

## 2024-03-19 RX ORDER — TADALAFIL 20 MG/1
20 TABLET ORAL DAILY PRN
Qty: 30 TABLET | Refills: 2 | Status: SHIPPED | OUTPATIENT
Start: 2024-03-19 | End: 2025-03-19

## 2024-03-19 RX ORDER — GADOBUTROL 604.72 MG/ML
10 INJECTION INTRAVENOUS
Status: COMPLETED | OUTPATIENT
Start: 2024-03-19 | End: 2024-03-19

## 2024-03-19 RX ADMIN — GADOBUTROL 10 ML: 604.72 INJECTION INTRAVENOUS at 06:03

## 2024-03-19 NOTE — PROGRESS NOTES
Internal Medicine Annual Exam       CHIEF COMPLAINT     The patient, Suraj Constantino, who is a 64 y.o. male presents for an annual exam.    HPI     2/5/24 Reported pain to right chest and became SOB when speaking, then went to ED.He was diagnosed with a right sided pulmonary embolus 2/5/24 and hospitalized until 2/8/24.He does not have chest pain, shortness of breath.  He has been feeling well. He had not been taking aspirin in over a year.   Prescribed Eliquis 5 mg po twice daily.  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    Previous PE 6 years ago. He stayed overnight in the hospital due to low oxygen levels. He was discharged the next day on Eliquis 5 mg twice daily. He took one prescription of the Eliquis.      Elevated PSA- seen by urology in Feb.  MRI scheduled today.    Hematology apt March 22, 2024    He was working in [employer] at the [employer] - in [employer] [facility].      He continues to have an intermittent aching pain (6-8/10) in the great toe of the left foot for years. He is now having more left foot pain.  He climbs ladders and steps daily for work.  I xrayed the left foot 5/23/2011  He presented to the ED on 1/1/17 due to right - he has a bunion and arthritis of the left foot. He does not want surgery    Exercise at work by riding bike and climbing stairs and ladders.     He has low back pain in the morning that improves throughout the day. He does not want to see PT. Reports he may join a gym and start working out.     His sister has cutaneous lupus. Mother had a blood clot at age 85 after a long airplane flight. Brother age 68 just had a couple strokes and is now in a wheelchair.  He was drinker.         He drinks very little alcohol.  Never smoked.      Vision is better. Hearing is getting worse. Hearing test on job last week and reports hearing test results have not changed.     He is no longer taking testosterone injections- does not want to take them      REVIEW OF SYSTEMS: No fevers,  chills, night sweats, fatigue, visual change, hearing loss, sinus congestion, sore throat, chest pain, shortness of breath, nausea, vomiting, constipation, diarrhea, dysuria, hematuria, polydipsia, polyuria, joint pain, muscle pain, headaches, anxiety, depression, insomnia.         PAST MEDICAL HISTORY:  1. Acute stroke in the left frontoparietal region, June 2007. Symptoms resolved upon discharge from the hospital. MRI was consistent with acute stroke.   2. Suggested carrier for factor V Leiden mutation in June 2007 at the time of his stroke. HE had a low activated protein C level at 1.8 with negative gene mutation.  3. Lymph node biopsy in 1989 that was benign.  4. History of leukopenia. He reports he had a workup at [facility] that was negative.  5. Pulmonary embolus Jan 2017.     Past Surgical History:   Procedure Laterality Date    COLONOSCOPY N/A 4/23/2021    Procedure: COLONOSCOPY;  Surgeon: Ranjith Garcia MD;  Location: [address]);  Service: Endoscopy;  Laterality: N/A;  positive covid 2/12/21-BB    SURGICAL REMOVAL OF LYMPH NODE  1989    right arm        Family History   Problem Relation Age of Onset    Heart disease Father         Social History     Socioeconomic History    Marital status:    Occupational History     Employer: [employer]   Tobacco Use    Smoking status: Never    Smokeless tobacco: Never   Substance and Sexual Activity    Alcohol use: Yes     Alcohol/week: 1.0 standard drink of alcohol     Types: 1 Cans of beer per week    Drug use: Never   Social History Narrative    , lives alone, 2 kids.        Social History     Tobacco Use   Smoking Status Never   Smokeless Tobacco Never        Allergies as of 03/19/2024 - Reviewed 03/19/2024   Allergen Reaction Noted    Pcn [penicillins] Hives 02/26/2013          Home Medications:  Prior to Admission medications    Medication Sig Start Date End Date Taking? Authorizing Provider   apixaban (ELIQUIS) 5 mg Tab Take 1  "tablet (5 mg total) by mouth 2 (two) times daily. 2/19/24 5/19/24 Yes Anahy Lunsford, JONG       Review of Systems:  Review of Systems   Constitutional: Negative.  Negative for fatigue, fever and unexpected weight change.   HENT: Negative.  Negative for ear pain and sinus pain.    Eyes: Negative.  Negative for pain.   Respiratory: Negative.  Negative for chest tightness, shortness of breath and wheezing.    Cardiovascular: Negative.  Negative for chest pain, palpitations and leg swelling.   Gastrointestinal: Negative.  Negative for constipation, diarrhea, nausea and vomiting.   Endocrine: Negative.    Genitourinary: Negative.  Negative for difficulty urinating, dysuria, frequency and hematuria.   Musculoskeletal:  Positive for back pain (mild, better later in the day).   Skin:  Negative for rash.   Allergic/Immunologic: Negative.    Neurological: Negative.  Negative for dizziness, weakness and headaches.   Hematological: Negative.    Psychiatric/Behavioral: Negative.  Negative for agitation and confusion.        Health Maintainence:   Immunizations:  Health Maintenance         Date Due Completion Date    Pneumococcal Vaccines (Age 0-64) (1 of 2 - PCV) Never done ---    HIV Screening Never done ---    TETANUS VACCINE Never done ---    Hemoglobin A1c (Diabetic Prevention Screening) 06/13/2010 6/13/2007    Shingles Vaccine (1 of 2) Never done ---    RSV Vaccine (Age 60+ and Pregnant patients) (1 - 1-dose 60+ series) Never done ---    Influenza Vaccine (1) Never done ---    COVID-19 Vaccine (3 - 2023-24 season) 09/01/2023 4/9/2021    Colorectal Cancer Screening 04/23/2024 4/23/2021    Lipid Panel 03/03/2028 3/3/2023             PHYSICAL EXAM     /74 (BP Location: Right arm, Patient Position: Sitting, BP Method: Large (Manual))   Pulse 79   Ht 5' 9" (1.753 m)   Wt 88 kg (194 lb)   SpO2 97%   BMI 28.65 kg/m²  Body mass index is 28.65 kg/m².    Physical Exam  Constitutional:       Appearance: Normal " appearance. He is normal weight.   HENT:      Head: Normocephalic.      Right Ear: Tympanic membrane, ear canal and external ear normal.      Left Ear: Tympanic membrane, ear canal and external ear normal.      Mouth/Throat:      Mouth: Mucous membranes are moist.      Pharynx: Oropharynx is clear.   Eyes:      Pupils: Pupils are equal, round, and reactive to light.   Cardiovascular:      Rate and Rhythm: Normal rate and regular rhythm.      Pulses: Normal pulses.   Pulmonary:      Effort: Pulmonary effort is normal.   Abdominal:      General: Abdomen is flat. Bowel sounds are normal.      Palpations: Abdomen is soft.   Musculoskeletal:         General: Normal range of motion.      Cervical back: Normal range of motion.        Feet:    Feet:      Comments: Mild swelling to top of foot without erythema.  Skin:     General: Skin is warm and dry.   Neurological:      General: No focal deficit present.      Mental Status: He is alert.   Psychiatric:         Mood and Affect: Mood normal.             ASSESSMENT/PLAN     Suraj Constantino is a 64 y.o. male    ASSESSMENT AND PLAN:  1. Annual exam  2. Positive lupus anticoagulant - with recent pulmonary embolus with History of stroke and Pulmonary embolus- continue Eliquis lifelong  3. Elevated PSA- to have MRI prostate today.  If needs prostate biopsy, will have to be deferred until August 2024.   4. Foot pain - declines surgery  5.  Fatigue - to sleep medicine.   6. Erectile dysfuction - tadalafil as needed  Colonoscopy normal 4/2021 due 3 years due to incomplete prep - 4/2024- have to defer due to recent pulmonary embolus.   I will see him back in 4 months.

## 2024-03-22 ENCOUNTER — LAB VISIT (OUTPATIENT)
Dept: LAB | Facility: HOSPITAL | Age: 64
End: 2024-03-22
Attending: STUDENT IN AN ORGANIZED HEALTH CARE EDUCATION/TRAINING PROGRAM
Payer: COMMERCIAL

## 2024-03-22 ENCOUNTER — OFFICE VISIT (OUTPATIENT)
Dept: HEMATOLOGY/ONCOLOGY | Facility: CLINIC | Age: 64
End: 2024-03-22
Payer: COMMERCIAL

## 2024-03-22 ENCOUNTER — TELEPHONE (OUTPATIENT)
Dept: HEMATOLOGY/ONCOLOGY | Facility: CLINIC | Age: 64
End: 2024-03-22
Payer: COMMERCIAL

## 2024-03-22 VITALS
HEART RATE: 69 BPM | WEIGHT: 191.13 LBS | OXYGEN SATURATION: 94 % | DIASTOLIC BLOOD PRESSURE: 80 MMHG | BODY MASS INDEX: 28.31 KG/M2 | HEIGHT: 69 IN | SYSTOLIC BLOOD PRESSURE: 132 MMHG

## 2024-03-22 DIAGNOSIS — Z86.711 HX OF PULMONARY EMBOLUS: ICD-10-CM

## 2024-03-22 DIAGNOSIS — D68.59 THROMBOPHILIA: Primary | ICD-10-CM

## 2024-03-22 DIAGNOSIS — D68.59 THROMBOPHILIA: ICD-10-CM

## 2024-03-22 DIAGNOSIS — Z79.01 ANTICOAGULATED: ICD-10-CM

## 2024-03-22 PROCEDURE — 99205 OFFICE O/P NEW HI 60 MIN: CPT | Mod: S$GLB,,, | Performed by: STUDENT IN AN ORGANIZED HEALTH CARE EDUCATION/TRAINING PROGRAM

## 2024-03-22 PROCEDURE — 85303 CLOT INHIBIT PROT C ACTIVITY: CPT | Performed by: STUDENT IN AN ORGANIZED HEALTH CARE EDUCATION/TRAINING PROGRAM

## 2024-03-22 PROCEDURE — 99999 PR PBB SHADOW E&M-EST. PATIENT-LVL III: CPT | Mod: PBBFAC,,, | Performed by: STUDENT IN AN ORGANIZED HEALTH CARE EDUCATION/TRAINING PROGRAM

## 2024-03-22 PROCEDURE — 81240 F2 GENE: CPT | Performed by: STUDENT IN AN ORGANIZED HEALTH CARE EDUCATION/TRAINING PROGRAM

## 2024-03-22 PROCEDURE — 85300 ANTITHROMBIN III ACTIVITY: CPT | Performed by: STUDENT IN AN ORGANIZED HEALTH CARE EDUCATION/TRAINING PROGRAM

## 2024-03-22 PROCEDURE — 3075F SYST BP GE 130 - 139MM HG: CPT | Mod: CPTII,S$GLB,, | Performed by: STUDENT IN AN ORGANIZED HEALTH CARE EDUCATION/TRAINING PROGRAM

## 2024-03-22 PROCEDURE — 3079F DIAST BP 80-89 MM HG: CPT | Mod: CPTII,S$GLB,, | Performed by: STUDENT IN AN ORGANIZED HEALTH CARE EDUCATION/TRAINING PROGRAM

## 2024-03-22 PROCEDURE — 85240 CLOT FACTOR VIII AHG 1 STAGE: CPT | Performed by: STUDENT IN AN ORGANIZED HEALTH CARE EDUCATION/TRAINING PROGRAM

## 2024-03-22 PROCEDURE — 85306 CLOT INHIBIT PROT S FREE: CPT | Performed by: STUDENT IN AN ORGANIZED HEALTH CARE EDUCATION/TRAINING PROGRAM

## 2024-03-22 PROCEDURE — 1159F MED LIST DOCD IN RCRD: CPT | Mod: CPTII,S$GLB,, | Performed by: STUDENT IN AN ORGANIZED HEALTH CARE EDUCATION/TRAINING PROGRAM

## 2024-03-22 PROCEDURE — 3008F BODY MASS INDEX DOCD: CPT | Mod: CPTII,S$GLB,, | Performed by: STUDENT IN AN ORGANIZED HEALTH CARE EDUCATION/TRAINING PROGRAM

## 2024-03-22 NOTE — PROGRESS NOTES
Hematology- Oncology Clinic Note :     3/22/2024    Chief Complaint   Patient presents with    New Patient     Presents to the clinic today for a NP appointment    PE    Anticoagulation         HPI    Pt is a 63 y.o. man with pmhx of past medical history of Acute pulmonary emboli, DDD, lumbar, Elevated PSA, Pulmonary embolism, and Stroke who was admitted on 2/08/24 with multiple R sided pulmonary emboli.  Ultrasound lower extremity with no evidence of DVT.  He was started on heparin gtt and admitted to ICU with concerns for R heart strain. TTE completed and shows normal R heart function.  Transitioned to Eliquis 10 mg b.i.d. for 7 days followed by 5 mg b.i.d. lifelong. Pt had follow up in heme and cardiology clinics outpt.      Since discharge SOB has resolved and pt has been compliant with eliquis.  Pt informed that he will need lifelong anticoagulation and will complete thrombophilia workup today.  Pt agreeable to workup and all questions answered.    Active Problem List with Overview Notes    Diagnosis Date Noted    Elevated PSA 02/05/2024    DDD (degenerative disc disease), lumbar 04/14/2023    Lumbar spondylosis 04/14/2023    Acute pulmonary emboli 01/01/2017    Decreased peripheral vision of left eye 01/01/2017       Patient Active Problem List    Diagnosis Date Noted    Elevated PSA 02/05/2024    DDD (degenerative disc disease), lumbar 04/14/2023    Lumbar spondylosis 04/14/2023    Acute pulmonary emboli 01/01/2017    Decreased peripheral vision of left eye 01/01/2017     Past Medical History:   Diagnosis Date    Acute pulmonary emboli 01/01/2017    DDD (degenerative disc disease), lumbar 04/14/2023    Elevated PSA 02/05/2024    Pulmonary embolism     Stroke       Past Surgical History:   Procedure Laterality Date    COLONOSCOPY N/A 4/23/2021    Procedure: COLONOSCOPY;  Surgeon: Ranjith Garcia MD;  Location: [facility] ([address] [place] [address]);  Service: Endoscopy;  Laterality: N/A;  positive covid 2/12/21-BB    SURGICAL  REMOVAL OF LYMPH NODE  1989    right arm      (Not in a hospital admission)    Review of patient's allergies indicates:   Allergen Reactions    Pcn [penicillins] Hives      Social History     Tobacco Use    Smoking status: Never    Smokeless tobacco: Never   Substance Use Topics    Alcohol use: Yes     Alcohol/week: 1.0 standard drink of alcohol     Types: 1 Cans of beer per week      Family History   Problem Relation Age of Onset    Heart disease Father         Review of Systems :  Review of Systems   Constitutional:  Negative for fever, malaise/fatigue and weight loss.   HENT:  Negative for congestion, hearing loss and nosebleeds.    Eyes:  Negative for blurred vision and pain.   Respiratory:  Negative for cough, sputum production and shortness of breath.    Cardiovascular:  Negative for chest pain and leg swelling.   Gastrointestinal:  Negative for constipation and heartburn.   Genitourinary:  Negative for dysuria and hematuria.   Musculoskeletal:  Negative for back pain, joint pain and myalgias.   Skin:  Negative for itching and rash.   Neurological:  Negative for dizziness.   Endo/Heme/Allergies:  Does not bruise/bleed easily.   Psychiatric/Behavioral:  Negative for depression. The patient is not nervous/anxious.        Physical Exam :  Wt Readings from Last 3 Encounters:   03/22/24 86.7 kg (191 lb 2.2 oz)   03/19/24 88 kg (194 lb)   03/12/24 88.6 kg (195 lb 5.2 oz)     Temp Readings from Last 3 Encounters:   02/08/24 98 °F (36.7 °C) (Oral)   04/23/21 98.2 °F (36.8 °C) (Temporal)   01/24/21 98.2 °F (36.8 °C) (Oral)     BP Readings from Last 3 Encounters:   03/22/24 132/80   03/19/24 102/74   03/12/24 110/79     Pulse Readings from Last 3 Encounters:   03/22/24 69   03/19/24 79   03/12/24 76     Body mass index is 28.23 kg/m².    Physical Exam  Vitals reviewed.   Constitutional:       Appearance: Normal appearance.   HENT:      Head: Normocephalic and atraumatic.      Nose: Nose normal.      Mouth/Throat:       Mouth: Mucous membranes are moist.   Eyes:      Pupils: Pupils are equal, round, and reactive to light.   Cardiovascular:      Rate and Rhythm: Normal rate and regular rhythm.      Heart sounds: Normal heart sounds.   Pulmonary:      Effort: Pulmonary effort is normal.      Breath sounds: Normal breath sounds.   Abdominal:      General: Abdomen is flat.   Musculoskeletal:         General: Normal range of motion.      Cervical back: Normal range of motion and neck supple.   Skin:     General: Skin is warm and dry.   Neurological:      Mental Status: He is alert. Mental status is at baseline.   Psychiatric:         Mood and Affect: Mood normal.         Behavior: Behavior normal.           Pertinent Diagnostic studies:    No results found for this or any previous visit (from the past 24 hour(s)).    Assessment/Plan :           Acute pulmonary emboli on 2/08/24  -Patient had previous clot 6 years ago, but stopped AC after 90 days  -DVT/PE Hypercoagulable Workup is in process  -Lower extremity US negative for DVT  -As second clot continue anticoagulation lifelong  -Initial hypercoag testing negative, will complete workup today   -Would defer elective procedures as ideally we would continue anticoagulation uninterrupted for at least 3 months after last clot  -RTC in 5 weeks for MD visit and results of remaining thrombophilia testing        Elevated PSA  -Patient has been seeing a steady rise over past 4-5 years.  -Following with urology,has bx planned  -From heme perspective would wait 3 months post clot on anticoagulation before holding for procedure.    -he should complete 3 months of anticoagulation by may 5th as he ws dx and started on blood thinners on 2/05/24.      Time spent on case: 60 minutes    Summary of orders placed this encounter:  Orders Placed This Encounter   Procedures    Antithrombin III    Protein C Activity    Protein S Activity    Prothrombin [id number] Mutation    Factor 8 Assay       Future  Appointments   Date Time Provider Department Center   3/22/2024  4:30 PM LAB, [facility]   4/26/2024  3:30 PM Mary Gutierrez MD [facility] HEM [facility]C [facility]   5/27/2024  3:00 PM Angelica Correia PA-C [facility] SLEEP [facility]   7/26/2024  3:30 PM Corinne Ramirez MD [facility]           Mary Gutierrez MD   Hematology/oncology, [facility]

## 2024-03-22 NOTE — TELEPHONE ENCOUNTER
Returned patients call in regards to his appointment today, per Dr. Gutierrez it is ok for patient to come in at 345pm.

## 2024-03-22 NOTE — TELEPHONE ENCOUNTER
"----- Message from Sarah Perry sent at 3/22/2024  8:19 AM CDT -----  Consult/Advisory:      Name Of Caller: Self    Contact Preference:  [phone]      What is the nature of the call?: Pt would like to know if he can come in @ 4pm today for apt. Please advised     NEW PATIENT - HEMONC ([facility]) [[id number]]      Additional Notes:  "Thank you for all that you do for our patients"          "

## 2024-03-25 ENCOUNTER — TELEPHONE (OUTPATIENT)
Dept: UROLOGY | Facility: CLINIC | Age: 64
End: 2024-03-25
Payer: COMMERCIAL

## 2024-03-25 LAB
AT III ACT/NOR PPP CHRO: 92 % (ref 83–118)
FACT VIII ACT/NOR PPP: 179 % (ref 60–170)
PROT C ACT/NOR PPP CHRO: 92 % (ref 70–150)
PROT S ACT/NOR PPP: 117 % (ref 65–150)

## 2024-03-25 NOTE — TELEPHONE ENCOUNTER
JAMIE and sent patient a portal message regarding scheduling appointment to go over results.    WINSTON Iglesias

## 2024-03-27 LAB — F2 C.20210G>A GENO BLD/T: NEGATIVE

## 2024-04-26 ENCOUNTER — OFFICE VISIT (OUTPATIENT)
Dept: HEMATOLOGY/ONCOLOGY | Facility: CLINIC | Age: 64
End: 2024-04-26
Payer: COMMERCIAL

## 2024-04-26 VITALS
WEIGHT: 191.56 LBS | HEIGHT: 69 IN | SYSTOLIC BLOOD PRESSURE: 105 MMHG | HEART RATE: 67 BPM | DIASTOLIC BLOOD PRESSURE: 70 MMHG | BODY MASS INDEX: 28.37 KG/M2 | OXYGEN SATURATION: 95 %

## 2024-04-26 DIAGNOSIS — Z79.01 ANTICOAGULATED: ICD-10-CM

## 2024-04-26 DIAGNOSIS — Z86.711 HX OF PULMONARY EMBOLUS: ICD-10-CM

## 2024-04-26 DIAGNOSIS — D68.59 THROMBOPHILIA: Primary | ICD-10-CM

## 2024-04-26 DIAGNOSIS — Z09 FOLLOW-UP EXAM: ICD-10-CM

## 2024-04-26 DIAGNOSIS — R97.20 ELEVATED PSA: ICD-10-CM

## 2024-04-26 PROCEDURE — 99999 PR PBB SHADOW E&M-EST. PATIENT-LVL III: CPT | Mod: PBBFAC,,, | Performed by: STUDENT IN AN ORGANIZED HEALTH CARE EDUCATION/TRAINING PROGRAM

## 2024-04-26 PROCEDURE — 3074F SYST BP LT 130 MM HG: CPT | Mod: CPTII,S$GLB,, | Performed by: STUDENT IN AN ORGANIZED HEALTH CARE EDUCATION/TRAINING PROGRAM

## 2024-04-26 PROCEDURE — 3008F BODY MASS INDEX DOCD: CPT | Mod: CPTII,S$GLB,, | Performed by: STUDENT IN AN ORGANIZED HEALTH CARE EDUCATION/TRAINING PROGRAM

## 2024-04-26 PROCEDURE — 3078F DIAST BP <80 MM HG: CPT | Mod: CPTII,S$GLB,, | Performed by: STUDENT IN AN ORGANIZED HEALTH CARE EDUCATION/TRAINING PROGRAM

## 2024-04-26 PROCEDURE — 1159F MED LIST DOCD IN RCRD: CPT | Mod: CPTII,S$GLB,, | Performed by: STUDENT IN AN ORGANIZED HEALTH CARE EDUCATION/TRAINING PROGRAM

## 2024-04-26 PROCEDURE — 99214 OFFICE O/P EST MOD 30 MIN: CPT | Mod: S$GLB,,, | Performed by: STUDENT IN AN ORGANIZED HEALTH CARE EDUCATION/TRAINING PROGRAM

## 2024-04-26 NOTE — PROGRESS NOTES
Hematology- Oncology Clinic Note :     4/26/2024    Chief Complaint   Patient presents with    Follow-up    Anticoagulation         HPI    Pt is a 64 y.o. man with pmhx of past medical history of Acute pulmonary emboli, DDD, lumbar, Elevated PSA, Pulmonary embolism, and Stroke who was admitted on 2/08/24 with multiple R sided pulmonary emboli.  Ultrasound lower extremity with no evidence of DVT.  He was started on heparin gtt and admitted to ICU with concerns for R heart strain. TTE completed and shows normal R heart function.  Transitioned to Eliquis 10 mg b.i.d. for 7 days followed by 5 mg b.i.d. lifelong. Pt had follow up in heme and cardiology clinics outpt.      Interval hx:    Pt presents for follow up and results and feels well at time of exam.  Coag workup negative but as pt has had 2nd clot will need lifelong anticoagulation anticoagulation.  As pt has had 3 months of initial anticoagulation we can give clearance to hold eliquis 2 days prior to prostate bx for elevated PSA.  All questions answered to his satisfaction.         Active Problem List with Overview Notes    Diagnosis Date Noted    Elevated PSA 02/05/2024    DDD (degenerative disc disease), lumbar 04/14/2023    Lumbar spondylosis 04/14/2023    Acute pulmonary emboli 01/01/2017    Decreased peripheral vision of left eye 01/01/2017       Patient Active Problem List    Diagnosis Date Noted    Elevated PSA 02/05/2024    DDD (degenerative disc disease), lumbar 04/14/2023    Lumbar spondylosis 04/14/2023    Acute pulmonary emboli 01/01/2017    Decreased peripheral vision of left eye 01/01/2017     Past Medical History:   Diagnosis Date    Acute pulmonary emboli 01/01/2017    DDD (degenerative disc disease), lumbar 04/14/2023    Elevated PSA 02/05/2024    Pulmonary embolism     Stroke       Past Surgical History:   Procedure Laterality Date    COLONOSCOPY N/A 4/23/2021    Procedure: COLONOSCOPY;  Surgeon: Ranjith Garcia MD;  Location: [facility] ([facility]);  Service: Endoscopy;  Laterality: N/A;  positive covid 2/12/21-BB    SURGICAL REMOVAL OF LYMPH NODE  1989    right arm      (Not in a hospital admission)    Review of patient's allergies indicates:   Allergen Reactions    Pcn [penicillins] Hives      Social History     Tobacco Use    Smoking status: Never    Smokeless tobacco: Never   Substance Use Topics    Alcohol use: Yes     Alcohol/week: 1.0 standard drink of alcohol     Types: 1 Cans of beer per week      Family History   Problem Relation Name Age of Onset    Heart disease Father          Review of Systems :  Review of Systems   Constitutional:  Negative for fever, malaise/fatigue and weight loss.   HENT:  Negative for congestion, hearing loss and nosebleeds.    Eyes:  Negative for blurred vision and pain.   Respiratory:  Negative for cough, sputum production and shortness of breath.    Cardiovascular:  Negative for chest pain and leg swelling.   Gastrointestinal:  Negative for constipation and heartburn.   Genitourinary:  Negative for dysuria and hematuria.   Musculoskeletal:  Negative for back pain, joint pain and myalgias.   Skin:  Negative for itching and rash.   Neurological:  Negative for dizziness.   Endo/Heme/Allergies:  Does not bruise/bleed easily.   Psychiatric/Behavioral:  Negative for depression. The patient is not nervous/anxious.        Physical Exam :  Wt Readings from Last 3 Encounters:   04/26/24 86.9 kg (191 lb 9.3 oz)   03/22/24 86.7 kg (191 lb 2.2 oz)   03/19/24 88 kg (194 lb)     Temp Readings from Last 3 Encounters:   02/08/24 98 °F (36.7 °C) (Oral)   04/23/21 98.2 °F (36.8 °C) (Temporal)   01/24/21 98.2 °F (36.8 °C) (Oral)     BP Readings from Last 3 Encounters:   04/26/24 105/70   03/22/24 132/80   03/19/24 102/74     Pulse Readings from Last 3 Encounters:   04/26/24 67   03/22/24 69   03/19/24 79     Body mass index is 28.29 kg/m².    Physical Exam  Vitals reviewed.   Constitutional:       Appearance: Normal appearance.   HENT:       Head: Normocephalic and atraumatic.      Nose: Nose normal.      Mouth/Throat:      Mouth: Mucous membranes are moist.   Eyes:      Pupils: Pupils are equal, round, and reactive to light.   Cardiovascular:      Rate and Rhythm: Normal rate and regular rhythm.      Heart sounds: Normal heart sounds.   Pulmonary:      Effort: Pulmonary effort is normal.      Breath sounds: Normal breath sounds.   Abdominal:      General: Abdomen is flat.   Musculoskeletal:         General: Normal range of motion.      Cervical back: Normal range of motion and neck supple.   Skin:     General: Skin is warm and dry.   Neurological:      Mental Status: He is alert. Mental status is at baseline.   Psychiatric:         Mood and Affect: Mood normal.         Behavior: Behavior normal.           Pertinent Diagnostic studies:    No results found for this or any previous visit (from the past 24 hour(s)).    Assessment/Plan :           Acute pulmonary emboli on 2/08/24  -Patient had previous clot 6 years ago, but stopped AC after 90 days  -DVT/PE Hypercoagulable Workup is in process  -Lower extremity US negative for DVT  -As second clot continue anticoagulation lifelong  -Initial hypercoag testing negative, reviewed with pt  -RTC in 6 months for MD visit and CBC       Elevated PSA  -Patient has been seeing a steady rise over past 4-5 years.  -Following with urology,has bx planned  -Ok from a hematology perspective to hold eliquis 2 days prior to procedure as pt has completed initial 3 months of anticoagulation      Time spent on case: 30 minutes    Summary of orders placed this encounter:  No orders of the defined types were placed in this encounter.      Future Appointments   Date Time Provider Department Center   4/26/2024  3:30 PM Mary Gutierrez MD [facility] HEM ONC [facility]   5/27/2024  3:00 PM Angelica Correia PA-C [facility] SLEEP [facility]   7/26/2024  3:30 PM Corinne Ramirez MD [facility]           Mary Gutierrez MD    Hematology/oncology, [facility]

## 2024-05-02 ENCOUNTER — TELEPHONE (OUTPATIENT)
Dept: UROLOGY | Facility: CLINIC | Age: 64
End: 2024-05-02
Payer: COMMERCIAL

## 2024-05-02 DIAGNOSIS — R97.20 ELEVATED PSA: Primary | ICD-10-CM

## 2024-05-02 NOTE — TELEPHONE ENCOUNTER
----- Message from Tara Gupta LPN sent at 5/1/2024  8:14 AM CDT -----  Dr. Cheatham would like to have this patient see Dr. Maldonado  ----- Message -----  From: Jean Claude Cheatham MD  Sent: 4/30/2024   2:08 PM CDT  To: Linden Silverio Staff    Can you reach out to this patient later this week? I left a voicemail for him, we need to set him up with Dr. Maldonado for a UroNav biopsy.    Thanks,  Jean Claude Cheatham  ----- Message -----  From: Mary Gutierrez MD  Sent: 4/26/2024  11:54 AM CDT  To: Jean Claude Cheatham MD    Good afternoon,      Pt just completed 3 months of anticoagulation.  Due to elevated PSA he will need prostate bx and from a hematology perspective can hold eliquis 2 days prior to procedure.      Please let me know if you have any questions.      Mary Gutierrez

## 2024-05-03 RX ORDER — CIPROFLOXACIN 500 MG/1
500 TABLET ORAL 2 TIMES DAILY
Qty: 4 TABLET | Refills: 0 | Status: SHIPPED | OUTPATIENT
Start: 2024-05-03 | End: 2024-05-05

## 2024-05-03 NOTE — PROGRESS NOTES
Left voicemail notifying patient of MRI results. Will schedule UroNav biopsy with Dr. Maldonado. Rx for Cipro sent. Instructed to hold Eliquis 3 days prior to procedure, do Fleets enema morning of procedure.

## 2024-05-06 PROBLEM — I26.99 ACUTE PULMONARY EMBOLISM: Status: RESOLVED | Noted: 2017-01-01 | Resolved: 2024-05-06

## 2024-05-23 ENCOUNTER — TELEPHONE (OUTPATIENT)
Dept: UROLOGY | Facility: CLINIC | Age: 64
End: 2024-05-23
Payer: COMMERCIAL

## 2024-05-24 ENCOUNTER — TELEPHONE (OUTPATIENT)
Dept: UROLOGY | Facility: CLINIC | Age: 64
End: 2024-05-24
Payer: COMMERCIAL

## 2024-05-24 DIAGNOSIS — R97.20 ELEVATED PSA: Primary | ICD-10-CM

## 2024-05-24 RX ORDER — CIPROFLOXACIN 500 MG/1
500 TABLET ORAL 2 TIMES DAILY
Qty: 4 TABLET | Refills: 0 | Status: SHIPPED | OUTPATIENT
Start: 2024-05-24 | End: 2024-05-26

## 2024-05-24 NOTE — TELEPHONE ENCOUNTER
Returned call. Pt did not take abx. Spoke with Dr. Maldonado. Will need to cancel procedure today. Abx resent to pharmacy and pt instructed on urine collection at any [facility] lab. Pt verbalized understanding. Will have another nurse call to reschedule.     ----- Message from Trish Mcdaniels sent at 5/23/2024  4:19 PM CDT -----  Regarding: Pt returned call  Contact: [phone]  Type:  Patient Returning Call        Who Called:ADEBAYO SOLIZ [[id number]]        Who Left Message for Patient:Heydi        Does the patient know what this is regarding?Pt returned call. Adv Pt a voicemail was left states he's unable to get to Voicemail. Please advise         Best Call Back Number:Telephone Information:  Mobile          [phone]            Additional Information:

## 2024-05-27 ENCOUNTER — OFFICE VISIT (OUTPATIENT)
Dept: SLEEP MEDICINE | Facility: CLINIC | Age: 64
End: 2024-05-27
Payer: COMMERCIAL

## 2024-05-27 ENCOUNTER — TELEPHONE (OUTPATIENT)
Dept: UROLOGY | Facility: CLINIC | Age: 64
End: 2024-05-27
Payer: COMMERCIAL

## 2024-05-27 VITALS — BODY MASS INDEX: 28.08 KG/M2 | HEIGHT: 69 IN | WEIGHT: 189.63 LBS

## 2024-05-27 DIAGNOSIS — R06.83 SNORING: ICD-10-CM

## 2024-05-27 DIAGNOSIS — R53.83 FATIGUE, UNSPECIFIED TYPE: ICD-10-CM

## 2024-05-27 DIAGNOSIS — G47.10 HYPERSOMNOLENCE: Primary | ICD-10-CM

## 2024-05-27 DIAGNOSIS — F51.09 OTHER INSOMNIA NOT DUE TO A SUBSTANCE OR KNOWN PHYSIOLOGICAL CONDITION: ICD-10-CM

## 2024-05-27 PROCEDURE — 1159F MED LIST DOCD IN RCRD: CPT | Mod: CPTII,S$GLB,, | Performed by: PHYSICIAN ASSISTANT

## 2024-05-27 PROCEDURE — 99204 OFFICE O/P NEW MOD 45 MIN: CPT | Mod: S$GLB,,, | Performed by: PHYSICIAN ASSISTANT

## 2024-05-27 PROCEDURE — 3008F BODY MASS INDEX DOCD: CPT | Mod: CPTII,S$GLB,, | Performed by: PHYSICIAN ASSISTANT

## 2024-05-27 PROCEDURE — 99999 PR PBB SHADOW E&M-EST. PATIENT-LVL III: CPT | Mod: PBBFAC,,, | Performed by: PHYSICIAN ASSISTANT

## 2024-05-27 PROCEDURE — 1160F RVW MEDS BY RX/DR IN RCRD: CPT | Mod: CPTII,S$GLB,, | Performed by: PHYSICIAN ASSISTANT

## 2024-05-27 NOTE — TELEPHONE ENCOUNTER
----- Message from Carmen Busch RN sent at 5/24/2024 11:20 AM CDT -----  Regarding: FW: Pt returned call  Contact: [phone]  Previous message about uronav was actually regarding this pt.  ----- Message -----  From: Trish Mcdaniels  Sent: 5/23/2024   4:21 PM CDT  To: Joel Camargo Staff  Subject: Pt returned call                                 Type:  Patient Returning Call        Who Called:ADEBAYO SOLIZ [[id number]]        Who Left Message for Patient:Heydi        Does the patient know what this is regarding?Pt returned call. Adv Pt a voicemail was left states he's unable to get to Voicemail. Please advise         Best Call Back Number:Telephone Information:  Mobile          [phone]            Additional Information:

## 2024-05-27 NOTE — PROGRESS NOTES
Referred by Corinne Ramirez MD     NEW PATIENT VISIT    Suraj Constantino  is a pleasant 64 y.o. male  with PMH significant for hx multiple PEs (, ), hx CVA (), DDD , BPH who presents for sleep evaluation following referral from PCP      C/o poor un-refreshing sleep, excessive daytime sleepiness and fatigue, and accidental dozing throughout the day. States he is constantly tired. Does endorse some occasional snoring, reports rarely when he is excessively exhausted. Denies witnessed apneas. Denies dream enactment behaviors. States his PCP recommended evaluation for LOTTIE, which is why he presents today    SLEEP SCHEDULE   Environment    Bed Time 10:30 watches the news in bed  Tries to be asleep for MN   Sleep Latency Not long once he turns off the tv   Arousals 0   Nocturia 0   Back to sleep    Wake time 5AM   Naps    Work        Past Medical History:   Diagnosis Date    Acute pulmonary emboli 2017    DDD (degenerative disc disease), lumbar 2023    Elevated PSA 2024    Pulmonary embolism     Stroke      Patient Active Problem List   Diagnosis    Decreased peripheral vision of left eye    DDD (degenerative disc disease), lumbar    Lumbar spondylosis    Elevated PSA       Current Outpatient Medications:     tadalafiL (CIALIS) 20 MG Tab, Take 1 tablet (20 mg total) by mouth daily as needed., Disp: 30 tablet, Rfl: 2     There were no vitals filed for this visit.    Physical Exam:    GEN:   Well-appearing  Psych:  Appropriate affect, demonstrates insight  SKIN:  No rash on the face or bridge of the nose      LABS:   Lab Results   Component Value Date    HGB 14.1 2024    CO2 26 2024         RECORDS REVIEWED:    No previous sleep study    ASSESSMENT    [place] Sleepiness Scale:  Sitting and readin  Watching TV:    3  Passenger in a car x 1 hr:  2  Sitting quietly after lunch:  2  Lying down to rest in PM:  2  Sitting, inactive in public:  1  Sitting+ talking to someone:   0  Stopped in traffic:   3  Total    15/24    PROBLEM DESCRIPTION/ Sx on Presentation  STATUS   Sx LOTTIE   + rare snoring, denies witnessed apneas  + wakes feeling un-refreshed   New   Daytime Sx   + sleepiness when inactive   ESS 15/24 on intake  New   Insomnia   Trouble falling asleep: 2 hours   Arousals:         0  Hard to get back to sleep?:     Prior pertinent medications:  Current pertinent medications:   New   Nocturia   x 0 per sleep period  New   Other issues:       PLAN      -recommend sleep testing  -HST ordered  -discussed trial therapy if LOTTIE present and the patient is open to a trial of CPAP therapy  -discussed LOTTIE and PAP with patient in detail, including possible complications of untreated LOTTIE like heart attack/stroke  -advised on strict driving precautions; advised never to drive drowsy  -discussed recommended total sleep time of 7-9 hours for adult, with sleep time <6hours linked to negative health outcomes long-term  -discussed sleep hygiene with patient    Advised on plan of care. Answered all patient questions. Patient verbalized understanding and voiced agreement with plan of care.     RTC if dx of LOTTIE made and CPAP ordered, will need follow up 31-90 days after receiving machine for compliance         The patient was given open opportunity to ask questions and/or express concerns about treatment plan. All questions/concerns were discussed.     Two patient identifiers used prior to evaluation.

## 2024-06-10 ENCOUNTER — PATIENT MESSAGE (OUTPATIENT)
Dept: INTERNAL MEDICINE | Facility: CLINIC | Age: 64
End: 2024-06-10
Payer: COMMERCIAL

## 2024-06-10 ENCOUNTER — TELEPHONE (OUTPATIENT)
Dept: SLEEP MEDICINE | Facility: OTHER | Age: 64
End: 2024-06-10
Payer: COMMERCIAL

## 2024-06-12 ENCOUNTER — TELEPHONE (OUTPATIENT)
Dept: SLEEP MEDICINE | Facility: OTHER | Age: 64
End: 2024-06-12
Payer: COMMERCIAL

## 2024-06-21 ENCOUNTER — PATIENT MESSAGE (OUTPATIENT)
Dept: UROLOGY | Facility: CLINIC | Age: 64
End: 2024-06-21
Payer: COMMERCIAL

## 2024-06-21 ENCOUNTER — TELEPHONE (OUTPATIENT)
Dept: UROLOGY | Facility: CLINIC | Age: 64
End: 2024-06-21
Payer: COMMERCIAL

## 2024-06-28 ENCOUNTER — TELEPHONE (OUTPATIENT)
Dept: SLEEP MEDICINE | Facility: OTHER | Age: 64
End: 2024-06-28
Payer: COMMERCIAL

## 2024-07-05 ENCOUNTER — PROCEDURE VISIT (OUTPATIENT)
Dept: UROLOGY | Facility: CLINIC | Age: 64
End: 2024-07-05
Payer: COMMERCIAL

## 2024-07-05 ENCOUNTER — TELEPHONE (OUTPATIENT)
Dept: UROLOGY | Facility: CLINIC | Age: 64
End: 2024-07-05
Payer: COMMERCIAL

## 2024-07-05 VITALS
TEMPERATURE: 97 F | RESPIRATION RATE: 17 BRPM | WEIGHT: 179.88 LBS | SYSTOLIC BLOOD PRESSURE: 118 MMHG | BODY MASS INDEX: 26.57 KG/M2 | DIASTOLIC BLOOD PRESSURE: 74 MMHG | HEART RATE: 59 BPM

## 2024-07-05 DIAGNOSIS — R97.20 ELEVATED PSA: ICD-10-CM

## 2024-07-05 RX ORDER — LIDOCAINE HYDROCHLORIDE 20 MG/ML
JELLY TOPICAL
Status: COMPLETED | OUTPATIENT
Start: 2024-07-05 | End: 2024-07-05

## 2024-07-05 RX ORDER — CEFTRIAXONE 1 G/1
1 INJECTION, POWDER, FOR SOLUTION INTRAMUSCULAR; INTRAVENOUS
Status: COMPLETED | OUTPATIENT
Start: 2024-07-05 | End: 2024-07-05

## 2024-07-05 RX ORDER — LIDOCAINE HYDROCHLORIDE 10 MG/ML
20 INJECTION INFILTRATION; PERINEURAL
Status: COMPLETED | OUTPATIENT
Start: 2024-07-05 | End: 2024-07-05

## 2024-07-05 RX ADMIN — CEFTRIAXONE 1 G: 1 INJECTION, POWDER, FOR SOLUTION INTRAMUSCULAR; INTRAVENOUS at 02:07

## 2024-07-05 RX ADMIN — LIDOCAINE HYDROCHLORIDE 20 ML: 10 INJECTION INFILTRATION; PERINEURAL at 02:07

## 2024-07-05 RX ADMIN — LIDOCAINE HYDROCHLORIDE: 20 JELLY TOPICAL at 02:07

## 2024-07-05 NOTE — TELEPHONE ENCOUNTER
----- Message from Raman Maldonado MD sent at 7/5/2024  2:42 PM CDT -----  See Dr Cheatham 2 weeks to discuss path results please

## 2024-07-05 NOTE — PROCEDURES
"Transrectal ultrasound w/ biopsy    Date/Time: 7/5/2024 2:40 PM    Performed by: Raman Maldonado MD  Authorized by: Jean Claude Cheatham MD    Consent Done?:  Yes (Written)  Time out: Immediately prior to procedure a "time out" was called to verify the correct patient, procedure, equipment, support staff and site/side marked as required.    Indications: Prostate Nodules and Elevated PSA    Preparation: Patient was prepped and draped in usual sterile fashion    Position:  Left lateral  Anesthesia:  Pudendal nerve block, Lidocaine jelly and 20cc's 1% Lidocaine  Patient sedated: No    Prostate Size:  21  Lesions:: No    Left Base Biopsies: 2  Left Mid Biopsies: 2  Left [place] Biopsies: 2  Right Base Biopsies: 2  Right Mid Biopsies: 2  Right [place] Biopsies: 2  Total Biopsies:  12    Patient tolerance:  Patient tolerated the procedure well with no immediate complications     MR and US imaged segmented and co-registered with uronav    4 additional cores taken from target for total of 16 cores  Co-registration did not correlate well.  Systematic cores from right base and mid sampled the area of PIRADS5 lesion     Urine cs neg  cipro and enemas and Rocephin done      Standard instructions given  RTC 2 weeks to discuss pathology results with Dr Cheatham          "

## 2024-07-05 NOTE — PATIENT INSTRUCTIONS
What to Expect After a Prostate Biopsy    You may have mild bleeding from the rectum or urine for about 1 week to 1 month, or in your ejaculate for several months. This bleeding is normal and expected, and it will stop. You may have mild discomfort in your rectal or urethral area for 24-48 hours.    You cannot do any strenuous lifting, straining, or exercising for 24 hours. You may return to full activity the day after the biopsy.    You may continue to take all your regular medications after the procedure except for the blood thinners.    You may resume all blood-thinning medications once you no longer see any bleeding or whenever your physician prescribing the medication says it is all right to do so. You may take Tylenol if you have a fever and your temperature is less than 100° F or if you have some discomfort.    You will receive a call from the Urology Department at [facility] with the results of your prostate biopsy within one week.    Signs and Symptoms to Report    Call your [facility] urologist at [phone] if you develop any of the following:  Temperature greater than 101°  F  Inability to urinate  A large amount of bleeding from the rectum or in the urine  Persistent or severe pain    After hours or on weekends, you may reach a urology resident on call at this number: [phone].

## 2024-07-26 ENCOUNTER — OFFICE VISIT (OUTPATIENT)
Dept: INTERNAL MEDICINE | Facility: CLINIC | Age: 64
End: 2024-07-26
Payer: COMMERCIAL

## 2024-07-26 VITALS
WEIGHT: 184.94 LBS | BODY MASS INDEX: 27.39 KG/M2 | HEART RATE: 56 BPM | DIASTOLIC BLOOD PRESSURE: 66 MMHG | SYSTOLIC BLOOD PRESSURE: 108 MMHG | HEIGHT: 69 IN

## 2024-07-26 DIAGNOSIS — M19.072 ARTHRITIS OF LEFT FOOT: ICD-10-CM

## 2024-07-26 DIAGNOSIS — Z86.73 HISTORY OF CVA (CEREBROVASCULAR ACCIDENT): ICD-10-CM

## 2024-07-26 DIAGNOSIS — C61 PROSTATE CANCER: Primary | ICD-10-CM

## 2024-07-26 DIAGNOSIS — D68.9 CLOTTING DISORDER: ICD-10-CM

## 2024-07-26 PROBLEM — I26.94 MULTIPLE SUBSEGMENTAL PULMONARY EMBOLI WITHOUT ACUTE COR PULMONALE: Status: ACTIVE | Noted: 2017-01-01

## 2024-07-26 PROBLEM — R97.20 ELEVATED PSA: Status: RESOLVED | Noted: 2024-02-05 | Resolved: 2024-07-26

## 2024-07-26 PROCEDURE — 99999 PR PBB SHADOW E&M-EST. PATIENT-LVL IV: CPT | Mod: PBBFAC,,, | Performed by: INTERNAL MEDICINE

## 2024-07-26 RX ORDER — CIPROFLOXACIN 500 MG/1
500 TABLET ORAL 2 TIMES DAILY
COMMUNITY
Start: 2024-07-01 | End: 2024-07-26

## 2024-07-26 RX ORDER — GABAPENTIN 100 MG/1
CAPSULE ORAL
Qty: 90 CAPSULE | Refills: 11 | Status: SHIPPED | OUTPATIENT
Start: 2024-07-26

## 2024-07-26 NOTE — Clinical Note
Dr Chavez, Mr Constantino has been diagnosed with prostate cancer on biopsy. He does not want to follow up with Dr Cheatham at [facility]. Can you see him in clinic for further evaluation for management of his prostate cancer? Thanks  Corinne

## 2024-07-26 NOTE — PROGRESS NOTES
Chief Complaint- follow up of multilple issue    HPI- this is a 64 year old man who present for follow up of multiple issues.     He had prostate biopsies on 7/5/24 - prostate adenocarcinoma on 5 out of 7 biopsies. He does not want to see DR Cheatham in urolgy for follow up of this issue.     He continues to take Eliquis 5 mg po twice daily for coagulation disorder and pulmonary embolus. No chest pain, shortness of breath, palpitations. .      He is working in [employer] at the [employer] - in [facility].      He continues to have an intermittent aching pain (6-8/10) in the great toe of the left foot for years. He is now having more left foot pain.  He climbs ladders and steps daily for work.  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    His sister has cutaneous lupus. Mother had a blood clot at age 85 after a long airplane flight. Brother age 68 just had a couple strokes and is now in a wheelchair.  He was drinker.          Vision is better. Hearing is getting worse. Hearing test on job last week and reports hearing test results have not changed.    He has had trouble sleeping - saw sleep medicine 5/27/24 - has not had a sleep study       REVIEW OF SYSTEMS: No fevers, chills, night sweats, fatigue, visual change, hearing loss, sinus congestion, sore throat, chest pain, shortness of breath, nausea, vomiting, constipation, diarrhea, dysuria, hematuria, polydipsia, polyuria, joint pain, muscle pain, headaches, anxiety, depression, insomnia.         PAST MEDICAL HISTORY:  1. Acute stroke in the left frontoparietal region, June 2007. Symptoms resolved upon discharge from the hospital. MRI was consistent with acute stroke.   2. Suggested carrier for factor V Leiden mutation in June 2007 at the time of his stroke. HE had a low activated protein C level at 1.8 with negative gene mutation.  3. Lymph node biopsy in 1989 that was benign.  4. History of leukopenia. He reports he had a  "workup at [facility] that was negative.  5. Pulmonary embolus Jan 2017.  6. Bilateral pulmonary embolus February 2024     Past Surgical History:   Procedure Laterality Date    COLONOSCOPY N/A 4/23/2021    Procedure: COLONOSCOPY;  Surgeon: Ranjith Garcia MD;  Location: [facility] ([address] [place] [address]);  Service: Endoscopy;  Laterality: N/A;  positive covid 2/12/21-BB    SURGICAL REMOVAL OF LYMPH NODE  1989    right arm         MEDS AND ALLERGIES: Updated on EPIC   He does not smoke. . He is  with 3 children. He works in a refinery as a .       PHYSICAL EXAM:    /66 (BP Location: Right arm, Patient Position: Sitting, BP Method: Large (Automatic))   Pulse (!) 56   Ht 5' 9" (1.753 m)   Wt 83.9 kg (184 lb 15.5 oz)   BMI 27.31 kg/m²     GENERAL: Alert, oriented. No apparent distress. Affect within normal  limits. Conjunctivae anicteric. Tympanic membranes clear. Oropharynx  clear.  NECK: Supple. Respiratory effort normal. Lungs clear to auscultation.  HEART: Regular rate and rhythm without murmurs, gallops, or rubs. No lower  extremity edema.  ABDOMEN: Soft, nondistended, nontender. Bowel sounds present. No  hepatosplenomegaly.         ASSESSMENT/PLAN      Suraj Constantino is a 64 y.o. male     ASSESSMENT AND PLAN:  1. Prostate cancer - to Dr Chavez for further evaluation and management    2. Clotting disorder  recent pulmonary embolus 2/2024 with History of stroke and Pulmonary embolus- continue Eliquis lifelong  3. Foot pain - try gabapentin 100 mg - 1 -3 at bedtime for foot pain  5.  Fatigue - saw sleep medicine.   6. Erectile dysfuction -taladafil   Colonoscopy normal 4/2021 due 3 years due to incomplete prep - 4/2024- have to defer due to recent pulmonary embolus.   I will see him back in 4 months.   "

## 2024-07-30 ENCOUNTER — TELEPHONE (OUTPATIENT)
Dept: UROLOGY | Facility: CLINIC | Age: 64
End: 2024-07-30
Payer: COMMERCIAL

## 2024-07-31 ENCOUNTER — PATIENT MESSAGE (OUTPATIENT)
Dept: UROLOGY | Facility: CLINIC | Age: 64
End: 2024-07-31
Payer: COMMERCIAL

## 2024-07-31 ENCOUNTER — TELEPHONE (OUTPATIENT)
Dept: UROLOGY | Facility: CLINIC | Age: 64
End: 2024-07-31
Payer: COMMERCIAL

## 2024-07-31 NOTE — TELEPHONE ENCOUNTER
Attempted to call patient re: rescheduling Dr Chavez clinic appt.   Msg left on VM with return contact information provided.

## 2024-08-01 ENCOUNTER — TELEPHONE (OUTPATIENT)
Dept: UROLOGY | Facility: CLINIC | Age: 64
End: 2024-08-01
Payer: COMMERCIAL

## 2024-08-01 NOTE — TELEPHONE ENCOUNTER
Attempted to return patient call, per patient call back message, patient wants appt to be on 8/15 after 12 pm, but stated he does not want appointments moved without speaking to him, so appt remains on 8/8 until he can be reached.

## 2024-08-01 NOTE — TELEPHONE ENCOUNTER
Attempted to return call to office re rescheduling appt with Dr Chavez.  VM message left with return call information provided.

## 2024-08-02 ENCOUNTER — TELEPHONE (OUTPATIENT)
Dept: UROLOGY | Facility: CLINIC | Age: 64
End: 2024-08-02
Payer: COMMERCIAL

## 2024-08-02 NOTE — TELEPHONE ENCOUNTER
Spoke with patient who was able to provide acceptable patient identifiers prior to start of conversation.   Appt with Dr Chavez rescheduled for patient.

## 2024-08-14 NOTE — PROGRESS NOTES
Clinic Note  8/14/2024      Subjective:         Chief Complaint:   YONIS Constantino is a 64 y.o. male diagnosed with prostate cancer.  Co-morbidities- thrombophilia needing lifelong anticoagulation, CVA, vickie PE. Consult from Dr. Ramirez.  Works at [employer] as a [employer]. Cassie, his wife, is a nurse.    FH - negative  PSA - 6.5  PSAD-0.28  AJCC Stage - T1C?  STAR CAP stage- T1B  Volume - 23 ccs  MRI - 3/19/2024- L1-1.5 cm RMTZ PI-RADS 5.MRI negative for EPE (extraprostatic extension), NVBI (neurovascular bundle involvement), SVI (seminal vesicle involvement), or nodes.   Biopsy - 7/5/2024- UroNav- [place] 3+3 (GG2) left apex 1/2 6%, right apex 1/2 16%, right middle 1/2 7%, right base 1/2 9%, target 1/4 4%. Overall 5/7 sites, 5/16 cores.  RHIANNON Score - 2 low risk  NCCN - low risk  Germline testing -not indicated  Somatic testing - discussed, ordered  STAR CAP-      Lab Results   Component Value Date    PSA 5.2 (H) 03/03/2023    PSA 2.6 02/12/2021    PSA 1.2 06/13/2019    PSA 0.84 05/28/2011    PSA 0.53 02/19/2009    PSATOTAL 6.5 (H) 02/05/2024    PSAFREE 0.38 02/05/2024    PSAFREEPCT 5.85 02/05/2024      Past Medical History:   Diagnosis Date    Acute pulmonary emboli 01/01/2017    DDD (degenerative disc disease), lumbar 04/14/2023    Elevated PSA 02/05/2024    Pulmonary embolism     Stroke      Family History   Problem Relation Name Age of Onset    Heart disease Father       Social History     Socioeconomic History    Marital status:    Occupational History     Employer: [employer]   Tobacco Use    Smoking status: Never    Smokeless tobacco: Never   Substance and Sexual Activity    Alcohol use: Yes     Alcohol/week: 1.0 standard drink of alcohol     Types: 1 Cans of beer per week    Drug use: Never   Social History Narrative    , lives alone, 2 kids.     Past Surgical History:   Procedure Laterality Date    COLONOSCOPY N/A 4/23/2021    Procedure: COLONOSCOPY;  Surgeon: Ranjith Garcia MD;   "Location: [facility] (4TH FLR);  Service: Endoscopy;  Laterality: N/A;  positive covid 2/12/21-BB    SURGICAL REMOVAL OF LYMPH NODE  1989    right arm     Patient Active Problem List   Diagnosis    Multiple subsegmental pulmonary emboli without acute cor pulmonale    Decreased peripheral vision of left eye    DDD (degenerative disc disease), lumbar    Lumbar spondylosis    Prostate cancer    Clotting disorder    Arthritis of left foot    History of CVA (cerebrovascular accident)     Review of Systems      Objective:      There were no vitals taken for this visit.  Estimated body mass index is 27.31 kg/m² as calculated from the following:    Height as of 7/26/24: 5' 9" (1.753 m).    Weight as of 7/26/24: 83.9 kg (184 lb 15.5 oz).  Physical Exam      Assessment and Plan:           Problem List Items Addressed This Visit       Multiple subsegmental pulmonary emboli without acute cor pulmonale    Prostate cancer - Primary    History of CVA (cerebrovascular accident)       Follow up:   Today's visit was spent almost entirely on counseling. We reviewed his diagnosis, stage, grade, risk group, and prognosis. We discussed D'[place] (NCCN) and RHIANNON risk stratification  We discussed the concept of low risk, intermediate risk, and high risk disease. We also reviewed the NCCN treatment nomogram.We discussed the different treatment options including active surveillance (as well as the surveillance protocol), prostate brachytherapy, EBRT, SBRT (stereotactic body radiation therapy),cryotherapy, HIFU and both open and robotic prostatectomy.We also discussed the advantages, disadvantages, risks and benefits, as well as complications of each option. Regarding radiation therapy we discussed treatment planning, the different techniques, short and long term complications. These included radiation cystitis, radiation proctitis, and impotence. We discussed success, failure, and salvage therapeutic options.Also discussed the use of SpaceOAR " for brachytherapy and EBRT and fiducials for EBRT.   We discussed surgical therapy in depth including preoperative preparation, surgical technique (including bladder neck and nerve-sparing techniques), postoperative recuperation and recovery, and short and long term complications including UTI, bleeding, blood clots,catheter dislodgement, etc. We discussed the risks of reoperation, incontinence, impotence, and recurrence. We discussed preop and postop Kegels, post op penile rehab, and treatment options for incontinence and impotence. We discussed rates of cancer free survival and recurrence, as well as salvage therapeutic options. We discussed the possible  indications for adjuvant radiation therapy.   I answered questions and addressed concerns. Also discussed the Prolaris test to get genetic information about this tumors potential    Prolaris ordered.  Letter to Dr. Ramirez.  Patient  interested in AS (active surveillance).  6 months with PSA.    Alex Chavez

## 2024-08-15 ENCOUNTER — OFFICE VISIT (OUTPATIENT)
Dept: UROLOGY | Facility: CLINIC | Age: 64
End: 2024-08-15
Payer: COMMERCIAL

## 2024-08-15 VITALS
HEIGHT: 69 IN | HEART RATE: 65 BPM | BODY MASS INDEX: 27.46 KG/M2 | SYSTOLIC BLOOD PRESSURE: 104 MMHG | WEIGHT: 185.44 LBS | DIASTOLIC BLOOD PRESSURE: 66 MMHG

## 2024-08-15 DIAGNOSIS — C61 PROSTATE CANCER: Primary | ICD-10-CM

## 2024-08-15 DIAGNOSIS — Z86.73 HISTORY OF CVA (CEREBROVASCULAR ACCIDENT): ICD-10-CM

## 2024-08-15 DIAGNOSIS — I26.94 MULTIPLE SUBSEGMENTAL PULMONARY EMBOLI WITHOUT ACUTE COR PULMONALE: ICD-10-CM

## 2024-08-15 PROCEDURE — G2211 COMPLEX E/M VISIT ADD ON: HCPCS | Mod: S$GLB,,, | Performed by: UROLOGY

## 2024-08-15 PROCEDURE — 3074F SYST BP LT 130 MM HG: CPT | Mod: CPTII,S$GLB,, | Performed by: UROLOGY

## 2024-08-15 PROCEDURE — 3008F BODY MASS INDEX DOCD: CPT | Mod: CPTII,S$GLB,, | Performed by: UROLOGY

## 2024-08-15 PROCEDURE — 1159F MED LIST DOCD IN RCRD: CPT | Mod: CPTII,S$GLB,, | Performed by: UROLOGY

## 2024-08-15 PROCEDURE — 3078F DIAST BP <80 MM HG: CPT | Mod: CPTII,S$GLB,, | Performed by: UROLOGY

## 2024-08-15 PROCEDURE — 99999 PR PBB SHADOW E&M-EST. PATIENT-LVL III: CPT | Mod: PBBFAC,,, | Performed by: UROLOGY

## 2024-08-15 PROCEDURE — 99215 OFFICE O/P EST HI 40 MIN: CPT | Mod: S$GLB,,, | Performed by: UROLOGY

## 2024-08-15 NOTE — LETTER
August 15, 2024        Corinne Ramirez MD  [address]  [facility] [address]             [facility] - Urology 2nd Fl  [address]  [facility] [address]  Phone: [phone]   Patient: Suraj Constantino   MR Number: [id number]   YOB: 1960   Date of Visit: 8/15/2024       Dear Dr. Ramirez:    Thank you for referring Suraj Constantino to me for evaluation. Attached you will find relevant portions of my assessment and plan of care.    If you have questions, please do not hesitate to call me. I look forward to following Suraj Constantino along with you.    Sincerely,      Alex Chavez MD            CC    No Recipients    Enclosure

## 2024-10-14 RX ORDER — APIXABAN 5 MG/1
5 TABLET, FILM COATED ORAL 2 TIMES DAILY
Qty: 60 TABLET | Refills: 11 | Status: SHIPPED | OUTPATIENT
Start: 2024-10-14

## 2024-10-14 NOTE — TELEPHONE ENCOUNTER
Refill Routing Note   Medication(s) are not appropriate for processing by [facility] for the following reason(s):        Outside of protocol    [facility] action(s):  Route             Appointments  past 12m or future 3m with PCP    Date Provider   Last Visit   7/26/2024 Corinne Ramirez MD   Next Visit   11/29/2024 Corinne Ramirez MD   ED visits in past 90 days: 0        Note composed:12:11 PM 10/14/2024

## 2024-10-28 PROBLEM — I26.94 MULTIPLE SUBSEGMENTAL PULMONARY EMBOLI WITHOUT ACUTE COR PULMONALE: Status: RESOLVED | Noted: 2017-01-01 | Resolved: 2024-10-28

## 2024-11-29 ENCOUNTER — OFFICE VISIT (OUTPATIENT)
Dept: INTERNAL MEDICINE | Facility: CLINIC | Age: 64
End: 2024-11-29
Payer: COMMERCIAL

## 2024-11-29 ENCOUNTER — HOSPITAL ENCOUNTER (OUTPATIENT)
Dept: RADIOLOGY | Facility: HOSPITAL | Age: 64
Discharge: HOME | End: 2024-11-29
Attending: INTERNAL MEDICINE
Payer: COMMERCIAL

## 2024-11-29 VITALS
HEIGHT: 65 IN | WEIGHT: 191.38 LBS | SYSTOLIC BLOOD PRESSURE: 112 MMHG | BODY MASS INDEX: 31.89 KG/M2 | HEART RATE: 63 BPM | DIASTOLIC BLOOD PRESSURE: 70 MMHG

## 2024-11-29 DIAGNOSIS — E55.9 VITAMIN D DEFICIENCY DISEASE: ICD-10-CM

## 2024-11-29 DIAGNOSIS — M79.672 LEFT FOOT PAIN: ICD-10-CM

## 2024-11-29 DIAGNOSIS — D68.9 CLOTTING DISORDER: ICD-10-CM

## 2024-11-29 DIAGNOSIS — C61 PROSTATE CANCER: Primary | ICD-10-CM

## 2024-11-29 DIAGNOSIS — E78.5 HYPERLIPIDEMIA, UNSPECIFIED HYPERLIPIDEMIA TYPE: ICD-10-CM

## 2024-11-29 PROCEDURE — 73630 X-RAY EXAM OF FOOT: CPT | Mod: TC,LT

## 2024-11-29 PROCEDURE — 99999 PR PBB SHADOW E&M-EST. PATIENT-LVL IV: CPT | Mod: PBBFAC,,, | Performed by: INTERNAL MEDICINE

## 2024-11-29 PROCEDURE — 73630 X-RAY EXAM OF FOOT: CPT | Mod: 26,LT,, | Performed by: RADIOLOGY

## 2024-11-29 NOTE — Clinical Note
Seeing caleb today.  Discussed his Polaris report - recommended single modal treatment for his prostate cancer. He is scheduled to see you in Feb (6 month follow up). He would like to see you sooner for discussion regarding his prostate cancer Corinne Ramirez M.D.

## 2024-11-29 NOTE — PROGRESS NOTES
Chief Complaint- follow up of multilple issue     HPI- this is a 64 year old man who present for follow up of multiple issues.      He had prostate biopsies on 7/5/24 - prostate adenocarcinoma on 5 out of 7 biopsies. He saw Dr Chavez in urology 8/15/2024. Polaris biopsy test report - reveals single modal therapy.    He continues to take Eliquis 5 mg po twice daily for coagulation disorder and pulmonary embolus. No chest pain, shortness of breath, palpitations. .      He is working in [employer] at the [employer] - in [facility].      He continues to have an intermittent aching pain (6-8/10) in the great toe of the left foot for years. He is now having more left foot pain.  He climbs ladders and steps daily for work.  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     His sister has cutaneous lupus. Mother had a blood clot at age 85 after a long airplane flight. Brother age 68 just had a couple strokes and is now in a wheelchair.  He was drinker.          Vision is better. Hearing is getting worse. Hearing test on job last week and reports hearing test results have not changed.     He has had trouble sleeping - saw sleep medicine 5/27/24 - has not had a sleep study       REVIEW OF SYSTEMS: No fevers, chills, night sweats, fatigue, visual change, hearing loss, sinus congestion, sore throat, chest pain, shortness of breath, nausea, vomiting, constipation, diarrhea, dysuria, hematuria, polydipsia, polyuria, joint pain, muscle pain, headaches, anxiety, depression, insomnia.         PAST MEDICAL HISTORY:  1. Acute stroke in the left frontoparietal region, June 2007. Symptoms resolved upon discharge from the hospital. MRI was consistent with acute stroke.   2. Suggested carrier for factor V Leiden mutation in June 2007 at the time of his stroke. HE had a low activated protein C level at 1.8 with negative gene mutation.  3. Lymph node biopsy in 1989 that was benign.  4. History of leukopenia.  "He reports he had a workup at [facility] that was negative.  5. Pulmonary embolus Jan 2017.  6. Bilateral pulmonary embolus February 2024            Past Surgical History:   Procedure Laterality Date    COLONOSCOPY N/A 4/23/2021     Procedure: COLONOSCOPY;  Surgeon: Ranjith Garcia MD;  Location: [facility] ([address] [place] [address]);  Service: Endoscopy;  Laterality: N/A;  positive covid 2/12/21-BB    SURGICAL REMOVAL OF LYMPH NODE   1989     right arm            MEDS AND ALLERGIES: Updated on EPIC   He does not smoke. . He is  with 3 children. He works in a refinery as a .       PHYSICAL EXAM:     /70 (BP Location: Right arm)   Pulse 63   Ht 5' 5" (1.651 m)   Wt 86.8 kg (191 lb 5.8 oz)   BMI 31.84 kg/m²     GENERAL: Alert, oriented. No apparent distress. Affect within normal  limits. Conjunctivae anicteric. Tympanic membranes clear. Oropharynx  clear.  NECK: Supple. Respiratory effort normal. Lungs clear to auscultation.  HEART: Regular rate and rhythm without murmurs, gallops, or rubs. No lower  extremity edema.  ABDOMEN: Soft, nondistended, nontender. Bowel sounds present. No  hepatosplenomegaly.         ASSESSMENT/PLAN      Suraj Constantino is a 64 y.o. male     ASSESSMENT AND PLAN:  1. Prostate cancer - follow up with Dr Chavez regarding Polaris results     2. Clotting disorder  recent pulmonary embolus 2/2024 with History of stroke and Pulmonary embolus- continue Eliquis lifelong  3. Foot pain -xray and to ortho  5.  Fatigue - saw sleep medicine.     Colonoscopy normal 4/2021 due 3 years due to incomplete prep - 4/2024- have to defer due to recent pulmonary embolus.   I will see him back in 4 months.   "

## 2024-11-29 NOTE — Clinical Note
Scheduled pt to see you on 12/20/24 for OA left first MTP joint - having lots of pain and likely ne eds surgery. Do you operate on feet?  Not sure - your name came up under apts for feet. If you do not operate on feet, who in ortho operates on feet. Thanks Corinne

## 2024-12-02 ENCOUNTER — TELEPHONE (OUTPATIENT)
Dept: INTERNAL MEDICINE | Facility: CLINIC | Age: 64
End: 2024-12-02
Payer: COMMERCIAL

## 2024-12-02 NOTE — TELEPHONE ENCOUNTER
Please contact pt.  He needs to see Dr Jamel Schmitz or Dr Ferreira in orthopedics for evaluation of the arthritis in the left foot.    Please assist in scheduling an apt with Dr Schmitz or Dr Ferreira. I have cancelled the apt with dr auguste for December 20th

## 2024-12-02 NOTE — TELEPHONE ENCOUNTER
----- Message from Luis Rousseau MD sent at 12/2/2024 12:29 PM CST -----  No, I will see them but I do not operate on them.  You should send the patient to Dr. Jamel Jensen or Dr. Guy Ferreira.  Please feel free to message me any time you would like to find out who the best provider would be for a given patient.  We have a lot of misreferrals.  ----- Message -----  From: Corinne Ramirez MD  Sent: 11/29/2024   4:17 PM CST  To: Corinne Ramirez MD; Luis Rousseau MD; #    Scheduled pt to see you on 12/20/24 for OA left first MTP joint - having lots of pain and likely ne eds surgery. Do you operate on feet?  Not sure - your name came up under apts for feet. If you do not operate on feet, who in ortho operates on feet.  Thanks  Corinne

## 2024-12-03 ENCOUNTER — TELEPHONE (OUTPATIENT)
Dept: INTERNAL MEDICINE | Facility: CLINIC | Age: 64
End: 2024-12-03
Payer: COMMERCIAL

## 2024-12-03 NOTE — TELEPHONE ENCOUNTER
----- Message from Eboni sent at 12/3/2024  1:54 PM CST -----  Contact: [phone]  Returning a phone call.    Who left a message for the patient:  Carol Tejada MA    Do they know what this is regarding:  yes    Would they like a phone call back or a response via [email or web address]:  call

## 2024-12-06 NOTE — TELEPHONE ENCOUNTER
Spoke with patient  Booked apt with DR Schmitz and take 2 tylenol arthritis 3 times daily for pain

## 2024-12-23 NOTE — PROGRESS NOTES
Clinic Note  12/23/2024      Subjective:         Chief Complaint:   YONIS Constantino is a 64 y.o. male diagnosed with prostate cancer.  Co-morbidities- thrombophilia needing lifelong anticoagulation, CVA, vickie PE. Consult from Dr. Ramirez.  Works at [employer] as a [employer]. Cassie, his wife, is a nurse.     FH - negative  PSA - 6.5  PSAD-0.28  AJCC Stage - T1C?  STAR CAP stage- T1B  Volume - 23 ccs  MRI - 3/19/2024- L1-1.5 cm RMTZ PI-RADS 5.MRI negative for EPE (extraprostatic extension), NVBI (neurovascular bundle involvement), SVI (seminal vesicle involvement), or nodes.   Biopsy - 7/5/2024- UroNav- [place] 3+3 (GG2) left apex 1/2 6%, right apex 1/2 16%, right middle 1/2 7%, right base 1/2 9%, target 1/4 4%. Overall 5/7 sites, 5/16 cores.  RHIANNON Score - 2 low risk  NCCN - low risk  Germline testing -not indicated  Somatic testing - discussed, results below.  STAR CAP-    12/24/2024-F/U talk on 8/14/2024.  Prolaris score-4.5.  5.4% 10yr DSM(disease specific mortality) with AS (active surveillance).  3.9% 10yr METS with SMT (single modality therapy).  2.4% 10yr METS with ADT+EBRT.         Lab Results   Component Value Date    PSA 5.2 (H) 03/03/2023    PSA 2.6 02/12/2021    PSA 1.2 06/13/2019    PSA 0.84 05/28/2011    PSA 0.53 02/19/2009    PSATOTAL 6.5 (H) 02/05/2024    PSAFREE 0.38 02/05/2024    PSAFREEPCT 5.85 02/05/2024      Past Medical History:   Diagnosis Date    Acute pulmonary emboli 01/01/2017    DDD (degenerative disc disease), lumbar 04/14/2023    Elevated PSA 02/05/2024    Pulmonary embolism     Stroke      Family History   Problem Relation Name Age of Onset    Heart disease Father       Social History     Socioeconomic History    Marital status:    Occupational History     Employer: [employer]   Tobacco Use    Smoking status: Never    Smokeless tobacco: Never   Substance and Sexual Activity    Alcohol use: Yes     Alcohol/week: 1.0 standard drink of alcohol     Types: 1 Cans of beer per week  "   Drug use: Never   Social History Narrative    Addy, lives alone, 2 kids.     Social Drivers of Health     Financial Resource Strain: Low Risk  (8/15/2024)    Overall Financial Resource Strain (CARDIA)     Difficulty of Paying Living Expenses: Not hard at all   Food Insecurity: No Food Insecurity (8/15/2024)    Hunger Vital Sign     Worried About Running Out of Food in the Last Year: Never true     Ran Out of Food in the Last Year: Never true   Physical Activity: Sufficiently Active (8/15/2024)    Exercise Vital Sign     Days of Exercise per Week: 6 days     Minutes of Exercise per Session: 150+ min   Stress: No Stress Concern Present (8/15/2024)    [employer] - Occupational Stress Questionnaire     Feeling of Stress : Only a little   Housing Stability: Unknown (8/15/2024)    Housing Stability Vital Sign     Unable to Pay for Housing in the Last Year: No     Past Surgical History:   Procedure Laterality Date    COLONOSCOPY N/A 4/23/2021    Procedure: COLONOSCOPY;  Surgeon: Ranjith Garcia MD;  Location: [address];  Service: Endoscopy;  Laterality: N/A;  positive covid 2/12/21-BB    SURGICAL REMOVAL OF LYMPH NODE  1989    right arm     Patient Active Problem List   Diagnosis    Decreased peripheral vision of left eye    DDD (degenerative disc disease), lumbar    Lumbar spondylosis    Prostate cancer    Clotting disorder    Arthritis of left foot    History of CVA (cerebrovascular accident)     Review of Systems      Objective:      There were no vitals taken for this visit.  Estimated body mass index is 31.84 kg/m² as calculated from the following:    Height as of 11/29/24: 5' 5" (1.651 m).    Weight as of 11/29/24: 86.8 kg (191 lb 5.8 oz).  Physical Exam      Assessment and Plan:   Today's visit was spent almost entirely on counseling. We reviewed his diagnosis, stage, grade, risk group, and prognosis. We discussed D'[place] (NCCN) and RHIANNON risk stratification  We " discussed the concept of low risk, intermediate risk, and high risk disease. We also reviewed the NCCN treatment nomogram.We discussed the different treatment options including active surveillance (as well as the surveillance protocol), prostate brachytherapy, EBRT, SBRT (stereotactic body radiation therapy),cryotherapy, HIFU and both open and robotic prostatectomy.We also discussed the advantages, disadvantages, risks and benefits, as well as complications of each option. Regarding radiation therapy we discussed treatment planning, the different techniques, short and long term complications. These included radiation cystitis, radiation proctitis, and impotence. We discussed success, failure, and salvage therapeutic options.Also discussed the use of SpaceOAR for brachytherapy and EBRT and fiducials for EBRT.   We discussed surgical therapy in depth including preoperative preparation, surgical technique (including bladder neck and nerve-sparing techniques), postoperative recuperation and recovery, and short and long term complications including UTI, bleeding, blood clots,catheter dislodgement, etc. We discussed the risks of reoperation, incontinence, impotence, and recurrence. We discussed preop and postop Kegels, post op penile rehab, and treatment options for incontinence and impotence. We discussed rates of cancer free survival and recurrence, as well as salvage therapeutic options. We discussed the possible  indications for adjuvant radiation therapy.  Reviewed Prolaris results.  Patient interested in radiation therapy. Consult Dr. Dillon.        Problem List Items Addressed This Visit       Prostate cancer - Primary       Follow up:       Alex Chavez

## 2024-12-24 ENCOUNTER — OFFICE VISIT (OUTPATIENT)
Dept: UROLOGY | Facility: CLINIC | Age: 64
End: 2024-12-24
Payer: COMMERCIAL

## 2024-12-24 VITALS
HEIGHT: 65 IN | WEIGHT: 183 LBS | SYSTOLIC BLOOD PRESSURE: 117 MMHG | HEART RATE: 68 BPM | DIASTOLIC BLOOD PRESSURE: 73 MMHG | BODY MASS INDEX: 30.49 KG/M2

## 2024-12-24 DIAGNOSIS — Z86.73 HISTORY OF CVA (CEREBROVASCULAR ACCIDENT): ICD-10-CM

## 2024-12-24 DIAGNOSIS — C61 PROSTATE CANCER: Primary | ICD-10-CM

## 2024-12-24 PROCEDURE — 99215 OFFICE O/P EST HI 40 MIN: CPT | Mod: S$GLB,,, | Performed by: UROLOGY

## 2024-12-24 PROCEDURE — 1159F MED LIST DOCD IN RCRD: CPT | Mod: CPTII,S$GLB,, | Performed by: UROLOGY

## 2024-12-24 PROCEDURE — 3078F DIAST BP <80 MM HG: CPT | Mod: CPTII,S$GLB,, | Performed by: UROLOGY

## 2024-12-24 PROCEDURE — 3008F BODY MASS INDEX DOCD: CPT | Mod: CPTII,S$GLB,, | Performed by: UROLOGY

## 2024-12-24 PROCEDURE — 99999 PR PBB SHADOW E&M-EST. PATIENT-LVL III: CPT | Mod: PBBFAC,,, | Performed by: UROLOGY

## 2024-12-24 PROCEDURE — 3074F SYST BP LT 130 MM HG: CPT | Mod: CPTII,S$GLB,, | Performed by: UROLOGY

## 2024-12-24 PROCEDURE — G2211 COMPLEX E/M VISIT ADD ON: HCPCS | Mod: S$GLB,,, | Performed by: UROLOGY

## 2025-01-14 ENCOUNTER — TELEPHONE (OUTPATIENT)
Dept: UROLOGY | Facility: CLINIC | Age: 65
End: 2025-01-14
Payer: COMMERCIAL

## 2025-01-27 ENCOUNTER — TELEPHONE (OUTPATIENT)
Dept: PODIATRY | Facility: CLINIC | Age: 65
End: 2025-01-27
Payer: COMMERCIAL

## 2025-01-27 NOTE — TELEPHONE ENCOUNTER
Left message that he is scheduled on 2/14/25 at 11:30am. Please call if that date and time don't work please call office.----- Message from Magdy Swanson sent at 1/25/2025  9:15 AM CST -----  Regarding: FW: Returning Call  Contact: [phone]    ----- Message -----  From: Keturah Naidu  Sent: 1/24/2025   3:47 PM CST  To: Dorene LEE Staff  Subject: Returning Call                                   Type:  Patient Returning Call    Who Called: Suraj Constantino     Who Left Message for Patient: Pt do not know     Does the patient know what this is regarding?: No     Would the patient rather a call back or a response via [facility]? Call     Best Call Back Number: [phone]

## 2025-02-06 NOTE — PROGRESS NOTES
Patient is scheduled for L foot pain. Had seen PCP & apparently referred to ortho.-appts.canceled. Patient was last here > 1-1/2 yrs.ago. Current pain level up to 10/10 depending on activity.  Has been steel toe boots for about a year.  5/1/23  Suraj Constantino is a 64 y.o. male who presents new to this clinic, having seen Dr. Álvarez, just 2-3 wks.ago, w/ c/o pain big toe joint L  foot. States ran track in high school but even jogging hurts. 'Feels like stepped on w/ a high heel' as well as achy. Injections 7-8 yrs.ago. Has used topical & PO NSAIDs w/out much relief. Used wider toe box shoes. No mechanical offloading though. Would like to discuss surgical option. Seen on referral from Dr. Álvarez.    Works @ [employer] in [place] in [facility]; lives in [place].    Corinne Ramirez MD 11/29/24    Past Medical History:   Diagnosis Date    Acute pulmonary emboli 01/01/2017    DDD (degenerative disc disease), lumbar 04/14/2023    Elevated PSA 02/05/2024    Pulmonary embolism     Stroke       Patient Active Problem List   Diagnosis    Decreased peripheral vision of left eye    DDD (degenerative disc disease), lumbar    Lumbar spondylosis    Prostate cancer    Clotting disorder    Arthritis of left foot    History of CVA (cerebrovascular accident)     Physical Exam  Vitals reviewed.   Constitutional:       General: He is not in acute distress.     Appearance: He is well-developed and overweight.   Cardiovascular:      Pulses: Normal pulses.           Dorsalis pedis pulses are 2+ on the left side.   Musculoskeletal:         General: Tenderness present. No swelling or signs of injury.      Right lower leg: No edema.      Left lower leg: No edema.      Left foot: Decreased range of motion. Deformity present.   Feet:      Left foot:      Skin integrity: Skin integrity normal.      Comments: Equinus B/L ankles with < 90 deg foot to leg noted with knees extended.      Musculoskeletal strength of extrinsics to foot  and ankle B/L + 5/5 in DF/PF/Inv/Ev to resistance with no reproduction of pain in any direction.     Passive ROM of ankle and pedal joints is painless and without crepitation B/L except for 1st MPJ L - limitation in dorsiflexion w/ end bony ROM.<20 degrees.  Dorsal bony prominence 1st MTPJ L     Skin:     General: Skin is warm and dry.      Capillary Refill: Capillary refill takes less than 2 seconds.      Findings: No bruising, erythema or lesion.      Comments: Skin tone, color, turgor is appropriate for age and gender   Neurological:      Mental Status: He is alert and oriented to person, place, and time.      Sensory: Sensation is intact. No sensory deficit.      Motor: Motor function is intact. No weakness.      Gait: Gait is intact. Gait normal.   Psychiatric:         Mood and Affect: Mood and affect normal.         Behavior: Behavior normal. Behavior is cooperative.        X-Ray Foot Complete 3 view Left  Order: [id number]  Status: Final result     Visible to patient: No (inaccessible in Patient Portal)     Next appt: 05/12/2023 at 11:00 AM in Outpatient Rehab ([facility])     Dx: Left foot pain     0 Result Notes  Details    Reading Physician Reading Date Result Priority   Jean Chacon MD  [phone] 3/3/2023 Routine     Narrative & Impression  EXAMINATION:  XR FOOT COMPLETE 3 VIEW LEFT     CLINICAL HISTORY:  .  Pain in left foot     TECHNIQUE:  AP, lateral and oblique views of the left foot were performed.     COMPARISON:  Left foot radiographs dated May 23, 2011     FINDINGS:  Three views of the left foot demonstrate no evidence for acute fracture or dislocation.  There is moderate to severe 1st MTP joint space narrowing, which is progressed when compared with the prior study.  Previously identified radiopaque retained foreign body in the volar soft tissues of the forefoot is again noted.  Joint spaces are otherwise relatively well preserved.  Soft tissues are within normal limits.     Impression:      As above.  Progression of degenerative disease at the 1st MTP        Electronically signed by: Jean Chacon MD  Date:                                            03/03/2023  Time:                                           16:17   I independently reviewed the Xray images. Narrowing of 1st MPJ w/ flattening of met.head, consistent w/ clinical ssx.    Problem List Items Addressed This Visit    None  Visit Diagnoses         History of stroke    -  Primary      History of pulmonary embolism          Long term current use of anticoagulant          Left foot pain          Pain in joint of left foot        Relevant Medications    diclofenac sodium (VOLTAREN) 1 % Gel    Other Relevant Orders    Injection 1st MPJ L: L great MTP      Hallux limitus, acquired, left              Discussed general issues surrounding hallux limitus along with the advantages and disadvantages of various tx strategies.  Discussed shoe choice.  Discussed non-prescription inserts.  -Added PPT arch pad & met.bar to insert of shoe.  -Discussed the pros and cons of surgery and elected to see response to offloading pads, along w use of NSAIDs, both topical & p.o, &/ or injection w/LA & steroid.  Patient was start with diclofenac gel topically up to q.i.d. & Mobic 15 mg q.d.  Injection 1st MPJ L.  Recommended a surgical solution if the severity of sx does not respond to conservative treatment above - (will revisit w/ discussion of 1st MPJ fusion under local w/ MAC sedation; will be NWB minimum 6 wks.up to 3 months in an orthowedge shoe, & how it would affect work - short term disability prn).    FU  in 4-6 weeks, sooner p.r.n..         A total of 39 mins.was spent on chart review, patient visit & documentation.

## 2025-02-12 ENCOUNTER — LAB VISIT (OUTPATIENT)
Dept: LAB | Facility: HOSPITAL | Age: 65
End: 2025-02-12
Attending: UROLOGY
Payer: COMMERCIAL

## 2025-02-12 DIAGNOSIS — C61 PROSTATE CANCER: ICD-10-CM

## 2025-02-12 LAB — COMPLEXED PSA SERPL-MCNC: 9.9 NG/ML (ref 0–4)

## 2025-02-12 PROCEDURE — 84153 ASSAY OF PSA TOTAL: CPT | Performed by: UROLOGY

## 2025-02-12 PROCEDURE — 36415 COLL VENOUS BLD VENIPUNCTURE: CPT | Performed by: UROLOGY

## 2025-02-13 ENCOUNTER — TELEPHONE (OUTPATIENT)
Dept: PODIATRY | Facility: CLINIC | Age: 65
End: 2025-02-13
Payer: COMMERCIAL

## 2025-02-13 ENCOUNTER — PATIENT MESSAGE (OUTPATIENT)
Dept: PODIATRY | Facility: CLINIC | Age: 65
End: 2025-02-13
Payer: COMMERCIAL

## 2025-02-13 NOTE — TELEPHONE ENCOUNTER
Left message returning his call again.----- Message from Donna sent at 2/13/2025 11:46 AM CST -----  Regarding: returning phone call  Pt called regarding returning a phone call to Kiki Smith about his appt tomorrow. Please leave a message about the appt on his cell phone or the portal      Please call back at [phone]

## 2025-02-13 NOTE — TELEPHONE ENCOUNTER
Left message returning his call. Kiki----- Message from Vish sent at 2/13/2025 10:01 AM CST -----  Regarding: Consult/Advisory  Contact: [phone]  Consult/Advisory     Name Of Caller: Suraj Constantino        Contact Preference:  [phone]     Nature of call: Pt is calling to find out what may be done at his appt tomorrow. Would like a call back between 12 and 1

## 2025-02-13 NOTE — TELEPHONE ENCOUNTER
Spoke with patient and he wanted to know if the procedure will be done tomorrow, not sure what. Explained to patient that Dr. Catherine has not seen him in over a year and would need an evaluation to discuss options. Verbalized understanding and no further issues discussed. ----- Message from Sakshi sent at 2/13/2025  2:09 PM CST -----  Regarding: pt advice  Contact: [phone]  ..Type:  Patient Returning Call    Who Called:pt   Who Left Message for Patient:Kiki Smith  Does the patient know what this is regarding?:appt   Would the patient rather a call back or a response via [facility]? Call   Best Call Back Number: [phone]  Additional Information: n/a

## 2025-02-14 ENCOUNTER — OFFICE VISIT (OUTPATIENT)
Dept: PODIATRY | Facility: CLINIC | Age: 65
End: 2025-02-14
Payer: COMMERCIAL

## 2025-02-14 VITALS
HEIGHT: 65 IN | SYSTOLIC BLOOD PRESSURE: 118 MMHG | DIASTOLIC BLOOD PRESSURE: 81 MMHG | BODY MASS INDEX: 31.24 KG/M2 | HEART RATE: 60 BPM | WEIGHT: 187.5 LBS

## 2025-02-14 DIAGNOSIS — M25.572 PAIN IN JOINT OF LEFT FOOT: ICD-10-CM

## 2025-02-14 DIAGNOSIS — Z86.711 HISTORY OF PULMONARY EMBOLISM: ICD-10-CM

## 2025-02-14 DIAGNOSIS — M20.5X2 HALLUX LIMITUS, ACQUIRED, LEFT: ICD-10-CM

## 2025-02-14 DIAGNOSIS — M79.672 LEFT FOOT PAIN: ICD-10-CM

## 2025-02-14 DIAGNOSIS — Z79.01 LONG TERM CURRENT USE OF ANTICOAGULANT: ICD-10-CM

## 2025-02-14 DIAGNOSIS — Z86.73 HISTORY OF STROKE: Primary | ICD-10-CM

## 2025-02-14 PROCEDURE — 1159F MED LIST DOCD IN RCRD: CPT | Mod: CPTII,S$GLB,, | Performed by: PODIATRIST

## 2025-02-14 PROCEDURE — 20600 DRAIN/INJ JOINT/BURSA W/O US: CPT | Mod: LT,S$GLB,, | Performed by: PODIATRIST

## 2025-02-14 PROCEDURE — 3074F SYST BP LT 130 MM HG: CPT | Mod: CPTII,S$GLB,, | Performed by: PODIATRIST

## 2025-02-14 PROCEDURE — 3008F BODY MASS INDEX DOCD: CPT | Mod: CPTII,S$GLB,, | Performed by: PODIATRIST

## 2025-02-14 PROCEDURE — 99214 OFFICE O/P EST MOD 30 MIN: CPT | Mod: 25,S$GLB,, | Performed by: PODIATRIST

## 2025-02-14 PROCEDURE — 99999 PR PBB SHADOW E&M-EST. PATIENT-LVL III: CPT | Mod: PBBFAC,,, | Performed by: PODIATRIST

## 2025-02-14 PROCEDURE — 3079F DIAST BP 80-89 MM HG: CPT | Mod: CPTII,S$GLB,, | Performed by: PODIATRIST

## 2025-02-14 RX ORDER — GABAPENTIN 100 MG/1
100-300 CAPSULE ORAL NIGHTLY
COMMUNITY
Start: 2024-12-29

## 2025-02-14 RX ORDER — MELOXICAM 15 MG/1
15 TABLET ORAL DAILY
Qty: 30 TABLET | Refills: 1 | Status: SHIPPED | OUTPATIENT
Start: 2025-02-14

## 2025-02-14 RX ORDER — DICLOFENAC SODIUM 10 MG/G
2 GEL TOPICAL 4 TIMES DAILY
Qty: 350 G | Refills: 2 | Status: SHIPPED | OUTPATIENT
Start: 2025-02-14

## 2025-02-14 RX ADMIN — METHYLPREDNISOLONE ACETATE 40 MG: 40 INJECTION, SUSPENSION INTRA-ARTICULAR; INTRALESIONAL; INTRAMUSCULAR; SOFT TISSUE at 11:02

## 2025-02-14 RX ADMIN — DEXAMETHASONE SODIUM PHOSPHATE 4 MG: 4 INJECTION, SOLUTION INTRA-ARTICULAR; INTRALESIONAL; INTRAMUSCULAR; INTRAVENOUS; SOFT TISSUE at 11:02

## 2025-02-14 NOTE — PATIENT INSTRUCTIONS
PPT arch pad w/ adhesive - large  PPT met bar w/ adhesive - large    Visco-gel universal metatarsal strap - small/ medium

## 2025-02-25 RX ORDER — DEXAMETHASONE SODIUM PHOSPHATE 4 MG/ML
4 INJECTION, SOLUTION INTRA-ARTICULAR; INTRALESIONAL; INTRAMUSCULAR; INTRAVENOUS; SOFT TISSUE
Status: DISCONTINUED | OUTPATIENT
Start: 2025-02-14 | End: 2025-02-25 | Stop reason: HOSPADM

## 2025-02-25 RX ORDER — METHYLPREDNISOLONE ACETATE 40 MG/ML
40 INJECTION, SUSPENSION INTRA-ARTICULAR; INTRALESIONAL; INTRAMUSCULAR; SOFT TISSUE
Status: DISCONTINUED | OUTPATIENT
Start: 2025-02-14 | End: 2025-02-25 | Stop reason: HOSPADM

## 2025-02-25 NOTE — PROCEDURES
Injection 1st MPJ L: L great MTP    Date/Time: 2/14/2025 11:30 AM    Performed by: Radha Catherine DPM  Authorized by: Radha Catherine DPM    Consent Done?:  Yes (Verbal)  Indications:  Pain  Local anesthesia used?: Yes    Anesthesia:  Local infiltration  Local anesthetic:  Bupivacaine 0.5% without epinephrine, lidocaine 2% without epinephrine and topical anesthetic  Anesthetic total (ml):  1    Location:  Great toe  Site:  L great MTP  Needle size:  25 G  Approach:  Dorsal  Medications:  4 mg dexAMETHasone 4 mg/mL; 40 mg methylPREDNISolone acetate 40 mg/mL  Patient tolerance:  Patient tolerated the procedure well with no immediate complications

## 2025-03-05 ENCOUNTER — TELEPHONE (OUTPATIENT)
Dept: ORTHOPEDICS | Facility: CLINIC | Age: 65
End: 2025-03-05
Payer: COMMERCIAL

## 2025-03-05 NOTE — TELEPHONE ENCOUNTER
Called and spoke to patient and confirmed he booked [facility] appt for his foot pain. Patient told me he is already in treatment with podiatry, so I advised him to follow up with them as [facility] does not see feet. He was okay with me cancelling his [facility] appt with [facility] on 3/6/25

## 2025-03-12 NOTE — PROGRESS NOTES
Clinic Note  3/12/2025      Subjective:         Chief Complaint:   YONIS Constantino is a 64 y.o. male diagnosed with prostate cancer.  Co-morbidities- thrombophilia needing lifelong anticoagulation, CVA, vickie PE. Consult from Dr. Ramirez.  Works at [employer] as a [employer]. Cassie, his wife, is a nurse.     FH - negative  PSA - 6.5  PSAD-0.28  AJCC Stage - T1C?  STAR CAP stage- T1B  Volume - 23 ccs  MRI - 3/19/2024- L1-1.5 cm RMTZ PI-RADS 5.MRI negative for EPE (extraprostatic extension), NVBI (neurovascular bundle involvement), SVI (seminal vesicle involvement), or nodes.   Biopsy - 7/5/2024- [facility]- [place] 3+3 (GG2) left apex 1/2 6%, right apex 1/2 16%, right middle 1/2 7%, right base 1/2 9%, target 1/4 4%. Overall 5/7 sites, 5/16 cores.  RHIANNON Score - 2 low risk  NCCN - low risk  Germline testing -not indicated  Somatic testing - discussed, results below.  STAR CAP-    12/24/2024-F/U talk on 8/14/2024.  Prolaris score-4.5.  5.4% 10yr DSM(disease specific mortality) with AS (active surveillance).  3.9% 10yr METS with SMT (single modality therapy).  2.4% 10yr METS with ADT+EBRT.         3/13/2025-PSA 9.9. The patient states he would still like to undergo radiation therapy. Appointment with Dr. Dillon on 3/17/2025.       Lab Results   Component Value Date    PSA 5.2 (H) 03/03/2023    PSA 2.6 02/12/2021    PSA 1.2 06/13/2019    PSA 0.84 05/28/2011    PSA 0.53 02/19/2009    PSADIAG 9.9 (H) 02/12/2025    PSATOTAL 6.5 (H) 02/05/2024    PSAFREE 0.38 02/05/2024    PSAFREEPCT 5.85 02/05/2024      Past Medical History:   Diagnosis Date    Acute pulmonary emboli 01/01/2017    DDD (degenerative disc disease), lumbar 04/14/2023    Elevated PSA 02/05/2024    Pulmonary embolism     Stroke      Family History   Problem Relation Name Age of Onset    Heart disease Father       Social History[1]  Past Surgical History:   Procedure Laterality Date    COLONOSCOPY N/A 4/23/2021    Procedure: COLONOSCOPY;  Surgeon: Ranjith Garcia,  "MD;  Location: [facility] ([address] [place] [address]);  Service: Endoscopy;  Laterality: N/A;  positive covid 2/12/21-BB    SURGICAL REMOVAL OF LYMPH NODE  1989    right arm     Problem List[2]  Review of Systems   Constitutional:  Negative for chills and fever.         Objective:      There were no vitals taken for this visit.  Estimated body mass index is 31.2 kg/m² as calculated from the following:    Height as of 2/14/25: 5' 5" (1.651 m).    Weight as of 2/14/25: 85.1 kg (187 lb 8 oz).  Physical Exam  Vitals reviewed.   Constitutional:       Appearance: Normal appearance.   Abdominal:      General: Abdomen is flat. There is no distension.      Palpations: Abdomen is soft.      Tenderness: There is no abdominal tenderness.   Musculoskeletal:         General: Normal range of motion.   Skin:     General: Skin is warm.      Capillary Refill: Capillary refill takes less than 2 seconds.   Neurological:      General: No focal deficit present.      Mental Status: He is alert and oriented to person, place, and time.           Assessment and Plan:           Problem List Items Addressed This Visit       Prostate cancer - Primary       Follow up:       Alex Chavez               [1]   Social History  Socioeconomic History    Marital status:    Occupational History     Employer: [employer]   Tobacco Use    Smoking status: Never    Smokeless tobacco: Never   Substance and Sexual Activity    Alcohol use: Yes     Alcohol/week: 1.0 standard drink of alcohol     Types: 1 Cans of beer per week    Drug use: Never   Social History Narrative    , lives alone, 2 kids.     Social Drivers of Health     Financial Resource Strain: Low Risk  (8/15/2024)    Overall Financial Resource Strain (CARDIA)     Difficulty of Paying Living Expenses: Not hard at all   Food Insecurity: No Food Insecurity (8/15/2024)    Hunger Vital Sign     Worried About Running Out of Food in the Last Year: Never true     Ran Out of Food in the Last Year: Never " true   Physical Activity: Sufficiently Active (8/15/2024)    Exercise Vital Sign     Days of Exercise per Week: 6 days     Minutes of Exercise per Session: 150+ min   Stress: No Stress Concern Present (8/15/2024)    [place] [employer] - Occupational Stress Questionnaire     Feeling of Stress : Only a little   Housing Stability: Unknown (8/15/2024)    Housing Stability Vital Sign     Unable to Pay for Housing in the Last Year: No   [2]   Patient Active Problem List  Diagnosis    Decreased peripheral vision of left eye    DDD (degenerative disc disease), lumbar    Lumbar spondylosis    Prostate cancer    Clotting disorder    Arthritis of left foot    History of CVA (cerebrovascular accident)

## 2025-03-13 ENCOUNTER — OFFICE VISIT (OUTPATIENT)
Dept: UROLOGY | Facility: CLINIC | Age: 65
End: 2025-03-13
Payer: COMMERCIAL

## 2025-03-13 VITALS
DIASTOLIC BLOOD PRESSURE: 69 MMHG | WEIGHT: 189.13 LBS | BODY MASS INDEX: 31.51 KG/M2 | SYSTOLIC BLOOD PRESSURE: 115 MMHG | HEIGHT: 65 IN | HEART RATE: 70 BPM

## 2025-03-13 DIAGNOSIS — C61 PROSTATE CANCER: Primary | ICD-10-CM

## 2025-03-13 PROCEDURE — 3008F BODY MASS INDEX DOCD: CPT | Mod: CPTII,S$GLB,, | Performed by: UROLOGY

## 2025-03-13 PROCEDURE — 99999 PR PBB SHADOW E&M-EST. PATIENT-LVL III: CPT | Mod: PBBFAC,,, | Performed by: UROLOGY

## 2025-03-13 PROCEDURE — G2211 COMPLEX E/M VISIT ADD ON: HCPCS | Mod: S$GLB,,, | Performed by: UROLOGY

## 2025-03-13 PROCEDURE — 3074F SYST BP LT 130 MM HG: CPT | Mod: CPTII,S$GLB,, | Performed by: UROLOGY

## 2025-03-13 PROCEDURE — 3078F DIAST BP <80 MM HG: CPT | Mod: CPTII,S$GLB,, | Performed by: UROLOGY

## 2025-03-13 PROCEDURE — 99213 OFFICE O/P EST LOW 20 MIN: CPT | Mod: S$GLB,,, | Performed by: UROLOGY

## 2025-03-13 PROCEDURE — 1159F MED LIST DOCD IN RCRD: CPT | Mod: CPTII,S$GLB,, | Performed by: UROLOGY

## 2025-03-17 ENCOUNTER — OFFICE VISIT (OUTPATIENT)
Dept: RADIATION ONCOLOGY | Facility: CLINIC | Age: 65
End: 2025-03-17
Attending: RADIOLOGY
Payer: COMMERCIAL

## 2025-03-17 VITALS
SYSTOLIC BLOOD PRESSURE: 116 MMHG | HEIGHT: 65 IN | HEART RATE: 65 BPM | TEMPERATURE: 98 F | BODY MASS INDEX: 31.72 KG/M2 | WEIGHT: 190.38 LBS | DIASTOLIC BLOOD PRESSURE: 69 MMHG | OXYGEN SATURATION: 96 %

## 2025-03-17 DIAGNOSIS — C61 PROSTATE CANCER: ICD-10-CM

## 2025-03-17 PROCEDURE — 3074F SYST BP LT 130 MM HG: CPT | Mod: CPTII,S$GLB,, | Performed by: RADIOLOGY

## 2025-03-17 PROCEDURE — 3078F DIAST BP <80 MM HG: CPT | Mod: CPTII,S$GLB,, | Performed by: RADIOLOGY

## 2025-03-17 PROCEDURE — 1160F RVW MEDS BY RX/DR IN RCRD: CPT | Mod: CPTII,S$GLB,, | Performed by: RADIOLOGY

## 2025-03-17 PROCEDURE — 1159F MED LIST DOCD IN RCRD: CPT | Mod: CPTII,S$GLB,, | Performed by: RADIOLOGY

## 2025-03-17 PROCEDURE — 99204 OFFICE O/P NEW MOD 45 MIN: CPT | Mod: S$GLB,,, | Performed by: RADIOLOGY

## 2025-03-17 PROCEDURE — 3008F BODY MASS INDEX DOCD: CPT | Mod: CPTII,S$GLB,, | Performed by: RADIOLOGY

## 2025-03-17 PROCEDURE — 99999 PR PBB SHADOW E&M-EST. PATIENT-LVL IV: CPT | Mod: PBBFAC,,, | Performed by: RADIOLOGY

## 2025-03-17 NOTE — PROGRESS NOTES
[facility] / [facility] - Radiation Oncology     Patient ID: Suraj Constantino is a 65 y.o. male.    Chief Complaint: Prostate Cancer      HPI  Review of Systems    Physical Exam       Assessment and Plan

## 2025-03-17 NOTE — PROGRESS NOTES
Multidisciplinary Uro-Oncology Clinic  [facility] - [facility]     HISTORY OF PRESENT ILLNESS:   This patient presents for discussion of treatment options for his prostate cancer.    Mr. Constantino has a history of an elevated PSA for the past 2 years.  PSA in March of 2023 was 5.2 ng/ml.  Repeat PSA in February of 2024 increased to 6.2 ng/ml.  MRI revealed a 23 cc prostate with a 1.5 cm PI-RADS 5 lesion in the Rt. mid transitional zone with no extraprostatic extension.  The seminal vesicles and neurovascular bundles were unremarkable. There was no adenopathy.  Biopsies on 7/5/24 revealed [place] 6 (3+3) adenocarcinoma involving 16% of 1/2 cores from the Rt. apex and <10% of 1 core each from the Lt. apex, Rt. mid gland, Rt. base and target lesion.  The remaining 11 cores were benign.  Polaris evaluation indicated single modality therapy.  His most recent PSA on 2/12/25 was 9.9 ng/ml. Today the patient states he feels well.  No complaints.     REVIEW OF SYSTEMS:   Review of Systems   Constitutional:  Negative for chills, fever, malaise/fatigue and weight loss.   Cardiovascular:  Negative for chest pain and palpitations.   Gastrointestinal:  Negative for abdominal pain, constipation and diarrhea.   Genitourinary:  Negative for dysuria, frequency, hematuria and urgency.        AUA 0,2,0,1,1,0,0  MARINA 14     PAST MEDICAL HISTORY:  Past Medical History:   Diagnosis Date    Acute pulmonary emboli 01/01/2017    DDD (degenerative disc disease), lumbar 04/14/2023    Elevated PSA 02/05/2024    Pulmonary embolism     Stroke        PAST SURGICAL HISTORY:  Past Surgical History:   Procedure Laterality Date    COLONOSCOPY N/A 4/23/2021    Procedure: COLONOSCOPY;  Surgeon: Ranjith Garcia MD;  Location: [facility] ([address] [place] [address]);  Service: Endoscopy;  Laterality: N/A;  positive covid 2/12/21-BB    SURGICAL REMOVAL OF LYMPH NODE  1989    right arm       ALLERGIES:   Review of patient's allergies indicates:  Vascular dementia persists.  The patient will continue Aricept at 10 mg p.o. daily.     Allergen Reactions    Pcn [penicillins] Hives       MEDICATIONS:  Current Outpatient Medications   Medication Sig    diclofenac sodium (VOLTAREN) 1 % Gel Apply 2 g topically 4 (four) times daily.    ELIQUIS 5 mg Tab TAKE 1 TABLET(5 MG) BY MOUTH TWICE DAILY    gabapentin (NEURONTIN) 100 MG capsule Take 100-300 mg by mouth every evening.    meloxicam (MOBIC) 15 MG tablet Take 1 tablet (15 mg total) by mouth once daily.     No current facility-administered medications for this visit.     SOCIAL HISTORY:  Social History[1]    FAMILY HISTORY:  Family History   Problem Relation Name Age of Onset    Heart disease Father         PHYSICAL EXAMINATION:  Vitals:    25 1142   BP: 116/69   Pulse: 65   Temp: 97.6 °F (36.4 °C)     Physical Exam  Constitutional:       General: He is not in acute distress.     Appearance: Normal appearance.   Neurological:      Mental Status: He is alert and oriented to person, place, and time.   Psychiatric:         Mood and Affect: Mood normal.         Judgment: Judgment normal.         ASSESSMENT/PLAN:  Stage I (T1c, N0, M0, GG1, PSA < 10) prostate cancer.     ECO    I had a long discussion with the patient.  Explained he is felt to have low risk prostate cancer.  Discussed the implications of this classification and the significance of his Polaris evaluation.  Explained he has a > 5.5% risk of death from prostate cancer at 10 years.  Recommended he consider some form of definitive treatment.  Discussed the pros and cons or RALP with bilateral node dissection vs definitive radiotherapy either with external beam therapy or prostate brachytherapy.  Discussed the procedures, risks and benefits of each treatment option.  Reviewed the acute and long term side effects of treatment.  After our discussion, the patient felt he would like to proceed with prostate brachytherapy.  Will plan to schedule brachytherapy with myself and Dr. Chavez in the coming weeks.  Will plan to use Palladium 103  seeds.  Consent signed..     I spent approximately 50 minutes reviewing the available records and evaluating the patient, out of which over 50% of the time was spent face to face with the patient in counseling and coordinating this patient's care.             [1]   Social History  Socioeconomic History    Marital status:    Occupational History     Employer: [employer]   Tobacco Use    Smoking status: Never    Smokeless tobacco: Never   Substance and Sexual Activity    Alcohol use: Yes     Alcohol/week: 1.0 standard drink of alcohol     Types: 1 Cans of beer per week    Drug use: Never   Social History Narrative    , lives alone, 2 kids.     Social Drivers of Health     Financial Resource Strain: Low Risk  (8/15/2024)    Overall Financial Resource Strain (CARDIA)     Difficulty of Paying Living Expenses: Not hard at all   Food Insecurity: No Food Insecurity (8/15/2024)    Hunger Vital Sign     Worried About Running Out of Food in the Last Year: Never true     Ran Out of Food in the Last Year: Never true   Physical Activity: Sufficiently Active (8/15/2024)    Exercise Vital Sign     Days of Exercise per Week: 6 days     Minutes of Exercise per Session: 150+ min   Stress: No Stress Concern Present (8/15/2024)    Citizen of [place] of Occupational Health - Occupational Stress Questionnaire     Feeling of Stress : Only a little   Housing Stability: Unknown (8/15/2024)    Housing Stability Vital Sign     Unable to Pay for Housing in the Last Year: No

## 2025-03-26 ENCOUNTER — TELEPHONE (OUTPATIENT)
Dept: PODIATRY | Facility: CLINIC | Age: 65
End: 2025-03-26
Payer: COMMERCIAL

## 2025-03-27 ENCOUNTER — TELEPHONE (OUTPATIENT)
Dept: UROLOGY | Facility: CLINIC | Age: 65
End: 2025-03-27
Payer: COMMERCIAL

## 2025-03-27 ENCOUNTER — TELEPHONE (OUTPATIENT)
Dept: INTERNAL MEDICINE | Facility: CLINIC | Age: 65
End: 2025-03-27
Payer: COMMERCIAL

## 2025-03-27 DIAGNOSIS — C61 PROSTATE CANCER: Primary | ICD-10-CM

## 2025-03-27 NOTE — TELEPHONE ENCOUNTER
"----- Message from ALBERT Guerrier sent at 3/27/2025  1:11 PM CDT -----  Regarding: preop eliquis inst  Good afternoon, Dr. Ramirez. Mr. Constantino is scheduled for 04/14/25 "INSERTION, RADIOACTIVE SEED, PROSTATE" with Dr. Alex Chavez on 04/14/25. Dr. Chavez wants pt to hold his eliquis before procedure so I'm reaching out for preop eliquis instructions please. Much appreciated. Thank you,Fareed Ernst, [facility]  "

## 2025-03-27 NOTE — ANESTHESIA PAT ROS NOTE
03/27/2025  Suraj Constantino is a 65 y.o., male.      Pre-op Assessment    I have reviewed the NPO Status.   I have reviewed the Medications.     Review of Systems  Anesthesia Hx:  No problems with previous Anesthesia             Denies Family Hx of Anesthesia complications.    Denies Personal Hx of Anesthesia complications.                    Social:  Non-Smoker, Social Alcohol Use       Hematology/Oncology:                   Hematology Comments: Thrombophilia-needs lifelong anticoagulation    Current/Recent Cancer.  Other (see Oncology comments)          Oncology Comments: Prostate cancer     EENT/Dental:  EENT/Dental Normal           Cardiovascular:     Denies Pacemaker.                          pulmonary embolism Hx of PE Jan 2017, h/o bilateral PE Feb 2024                 Pulmonary:  Pulmonary Normal                       Renal/:     Hx of elevated PSA past 2 years, s/p prostate bx 7/5/24   Neoplasm/Tumor, Prostate Cancer.           Hepatic/GI:  Hepatic/GI Normal       Hx of colonoscopy             Musculoskeletal:  Arthritis   Lumbar DDD, spondylosis; left foot arthritis, chronic pain left foot, acquired hallux limitus (left big toe joint)       Spine Disorders: lumbar Degenerative disease           Neurological:   CVA (2007), no residual symptoms    Denies Seizures.                                Endocrine:        Obesity / BMI > 30  Dermatological:  Skin Normal    Psych:  Psychiatric Normal                         Anesthesia Assessment: Preoperative EQUATION    Planned Procedure: Procedure(s) (LRB):  INSERTION, RADIOACTIVE SEED, PROSTATE (N/A)  Requested Anesthesia Type:General  Surgeon: Alex Chavez MD  Service: Urology  Known or anticipated Date of Surgery:4/14/2025    Surgeon notes: reviewed    Electronic QUestionnaire Assessment completed via nurse interview with patient.        Triage  considerations:     The patient has no apparent active cardiac condition (No unstable coronary Syndrome such as severe unstable angina or recent [<1 month] myocardial infarction, decompensated CHF, severe valvular   disease or significant arrhythmia)    Previous anesthesia records: Gen Anes, No problems, No ett  04/23/21 colonoscopy, gen anes, ASA 2    Last PCP note: within 1 month , within [facility]   Subspecialty notes: Radiology/ONC, Urology    Other important co-morbidities: Obesity and prostate cancer, h/o elevated PSA past 2 years, s/p prostate bx 7/5/24, clotting disorder/ thrombophilia requiring lifelong anticoagulation (pt on Eliquis), h/o CVA 2007 w/ no residual effects, h/o bilateral PE Feb 2024 and prior PE hx Jan 2017, lumbar DDD/spondylosis, left foot arthritis/chronic pain, left foot acquired hallux limitus, h/o surgical removal LN right arm 1989, h/o colonoscopy       Tests already available:  Available tests,  within 3 months , > 1 year ago , within [facility] .   02/12/25 PSA (9.9)  02/06/24 TTE (EF 60-65%)  02/05/24 EKG (NSR)            Instructions given. (See in Nurse's note)    Optimization:  Anesthesia Preop Clinic Assessment Indicated:    --phone screen      Plan:    Testing:  BMP and Hematology Profile preop dosc     Navigation:  Straight Line to surgery.               No tests, anesthesia preop clinic visit, or consult required.    03/27/25 In-basket message to pcp requesting preop eliquis inst.

## 2025-03-28 ENCOUNTER — PATIENT MESSAGE (OUTPATIENT)
Dept: UROLOGY | Facility: CLINIC | Age: 65
End: 2025-03-28
Payer: COMMERCIAL

## 2025-03-28 ENCOUNTER — OFFICE VISIT (OUTPATIENT)
Dept: INTERNAL MEDICINE | Facility: CLINIC | Age: 65
End: 2025-03-28
Payer: COMMERCIAL

## 2025-03-28 ENCOUNTER — TELEPHONE (OUTPATIENT)
Dept: RADIATION ONCOLOGY | Facility: CLINIC | Age: 65
End: 2025-03-28
Payer: COMMERCIAL

## 2025-03-28 VITALS
HEIGHT: 65 IN | HEART RATE: 70 BPM | DIASTOLIC BLOOD PRESSURE: 76 MMHG | WEIGHT: 185.19 LBS | OXYGEN SATURATION: 94 % | SYSTOLIC BLOOD PRESSURE: 114 MMHG | BODY MASS INDEX: 30.85 KG/M2

## 2025-03-28 DIAGNOSIS — Z86.73 HISTORY OF CVA (CEREBROVASCULAR ACCIDENT): ICD-10-CM

## 2025-03-28 DIAGNOSIS — M79.672 LEFT FOOT PAIN: ICD-10-CM

## 2025-03-28 DIAGNOSIS — D68.9 CLOTTING DISORDER: ICD-10-CM

## 2025-03-28 DIAGNOSIS — C61 PROSTATE CANCER: Primary | ICD-10-CM

## 2025-03-28 DIAGNOSIS — E78.5 HYPERLIPIDEMIA, UNSPECIFIED HYPERLIPIDEMIA TYPE: ICD-10-CM

## 2025-03-28 PROCEDURE — 99999 PR PBB SHADOW E&M-EST. PATIENT-LVL III: CPT | Mod: PBBFAC,,, | Performed by: INTERNAL MEDICINE

## 2025-03-28 RX ORDER — TADALAFIL 20 MG/1
20 TABLET ORAL DAILY
Qty: 30 TABLET | Refills: 4 | Status: SHIPPED | OUTPATIENT
Start: 2025-03-28 | End: 2026-03-28

## 2025-03-28 NOTE — TELEPHONE ENCOUNTER
----- Message from ALBERT Quiles sent at 3/28/2025 10:45 AM CDT -----  Pt wants to know how long he needs to take of of work after procedure  ----- Message -----  From: Corinne Ramirez MD  Sent: 3/28/2025  10:15 AM CDT  To: Alex Chavez MD; Corinne Ramirez MD#    Saw patient today.I see that his prostate seeds implantation is scheduled for 4/28/25. Please contact him. He needs to know how much time he needs to take off of work after the procedure.  Zacarias Ramirez M.D.

## 2025-03-28 NOTE — PROGRESS NOTES
Chief Complaint- follow up of multilple issue     HPI- this is a 65 year old man who present for follow up of multiple issues.      He had prostate biopsies on 7/5/24 - prostate adenocarcinoma on 5 out of 7 biopsies. Polaris biopsy test report - reveals single modal therapy.He saw Dr Chavez in urology 3/13/25.  He saw Dr Dillon on 3/17/25 in radiation oncology.  He has decided on radioactive seed implants. Currently scheduled for 4/28/25    Energy level is not as good as it used to be. He feels due to his age. He works 7 days a week.       He continues to take Eliquis 5 mg po twice daily for coagulation disorder and pulmonary embolus. No chest pain, shortness of breath, palpitations. .      He is working in [employer] at the [employer] - in [facility].      He continues to have an intermittent aching pain (6-8/10) in the great toe of the left foot for years. He is now having more left foot pain.  He climbs ladders and steps daily for work.  Xray 3/3/23 revealed arthritis of the 1st mtp joint. He saw podiatry on 2/14/25- it did help.     Back is fine.     His sister has cutaneous lupus. Mother had a blood clot at age 85 after a long airplane flight. Brother age 68 just had a couple strokes and is now in a wheelchair.  He was drinker.          Vision is better. Hearing is getting worse. Hearing test on job last week and reports hearing test results have not changed.     He has had trouble sleeping - saw sleep medicine 5/27/24 - has not had a sleep study         He is not on vitamin D supplement    REVIEW OF SYSTEMS: No fevers, chills, night sweats, fatigue, visual change, hearing loss, sinus congestion, sore throat, chest pain, shortness of breath, nausea, vomiting, constipation, diarrhea, dysuria, hematuria, polydipsia, polyuria, joint pain, muscle pain, headaches, anxiety, depression, insomnia.         PAST MEDICAL HISTORY:  1. Acute stroke in the left frontoparietal region, June 2007. Symptoms resolved  "upon discharge from the hospital. MRI was consistent with acute stroke.   2. Suggested carrier for factor V Leiden mutation in June 2007 at the time of his stroke. HE had a low activated protein C level at 1.8 with negative gene mutation.  3. Lymph node biopsy in 1989 that was benign.  4. History of leukopenia. He reports he had a workup at [facility] that was negative.  5. Pulmonary embolus Jan 2017.  6. Bilateral pulmonary embolus February 2024                Past Surgical History:   Procedure Laterality Date    COLONOSCOPY N/A 4/23/2021     Procedure: COLONOSCOPY;  Surgeon: Ranjith Garcia MD;  [facility]  Service: Endoscopy;  Laterality: N/A;  positive covid 2/12/21-BB    SURGICAL REMOVAL OF LYMPH NODE   1989     right arm            MEDS AND ALLERGIES: Updated on EPIC   He does not smoke. . He is  with 3 children. He works in a refinery as a .       PHYSICAL EXAM:   /76 (BP Location: Right arm, Patient Position: Sitting)   Pulse 70   Ht 5' 5" (1.651 m)   Wt 84 kg (185 lb 3 oz)   SpO2 (!) 94%   BMI 30.82 kg/m²      GENERAL: Alert, oriented. No apparent distress. Affect within normal  limits. Conjunctivae anicteric. Tympanic membranes clear. Oropharynx  clear.  NECK: Supple. Respiratory effort normal. Lungs clear to auscultation.  HEART: Regular rate and rhythm without murmurs, gallops, or rubs. No lower  extremity edema.  ABDOMEN: Soft, nondistended, nontender. Bowel sounds present. No  hepatosplenomegaly.    Labs yesterday reviewed         ASSESSMENT/PLAN      Suraj Constantino is a 64 y.o. male     ASSESSMENT AND PLAN:  1. Prostate cancer - to have radioactive seeds.  Long discussion.  Low risk for procedure, may have this done.   Stop eliquis 2 days prior procedure     2. Clotting disorder  recent pulmonary embolus 2/2024 with History of stroke and Pulmonary embolus- continue Eliquis lifelong  3. OA first MTP joint  -saw podiatry - s/p injection      "   Colonoscopy normal 4/2021 due 3 years due to incomplete prep - 4/2024-- will get him through prostate seeds first then order colonoscopy  I will see him back in 6 months.

## 2025-03-28 NOTE — TELEPHONE ENCOUNTER
"----- Message from ALBERT Guerrier sent at 3/27/2025  1:11 PM CDT -----  Regarding: preop eliquis inst  Good afternoon, Dr. Ramirez. Mr. Constantino is scheduled for 04/14/25 "INSERTION, RADIOACTIVE SEED, PROSTATE" with Dr. Alex Chavez on 04/14/25. Dr. Chavez wants pt to hold his eliquis before procedure so I'm reaching out for preop eliquis instructions please. Much appreciated. Thank you,Fareed Ernst, [facility], [facility]  "

## 2025-03-28 NOTE — TELEPHONE ENCOUNTER
Returned call, but no one answer. Unable to leave a voicemail message, per recording,mailbox is full.

## 2025-04-01 ENCOUNTER — PATIENT MESSAGE (OUTPATIENT)
Dept: UROLOGY | Facility: CLINIC | Age: 65
End: 2025-04-01
Payer: COMMERCIAL

## 2025-04-07 ENCOUNTER — TELEPHONE (OUTPATIENT)
Dept: PODIATRY | Facility: CLINIC | Age: 65
End: 2025-04-07
Payer: COMMERCIAL

## 2025-04-07 NOTE — TELEPHONE ENCOUNTER
Left message again that we need to reschedule his dipika, on 4/8/25. I did reschedule him to 5/7/25 @11:30am. If that date and time don't work, please call office.

## 2025-04-08 ENCOUNTER — TELEPHONE (OUTPATIENT)
Dept: UROLOGY | Facility: CLINIC | Age: 65
End: 2025-04-08
Payer: COMMERCIAL

## 2025-05-01 ENCOUNTER — HOSPITAL ENCOUNTER (OUTPATIENT)
Dept: RADIATION THERAPY | Facility: HOSPITAL | Age: 65
Discharge: HOME | End: 2025-05-01
Attending: RADIOLOGY
Payer: COMMERCIAL

## 2025-05-09 ENCOUNTER — TELEPHONE (OUTPATIENT)
Dept: UROLOGY | Facility: CLINIC | Age: 65
End: 2025-05-09
Payer: COMMERCIAL

## 2025-05-11 PROCEDURE — 77295 3-D RADIOTHERAPY PLAN: CPT | Mod: TC | Performed by: RADIOLOGY

## 2025-05-11 PROCEDURE — 77295 3-D RADIOTHERAPY PLAN: CPT | Mod: 26,,, | Performed by: RADIOLOGY

## 2025-05-11 PROCEDURE — 77263 THER RADIOLOGY TX PLNG CPLX: CPT | Mod: ,,, | Performed by: RADIOLOGY

## 2025-05-12 ENCOUNTER — HOSPITAL ENCOUNTER (OUTPATIENT)
Facility: HOSPITAL | Age: 65
Discharge: HOME | End: 2025-05-12
Attending: UROLOGY | Admitting: UROLOGY
Payer: COMMERCIAL

## 2025-05-12 ENCOUNTER — HOSPITAL ENCOUNTER (OUTPATIENT)
Dept: RADIOLOGY | Facility: HOSPITAL | Age: 65
Discharge: HOME | End: 2025-05-12
Admitting: UROLOGY
Payer: COMMERCIAL

## 2025-05-12 ENCOUNTER — ANESTHESIA EVENT (OUTPATIENT)
Dept: SURGERY | Facility: HOSPITAL | Age: 65
End: 2025-05-12
Payer: COMMERCIAL

## 2025-05-12 ENCOUNTER — ANESTHESIA (OUTPATIENT)
Dept: SURGERY | Facility: HOSPITAL | Age: 65
End: 2025-05-12
Payer: COMMERCIAL

## 2025-05-12 VITALS
SYSTOLIC BLOOD PRESSURE: 136 MMHG | TEMPERATURE: 97 F | WEIGHT: 185.19 LBS | OXYGEN SATURATION: 97 % | DIASTOLIC BLOOD PRESSURE: 83 MMHG | BODY MASS INDEX: 30.85 KG/M2 | HEIGHT: 65 IN | HEART RATE: 54 BPM | RESPIRATION RATE: 16 BRPM

## 2025-05-12 DIAGNOSIS — Z01.818 PREOP TESTING: ICD-10-CM

## 2025-05-12 DIAGNOSIS — C61 PROSTATE CANCER: Primary | ICD-10-CM

## 2025-05-12 DIAGNOSIS — C61 PROSTATE CANCER: ICD-10-CM

## 2025-05-12 PROCEDURE — 63600175 PHARM REV CODE 636 W HCPCS

## 2025-05-12 PROCEDURE — 77300 RADIATION THERAPY DOSE PLAN: CPT | Mod: TC | Performed by: RADIOLOGY

## 2025-05-12 PROCEDURE — 77470 SPECIAL RADIATION TREATMENT: CPT | Mod: 26,59,, | Performed by: RADIOLOGY

## 2025-05-12 PROCEDURE — 71000015 HC POSTOP RECOV 1ST HR: Performed by: UROLOGY

## 2025-05-12 PROCEDURE — 71000044 HC DOSC ROUTINE RECOVERY FIRST HOUR: Performed by: UROLOGY

## 2025-05-12 PROCEDURE — 37000009 HC ANESTHESIA EA ADD 15 MINS: Performed by: UROLOGY

## 2025-05-12 PROCEDURE — 63600175 PHARM REV CODE 636 W HCPCS: Performed by: NURSE ANESTHETIST, CERTIFIED REGISTERED

## 2025-05-12 PROCEDURE — 77470 SPECIAL RADIATION TREATMENT: CPT | Mod: 59,TC | Performed by: RADIOLOGY

## 2025-05-12 PROCEDURE — 77014 CT GUIDANCE RADIATION THERAPY FIELD: CPT | Mod: TC

## 2025-05-12 PROCEDURE — 76965 ECHO GUIDANCE RADIOTHERAPY: CPT | Mod: 26,,, | Performed by: UROLOGY

## 2025-05-12 PROCEDURE — 37000008 HC ANESTHESIA 1ST 15 MINUTES: Performed by: UROLOGY

## 2025-05-12 PROCEDURE — 36000705 HC OR TIME LEV I EA ADD 15 MIN: Performed by: UROLOGY

## 2025-05-12 PROCEDURE — 27200651 HC AIRWAY, LMA: Performed by: ANESTHESIOLOGY

## 2025-05-12 PROCEDURE — 77778 APPLY INTERSTIT RADIAT COMPL: CPT | Mod: TC | Performed by: RADIOLOGY

## 2025-05-12 PROCEDURE — 36000704 HC OR TIME LEV I 1ST 15 MIN: Performed by: UROLOGY

## 2025-05-12 PROCEDURE — 55875 TRANSPERI NEEDLE PLACE PROS: CPT | Mod: ,,, | Performed by: UROLOGY

## 2025-05-12 PROCEDURE — 25000003 PHARM REV CODE 250: Performed by: NURSE ANESTHETIST, CERTIFIED REGISTERED

## 2025-05-12 PROCEDURE — 77778 APPLY INTERSTIT RADIAT COMPL: CPT | Mod: 26,,, | Performed by: RADIOLOGY

## 2025-05-12 PROCEDURE — 77370 RADIATION PHYSICS CONSULT: CPT | Performed by: RADIOLOGY

## 2025-05-12 PROCEDURE — 77790 RADIATION HANDLING: CPT | Mod: TC | Performed by: RADIOLOGY

## 2025-05-12 DEVICE — NDL W/PD103 SEEDS STERILE: Type: IMPLANTABLE DEVICE | Site: PROSTATE | Status: FUNCTIONAL

## 2025-05-12 RX ORDER — IBUPROFEN 800 MG/1
800 TABLET, FILM COATED ORAL 4 TIMES DAILY
Qty: 10 TABLET | Refills: 0 | Status: SHIPPED | OUTPATIENT
Start: 2025-05-12 | End: 2025-05-15

## 2025-05-12 RX ORDER — LIDOCAINE HYDROCHLORIDE 20 MG/ML
INJECTION, SOLUTION EPIDURAL; INFILTRATION; INTRACAUDAL; PERINEURAL
Status: DISCONTINUED | OUTPATIENT
Start: 2025-05-12 | End: 2025-05-12

## 2025-05-12 RX ORDER — HYDROCODONE BITARTRATE AND ACETAMINOPHEN 5; 325 MG/1; MG/1
1 TABLET ORAL EVERY 6 HOURS PRN
Qty: 6 TABLET | Refills: 0 | Status: SHIPPED | OUTPATIENT
Start: 2025-05-12

## 2025-05-12 RX ORDER — HYDROMORPHONE HYDROCHLORIDE 1 MG/ML
0.2 INJECTION, SOLUTION INTRAMUSCULAR; INTRAVENOUS; SUBCUTANEOUS EVERY 5 MIN PRN
Status: DISCONTINUED | OUTPATIENT
Start: 2025-05-12 | End: 2025-05-12 | Stop reason: HOSPADM

## 2025-05-12 RX ORDER — SODIUM CHLORIDE 0.9 % (FLUSH) 0.9 %
10 SYRINGE (ML) INJECTION EVERY 6 HOURS PRN
Status: DISCONTINUED | OUTPATIENT
Start: 2025-05-12 | End: 2025-05-12 | Stop reason: HOSPADM

## 2025-05-12 RX ORDER — CEFAZOLIN 2 G/1
2 INJECTION, POWDER, FOR SOLUTION INTRAMUSCULAR; INTRAVENOUS
Status: COMPLETED | OUTPATIENT
Start: 2025-05-12 | End: 2025-05-12

## 2025-05-12 RX ORDER — CIPROFLOXACIN 500 MG/1
500 TABLET, FILM COATED ORAL EVERY 12 HOURS
Qty: 14 TABLET | Refills: 0 | Status: SHIPPED | OUTPATIENT
Start: 2025-05-12 | End: 2025-05-19

## 2025-05-12 RX ORDER — PROPOFOL 10 MG/ML
VIAL (ML) INTRAVENOUS
Status: DISCONTINUED | OUTPATIENT
Start: 2025-05-12 | End: 2025-05-12

## 2025-05-12 RX ORDER — FENTANYL CITRATE 50 UG/ML
INJECTION, SOLUTION INTRAMUSCULAR; INTRAVENOUS
Status: DISCONTINUED | OUTPATIENT
Start: 2025-05-12 | End: 2025-05-12

## 2025-05-12 RX ORDER — METHYLPREDNISOLONE 4 MG/1
TABLET ORAL
Qty: 8 TABLET | Refills: 0 | Status: SHIPPED | OUTPATIENT
Start: 2025-05-12 | End: 2025-05-20

## 2025-05-12 RX ORDER — GLUCAGON 1 MG
1 KIT INJECTION
Status: DISCONTINUED | OUTPATIENT
Start: 2025-05-12 | End: 2025-05-12 | Stop reason: HOSPADM

## 2025-05-12 RX ORDER — TAMSULOSIN HYDROCHLORIDE 0.4 MG/1
0.4 CAPSULE ORAL DAILY
Qty: 30 CAPSULE | Refills: 2 | Status: SHIPPED | OUTPATIENT
Start: 2025-05-12 | End: 2026-05-12

## 2025-05-12 RX ORDER — HALOPERIDOL LACTATE 5 MG/ML
0.5 INJECTION, SOLUTION INTRAMUSCULAR EVERY 10 MIN PRN
Status: DISCONTINUED | OUTPATIENT
Start: 2025-05-12 | End: 2025-05-12 | Stop reason: HOSPADM

## 2025-05-12 RX ADMIN — LIDOCAINE HYDROCHLORIDE 100 MG: 20 INJECTION, SOLUTION EPIDURAL; INFILTRATION; INTRACAUDAL; PERINEURAL at 11:05

## 2025-05-12 RX ADMIN — CEFAZOLIN 2 G: 2 INJECTION, POWDER, FOR SOLUTION INTRAMUSCULAR; INTRAVENOUS at 11:05

## 2025-05-12 RX ADMIN — PROPOFOL 150 MG: 10 INJECTION, EMULSION INTRAVENOUS at 11:05

## 2025-05-12 RX ADMIN — FENTANYL CITRATE 100 MCG: 50 INJECTION, SOLUTION INTRAMUSCULAR; INTRAVENOUS at 12:05

## 2025-05-12 RX ADMIN — FENTANYL CITRATE 100 MCG: 50 INJECTION, SOLUTION INTRAMUSCULAR; INTRAVENOUS at 11:05

## 2025-05-12 RX ADMIN — SODIUM CHLORIDE: 0.9 INJECTION, SOLUTION INTRAVENOUS at 11:05

## 2025-05-12 NOTE — DISCHARGE INSTRUCTIONS
Post Prostate Brachyytherapy Seed Implantation Instructions  Do not strain to have a bowel movement  No strenuous exercise x 7 days  No driving while you are on narcotic pain medications or if your morales  catheter is in place    You can expect:  See a small amount of  blood in your urine    If you have a catheter, please return to the ER if your catheter stops draining or you are having abdominal pain.    Call the doctor if:  Temperature is greater than 101F  Persistent vomiting and inability to keep food down  Inability to void if you do not have a catheter

## 2025-05-12 NOTE — OP NOTE
[facility]  Operative Note     Date: 05/12/2025     Pre-Op Diagnosis: Prostate Cancer     Post-Op Diagnosis: same     Procedure(s) Performed:   1. Transrectal ultrasound of prostate  2. Ultrasound guided needle placement  3. Radioactive seed implantation in prostate  4. Fluoroscopy <1 hour     Specimen(s): none     Staff Surgeon: Justen Dillon MD     Assistant Surgeon: Kavin Fortune MD     Anesthesia: General LMA anesthesia     Indications: Suraj Constantino is a 65 y.o. male with prostate cancer     Findings: 74 seeds placed successfully. Does have some phimosis, able to place morales catheter.      Estimated Blood Loss: min     Drains: none     Procedure in detail:  The patient confirmed he had taken his pre-procedure antibiotics and did the bowel prep.  After risks benefits and possible complications of placement of radioactive seed placement were discussed, the patient elected to undergo the procedure and informed consent was obtained. All questions were answered in the pre-operative area. The patient was transferred to cystoscopy suite and placed in the supine position.  SCDs were applied and working.  Anesthesia was administered.  Once adequately sedated, the patient was placed in dorsal lithotomy position and prepped and draped in the usual sterile fashion. Time out was performed, jorge-procedural antibiotics were confirmed     A 16-Guamanian Morales catheter was advanced into the bladder and 10 mL of sterile water was used to inflate the balloon.  The patient's bladder was emptied completely and filled with 100 mL of  sterile water.     The US probe was introduced into the patient's rectum and the secured with stabilizers in coordination with the patients previous prostate mapping.       A total of 74 preloaded seeds were placed trans perineally into the prostate in accordance to the patients previous prostate mapping under the guidance of radiation oncology, who was present throughout the  procedure.       A KUB was taken at the end of the procedure to confirm seed implantation.        Follow up care:  The patient will follow up with Dr. Dillon as scheduled.  He will get a CT scan of the abdomen and pelvis before leaving today. He will be discharged home with prescriptions for antibiotics, medrol dose pack, ibuprofen 800 TID, and flomax       Kavin Fortune MD

## 2025-05-12 NOTE — TRANSFER OF CARE
"Anesthesia Transfer of Care Note    Patient: Suraj Constantino    Procedure(s) Performed: Procedure(s) (LRB):  INSERTION, RADIOACTIVE SEED, PROSTATE (N/A)  ULTRASOUND, RECTAL APPROACH (N/A)    Patient location: PACU    Anesthesia Type: general    Transport from OR: Transported from OR on 6-10 L/min O2 by face mask with adequate spontaneous ventilation    Post pain: adequate analgesia    Post assessment: no apparent anesthetic complications and tolerated procedure well    Post vital signs: stable    Level of consciousness: awake    Nausea/Vomiting: no nausea/vomiting    Complications: none    Transfer of care protocol was followed      Last vitals: Visit Vitals  /77 (BP Location: Right arm, Patient Position: Lying)   Pulse 61   Temp 36.6 °C (97.9 °F) (Temporal)   Resp 20   Ht 5' 5" (1.651 m)   Wt 84 kg (185 lb 3 oz)   SpO2 98%   BMI 30.82 kg/m²     "

## 2025-05-12 NOTE — ANESTHESIA POSTPROCEDURE EVALUATION
Anesthesia Post Evaluation    Patient: Suraj Constantino    Procedure(s) Performed: Procedure(s) (LRB):  INSERTION, RADIOACTIVE SEED, PROSTATE (N/A)  ULTRASOUND, RECTAL APPROACH (N/A)    Final Anesthesia Type: general      Patient location during evaluation: PACU  Patient participation: Yes- Able to Participate  Level of consciousness: awake and alert  Post-procedure vital signs: reviewed and stable  Pain management: adequate  Airway patency: patent    PONV status at discharge: No PONV  Anesthetic complications: no      Cardiovascular status: blood pressure returned to baseline  Respiratory status: unassisted  Hydration status: euvolemic  Follow-up not needed.              Vitals Value Taken Time   /83 05/12/25 14:15   Temp 36.2 °C (97.2 °F) 05/12/25 12:52   Pulse 54 05/12/25 14:15   Resp 16 05/12/25 14:15   SpO2 97 % 05/12/25 14:15         No case tracking events are documented in the log.      Pain/Richie Score: Ricihe Score: 9 (5/12/2025  1:15 PM)

## 2025-05-12 NOTE — OP NOTE
[facility]  Operative Note    Date: 05/12/2025    Pre-Op Diagnosis: Prostate Cancer    Post-Op Diagnosis: same    Procedure(s) Performed:   1. Transrectal ultrasound of prostate  2. Ultrasound guided needle placement  3. Radioactive seed implantation in prostate  4. Fluoroscopy <1 hour    Specimen(s): none    Staff Surgeon: Alex Chavez MD    Assistant Surgeon: Kavin Fortune MD    Anesthesia: General LMA anesthesia    Indications: Suraj Constantino is a 65 y.o. male with prostate cancer    Findings: 74 seeds placed successfully. Does have some phimosis, able to place morales catheter.     Estimated Blood Loss: min    Drains: none    Procedure in detail:  The patient confirmed he had taken his pre-procedure antibiotics and did the bowel prep.  After risks benefits and possible complications of placement of radioactive seed placement were discussed, the patient elected to undergo the procedure and informed consent was obtained. All questions were answered in the pre-operative area. The patient was transferred to cystoscopy suite and placed in the supine position.  SCDs were applied and working.  Anesthesia was administered.  Once adequately sedated, the patient was placed in dorsal lithotomy position and prepped and draped in the usual sterile fashion. Time out was performed, jorge-procedural antibiotics were confirmed    A 16-Azeri Morales catheter was advanced into the bladder and 10 mL of sterile water was used to inflate the balloon.  The patient's bladder was emptied completely and filled with 100 mL of  sterile water.    The US probe was introduced into the patient's rectum and the secured with stabilizers in coordination with the patients previous prostate mapping.      A total of 74 preloaded seeds were placed trans perineally into the prostate in accordance to the patients previous prostate mapping under the guidance of radiation oncology, who was present throughout the procedure.      A KUB  was taken at the end of the procedure to confirm seed implantation.       Follow up care:  The patient will follow up with Dr. Dillon as scheduled.  He will get a CT scan of the abdomen and pelvis before leaving today. He will be discharged home with prescriptions for antibiotics, medrol dose pack, ibuprofen 800 TID, and flomax      Kavin Fortune MD

## 2025-05-12 NOTE — H&P
"Preoperative History and Physical    Date:   2025    History of Present Illness:    65 y.o. male who presents for radioactive seeds placement.     Has held his eliquis appropriately.     There is no change in H&P since last office visit. Will proceed with planned procedure.      Past Medical History:    has a past medical history of Acute pulmonary emboli (2017), DDD (degenerative disc disease), lumbar (2023), Elevated PSA (2024), Pulmonary embolism, and Stroke.    Past Surgical History:    has a past surgical history that includes Surgical removal of lymph node () and Colonoscopy (N/A, 2021).    Social History:  Social History     Tobacco Use    Smoking status: Never    Smokeless tobacco: Never   Substance Use Topics    Alcohol use: Yes     Alcohol/week: 1.0 standard drink of alcohol     Types: 1 Cans of beer per week     Social History     Substance and Sexual Activity   Drug Use Never       Family History:  Family History   Problem Relation Name Age of Onset    Heart disease Father         Allergies:  Review of patient's allergies indicates:   Allergen Reactions    Pcn [penicillins] Hives       Home Medications:  Scheduled Meds:  Continuous Infusions:  PRN Meds:.  Current Facility-Administered Medications:     ceFAZolin (Ancef) IV (PEDS and ADULTS), 2 g, Intravenous, On Call Procedure      Review of Systems:  Negative except for what is noted in HPI    Physical Exam:  VITAL SIGNS:   Vitals:    25 1058 25 1110   BP:  122/77   BP Location:  Right arm   Patient Position:  Lying   Pulse: 61 61   Resp:  20   Temp:  97.9 °F (36.6 °C)   TempSrc:  Temporal   SpO2:  98%   Weight:  84 kg (185 lb 3 oz)   Height:  5' 5" (1.651 m)     TMAX: Temp (24hrs), Av.9 °F (36.6 °C), Min:97.9 °F (36.6 °C), Max:97.9 °F (36.6 °C)      General: Alert; No acute distress  Cardiovascular: Regular rate   Respiratory: Normal respiratory effort. Not in respiratory distress  Extremity: No obvious " "gross deformity of extremity  Neurologic: Normal speech  Psych: Normal behavior        Diagnostic Data:  No results found for this or any previous visit (from the past 2 weeks).  No results found for this or any previous visit (from the past 2 weeks).  Lab Results   Component Value Date    ALBUMIN 4.0 03/27/2025     Lab Results   Component Value Date    CRP 3.0 06/13/2019     Lab Results   Component Value Date    INR 1.0 02/05/2024     No results found for: "PTT"    Microbiology Results (last 7 days)       ** No results found for the last 168 hours. **            Assessment:  65 y.o.male here for seed placement     Plan:  Will proceed to OR  Consent obtained      Kavin Fortune MD - PGY2    "

## 2025-05-12 NOTE — BRIEF OP NOTE
[facility] (1st Fl)  Brief Operative Note    Surgery Date: 5/12/2025     Surgeons and Role:     * Alex Chavez MD - Primary     * Kavin Fortune MD - Resident - Assisting     * Justen Dillon Jr., MD        Pre-op Diagnosis:  Prostate cancer [C61]    Post-op Diagnosis:  Post-Op Diagnosis Codes:     * Prostate cancer [C61]    Procedure(s) (LRB):  INSERTION, RADIOACTIVE SEED, PROSTATE (N/A)  ULTRASOUND, RECTAL APPROACH (N/A)    Anesthesia: General    Operative Findings: Procedures as stated above successfully performed. No complications.     74 seeds placed.    Estimated Blood Loss: min         Specimens:   Specimen (24h ago, onward)      None            * No specimens in log *        Discharge Note    OUTCOME: Patient tolerated treatment/procedure well without complication and is now ready for discharge.    DISPOSITION: Home or Self Care    FINAL DIAGNOSIS:  same as above    FOLLOWUP: In clinic    DISCHARGE INSTRUCTIONS:  patient can be discharged home

## 2025-05-12 NOTE — ANESTHESIA PREPROCEDURE EVALUATION
[facility]  Anesthesia Pre-Operative Evaluation   05/12/2025        Suraj Constantino, 1960  [id number]  Procedure(s) (LRB):  INSERTION, RADIOACTIVE SEED, PROSTATE (N/A)  ULTRASOUND, RECTAL APPROACH (N/A)    Subjective    Suraj Constantino is a 65 y.o. male w/ a significant PMHx of thrombophilia (Factor V Leiden carrier) on Eliquis, hx of multiple pulmonary embolism (recently February 2024), and prostate cancer.    Patient now presents for above procedure(s).     Prev Airway: None documented.    LDA:        Peripheral IV - Single Lumen 05/12/25 1102 20 G Anterior;Left Forearm (Active)   Site Assessment Clean;Dry;Intact;No redness;No swelling 05/12/25 1102   Dressing Status Clean;Dry 05/12/25 1102   Dressing Intervention First dressing 05/12/25 1102   Number of days: 0       Drips: None documented.      Problem List[1]    Review of patient's allergies indicates:   Allergen Reactions    Pcn [penicillins] Hives       Current Inpatient Medications:       Medications Ordered Prior to Encounter[2]    Past Surgical History:   Procedure Laterality Date    COLONOSCOPY N/A 4/23/2021    Procedure: COLONOSCOPY;  Surgeon: Ranjith Garcia MD;  Location: [facility] ([address] [place] [address]);  Service: Endoscopy;  Laterality: N/A;  positive covid 2/12/21-BB    SURGICAL REMOVAL OF LYMPH NODE  1989    right arm       Social History:  Tobacco Use: Low Risk  (5/12/2025)    Patient History     Smoking Tobacco Use: Never     Smokeless Tobacco Use: Never     Passive Exposure: Not on file       Alcohol Use: Not At Risk (8/15/2024)    AUDIT-C     Frequency of Alcohol Consumption: 2-4 times a month     Average Number of Drinks: 3 or 4     Frequency of Binge Drinking: Never       Objective    Vital Signs Range:  BMI Readings from Last 1 Encounters:   05/12/25 30.82 kg/m²       Temp:  [36.6 °C (97.9 °F)]   Pulse:  [61]   Resp:  [20]   BP: (122)/(77)   SpO2:  [98 %]        Significant Labs:        Component Value Date/Time    WBC 2.92 (L)  03/27/2025 1642    HGB 13.7 (L) 03/27/2025 1642    HGB 14.1 03/19/2024 1649    HCT 40.7 03/27/2025 1642    HCT 44.0 03/19/2024 1649     03/27/2025 1642     03/19/2024 1649     03/27/2025 1642     03/19/2024 1649    K 4.1 03/27/2025 1642    K 4.2 03/19/2024 1649     03/27/2025 1642     03/19/2024 1649    CO2 24 03/27/2025 1642    CO2 26 03/19/2024 1649    GLU 83 03/27/2025 1642     03/19/2024 1649    BUN 14 03/27/2025 1642    CREATININE 1.2 03/27/2025 1642    MG 2.0 02/08/2024 0329    PHOS 3.2 02/08/2024 0329    CALCIUM 9.2 03/27/2025 1642    CALCIUM 10.0 03/19/2024 1649    ALBUMIN 4.0 03/27/2025 1642    ALBUMIN 3.8 03/19/2024 1649    ALBUMIN 4.2 03/26/2021 0822    PROT 7.7 03/27/2025 1642    PROT 6.9 03/19/2024 1649    ALKPHOS 62 03/27/2025 1642    ALKPHOS 72 03/19/2024 1649    BILITOT 0.6 03/27/2025 1642    BILITOT 0.3 03/19/2024 1649    AST 28 03/27/2025 1642    AST 27 03/19/2024 1649    ALT 27 03/27/2025 1642    ALT 25 03/19/2024 1649    INR 1.0 02/05/2024 1956    HGBA1C 5.7 06/13/2007 0610        Please see Results Review for additional labs.     Diagnostic Studies: All relevant studies, reviewed.      EKG:   Results for orders placed or performed during the hospital encounter of 02/05/24   EKG 12-lead    Collection Time: 02/05/24  5:34 PM   Result Value Ref Range    QRS Duration 80 ms    [facility] QTC Calculation 430 ms    Narrative    Test Reason : R07.9,    Vent. Rate : 088 BPM     Atrial Rate : 088 BPM     P-R Int : 126 ms          QRS Dur : 080 ms      QT Int : 356 ms       P-R-T Axes : 063 018 -07 degrees     QTc Int : 430 ms    Normal sinus rhythm  Nonspecific ST and T wave abnormality  Abnormal ECG  When compared with ECG of 05-JUL-2019 09:40,  Significant changes have occurred  Confirmed by Luis Maravilla MD ([id number]) on 2/7/2024 5:46:01 PM    Referred By: [email or web address]   SELF           Confirmed By:Luis Maravilla MD       ECHO:  Results for orders placed during the  hospital encounter of 02/05/24    Echo    Interpretation Summary    Left Ventricle: The left ventricle is normal in size. Normal wall thickness. Normal wall motion. There is normal systolic function with a visually estimated ejection fraction of 60 - 65%. Grade I diastolic dysfunction.    Right Ventricle: Normal right ventricular cavity size. Systolic function is normal.    Aorta: Aortic root is mildly dilated measuring 3.86 cm.    Pulmonary Artery: The estimated pulmonary artery systolic pressure is 24 mmHg.            Pre-op Assessment    I have reviewed the Patient Summary Reports.     I have reviewed the Nursing Notes. I have reviewed the NPO Status.   I have reviewed the Medications.     Review of Systems  Anesthesia Hx:  No problems with previous Anesthesia             Denies Family Hx of Anesthesia complications.    Denies Personal Hx of Anesthesia complications.                    Social:  Non-Smoker, Social Alcohol Use       Hematology/Oncology:                   Hematology Comments: Thrombophilia-needs lifelong anticoagulation   Factor V Leiden    Current/Recent Cancer.  Other (see Oncology comments)          Oncology Comments: Prostate cancer     EENT/Dental:  EENT/Dental Normal           Cardiovascular:  Exercise tolerance: good   Denies Pacemaker.              Denies SMITH.              Hx of PE Jan 2017, h/o bilateral PE Feb 2024                 Pulmonary:         Hx PE February 2024 on Eliquis               Renal/:     Hx of elevated PSA past 2 years, s/p prostate bx 7/5/24   Neoplasm/Tumor, Prostate Cancer.           Hepatic/GI:  Hepatic/GI Normal       Hx of colonoscopy             Musculoskeletal:  Arthritis   Lumbar DDD, spondylosis; left foot arthritis, chronic pain left foot, acquired hallux limitus (left big toe joint)       Spine Disorders: lumbar Degenerative disease           Neurological:   CVA (2007), no residual symptoms    Denies Seizures.                                Endocrine:         Obesity / BMI > 30  Dermatological:  Skin Normal    Psych:  Psychiatric Normal                    Physical Exam  General: Cooperative, Well nourished, Alert and Oriented    Airway:  Mallampati: II   Mouth Opening: Normal  TM Distance: Normal  Tongue: Normal  Neck ROM: Normal ROM    Dental:  Dentures  Dentures in place      Anesthesia Plan  Type of Anesthesia, risks & benefits discussed:    Anesthesia Type: Gen Natural Airway, MAC, Gen ETT  Intra-op Monitoring Plan: Standard ASA Monitors  Post Op Pain Control Plan: multimodal analgesia and IV/PO Opioids PRN  Induction:  IV  Airway Plan: Direct, Post-Induction  Informed Consent: Informed consent signed with the Patient and all parties understand the risks and agree with anesthesia plan.  All questions answered.   ASA Score: 3  Day of Surgery Review of History & Physical: H&P Update referred to the surgeon/provider.  Anesthesia Plan Notes: Last took his Eliquis on Thursday 5/8. Patient informed of risk to dentures during airway manipulation, including loss or damage. Patient refuses to remove dentures.     Ready For Surgery From Anesthesia Perspective.     .           [1]   Patient Active Problem List  Diagnosis    Decreased peripheral vision of left eye    DDD (degenerative disc disease), lumbar    Lumbar spondylosis    Prostate cancer    Clotting disorder    Arthritis of left foot    History of CVA (cerebrovascular accident)   [2]   No current facility-administered medications on file prior to encounter.     Current Outpatient Medications on File Prior to Encounter   Medication Sig Dispense Refill    diclofenac sodium (VOLTAREN) 1 % Gel Apply 2 g topically 4 (four) times daily. (Patient not taking: Reported on 3/28/2025) 350 g 2    ELIQUIS 5 mg Tab TAKE 1 TABLET(5 MG) BY MOUTH TWICE DAILY 60 tablet 11    gabapentin (NEURONTIN) 100 MG capsule Take 100-300 mg by mouth every evening.      meloxicam (MOBIC) 15 MG tablet Take 1 tablet (15 mg total) by mouth once daily.  (Patient not taking: Reported on 3/28/2025) 30 tablet 1

## 2025-05-12 NOTE — ANESTHESIA PROCEDURE NOTES
Intubation    Date/Time: 5/12/2025 11:48 AM    Performed by: Jose Ortiz CRNA  Authorized by: Angelika Childers MD    Intubation:     Induction:  Intravenous    Intubated:  Postinduction    Mask Ventilation:  Not attempted    Attempts:  1    Attempted By:  CRNA    Difficult Airway Encountered?: No      Complications:  None    Airway Device:  Supraglottic airway/LMA    Airway Device Size:  4.0    Style/Cuff Inflation:  Cuffed    Findings Post-Intubation:  BS equal bilateral and atraumatic/condition of teeth unchanged

## 2025-05-16 ENCOUNTER — TELEPHONE (OUTPATIENT)
Dept: RADIATION ONCOLOGY | Facility: CLINIC | Age: 65
End: 2025-05-16
Payer: COMMERCIAL

## 2025-05-16 NOTE — TELEPHONE ENCOUNTER
Phone review s/p brachytherapy. The patient states he is doing well. Afebrile. Denies pain. No dysuria or slight hematuria. Nocturia x 2. LBM, this AM, soft stools. Currently taking medications received at discharge. Confirmed follow-up appointment. The client agrees to contact the on-call provider if he has any questions or concerns over the weekend. Contact information provided.

## 2025-05-26 ENCOUNTER — OFFICE VISIT (OUTPATIENT)
Dept: RADIATION ONCOLOGY | Facility: CLINIC | Age: 65
End: 2025-05-26
Attending: RADIOLOGY
Payer: COMMERCIAL

## 2025-05-26 VITALS
HEART RATE: 57 BPM | BODY MASS INDEX: 27.4 KG/M2 | DIASTOLIC BLOOD PRESSURE: 79 MMHG | SYSTOLIC BLOOD PRESSURE: 126 MMHG | OXYGEN SATURATION: 96 % | HEIGHT: 69 IN | WEIGHT: 185 LBS

## 2025-05-26 DIAGNOSIS — C61 PROSTATE CANCER: Primary | ICD-10-CM

## 2025-05-26 PROCEDURE — 77318 BRACHYTX ISODOSE COMPLEX: CPT | Mod: TC | Performed by: RADIOLOGY

## 2025-05-26 PROCEDURE — 99999 PR PBB SHADOW E&M-EST. PATIENT-LVL IV: CPT | Mod: PBBFAC,,, | Performed by: RADIOLOGY

## 2025-05-26 PROCEDURE — 3078F DIAST BP <80 MM HG: CPT | Mod: CPTII,S$GLB,, | Performed by: RADIOLOGY

## 2025-05-26 PROCEDURE — C2640 BRACHYTX, STRANDED, P-103: HCPCS | Performed by: RADIOLOGY

## 2025-05-26 PROCEDURE — 99024 POSTOP FOLLOW-UP VISIT: CPT | Mod: S$GLB,,, | Performed by: RADIOLOGY

## 2025-05-26 PROCEDURE — 3074F SYST BP LT 130 MM HG: CPT | Mod: CPTII,S$GLB,, | Performed by: RADIOLOGY

## 2025-05-26 PROCEDURE — 1159F MED LIST DOCD IN RCRD: CPT | Mod: CPTII,S$GLB,, | Performed by: RADIOLOGY

## 2025-05-26 PROCEDURE — 77318 BRACHYTX ISODOSE COMPLEX: CPT | Mod: 26,,, | Performed by: RADIOLOGY

## 2025-05-26 PROCEDURE — 1160F RVW MEDS BY RX/DR IN RCRD: CPT | Mod: CPTII,S$GLB,, | Performed by: RADIOLOGY

## 2025-05-26 RX ORDER — IBUPROFEN 600 MG/1
TABLET, FILM COATED ORAL
Qty: 30 TABLET | Refills: 0 | Status: SHIPPED | OUTPATIENT
Start: 2025-05-26

## 2025-05-26 NOTE — PROGRESS NOTES
[facility] - Radiation Oncology     Patient ID: Suraj Constantino is a 65 y.o. male.    Chief Complaint: Prostate Cancer (S/p brachytherapy)      Mr. Constantino was recently diagnosed with Stage I (T1c, N0, M0, GG1, PSA < 10) prostate cancer.  He has a history of an elevated PSA for the past 2 years. Repeat PSA in February of 2024 increased to 6.2 ng/ml.  MRI revealed a 23 cc prostate with a 1.5 cm PI-RADS 5 lesion in the Rt. mid transitional zone with no extraprostatic extension.  The seminal vesicles and neurovascular bundles were unremarkable. There was no adenopathy.  Biopsies on 7/5/24 revealed Aguirre 6 (3+3) adenocarcinoma involving 16% of 1/2 cores from the Rt. apex and <10% of 1 core each from the Lt. apex, Rt. mid gland, Rt. base and target lesion.  The remaining 11 cores were benign.  [facility]is evaluation indicated single modality therapy.  He elected to proceed with prostate brachytherapy.  He underwent transperineal implantation of the prostate with Pd 103 seeds on 5/12/25.  Today the patient states he feels well.  Some slow urinary stream and hesitancy.  No significant dysuria or hematuria.        Review of Systems   Constitutional:  Negative for activity change, appetite change, chills, fatigue and fever.   Gastrointestinal:  Negative for constipation, diarrhea and fecal incontinence.   Genitourinary:  Positive for difficulty urinating. Negative for bladder incontinence, dysuria, frequency and hematuria.       Physical Exam  Constitutional:       General: He is not in acute distress.     Appearance: Normal appearance.   Neurological:      Mental Status: He is alert and oriented to person, place, and time.   Psychiatric:         Mood and Affect: Mood normal.         Judgment: Judgment normal.            Assessment and Plan   Prostate cancer - tolerating the acute effects of radiotherapy well.  Continue tamsulosin for the next 3 months.  Will plan to start ibuprofen. 600 mg twice a day  for next 2 weeks.  Plan follow up in 2 - 3 months with PSA.

## 2025-05-26 NOTE — PATIENT INSTRUCTIONS
Patient doing well.  2 weeks following implant.  Explained his symptoms are expected and our related to inflammation from the prostate gland.  We will plan to continue tamsulosin once a day in the evening.  Will treat with ibuprofen for 2 weeks to decrease the inflammation.  Explained his radioactive seeds have now given off almost 50% of their dose.  Explained by mid July his radioactive seeds will be < 20% of there initial strength.  Explained we caution patients to avoid having young children or pregnant women sitting on their lap for the first 60 days after the implant.  He can be in the same room with children or pregnant women, just should not sit on your lap for any extended length of time (hours).  After 60 days there is no restriction, the seeds are still in the prostate but only about 10% as strong and not giving off any radiotherapy that can travel outside the body.  After 90 days the seeds are totally complete

## 2025-06-05 ENCOUNTER — TELEPHONE (OUTPATIENT)
Dept: RADIATION ONCOLOGY | Facility: CLINIC | Age: 65
End: 2025-06-05
Payer: COMMERCIAL

## 2025-06-05 DIAGNOSIS — C61 PROSTATE CANCER: Primary | ICD-10-CM

## 2025-06-05 RX ORDER — METHYLPREDNISOLONE 4 MG/1
TABLET ORAL
Qty: 21 EACH | Refills: 1 | Status: SHIPPED | OUTPATIENT
Start: 2025-06-05 | End: 2025-06-26

## 2025-06-20 ENCOUNTER — TELEPHONE (OUTPATIENT)
Dept: PODIATRY | Facility: CLINIC | Age: 65
End: 2025-06-20
Payer: COMMERCIAL

## 2025-06-20 ENCOUNTER — TELEPHONE (OUTPATIENT)
Dept: RADIATION ONCOLOGY | Facility: CLINIC | Age: 65
End: 2025-06-20
Payer: COMMERCIAL

## 2025-06-20 NOTE — TELEPHONE ENCOUNTER
Spoke with patient and scheduled him on 8/13/25 @4:15pm. Pt verbalized understanding and no further issues discussed.

## 2025-06-25 DIAGNOSIS — C61 PROSTATE CANCER: Primary | ICD-10-CM

## 2025-06-25 RX ORDER — IBUPROFEN 800 MG/1
TABLET, FILM COATED ORAL
Qty: 30 TABLET | Refills: 1 | Status: SHIPPED | OUTPATIENT
Start: 2025-06-25

## 2025-06-25 RX ORDER — OXYBUTYNIN CHLORIDE 10 MG/1
10 TABLET, EXTENDED RELEASE ORAL DAILY
Qty: 30 TABLET | Refills: 11 | Status: SHIPPED | OUTPATIENT
Start: 2025-06-25 | End: 2026-06-25

## 2025-07-21 ENCOUNTER — PATIENT MESSAGE (OUTPATIENT)
Dept: ADMINISTRATIVE | Facility: HOSPITAL | Age: 65
End: 2025-07-21
Payer: COMMERCIAL

## 2025-08-11 RX ORDER — APIXABAN 5 MG/1
5 TABLET, FILM COATED ORAL 2 TIMES DAILY
Qty: 60 TABLET | Refills: 11 | Status: SHIPPED | OUTPATIENT
Start: 2025-08-11

## 2025-08-18 ENCOUNTER — LAB VISIT (OUTPATIENT)
Dept: LAB | Facility: OTHER | Age: 65
End: 2025-08-18
Attending: RADIOLOGY
Payer: COMMERCIAL

## 2025-08-18 ENCOUNTER — OFFICE VISIT (OUTPATIENT)
Dept: RADIATION ONCOLOGY | Facility: CLINIC | Age: 65
End: 2025-08-18
Attending: RADIOLOGY
Payer: COMMERCIAL

## 2025-08-18 VITALS
DIASTOLIC BLOOD PRESSURE: 77 MMHG | SYSTOLIC BLOOD PRESSURE: 111 MMHG | OXYGEN SATURATION: 96 % | WEIGHT: 181.69 LBS | BODY MASS INDEX: 26.91 KG/M2 | HEIGHT: 69 IN | HEART RATE: 50 BPM

## 2025-08-18 DIAGNOSIS — C61 PROSTATE CANCER: ICD-10-CM

## 2025-08-18 DIAGNOSIS — C61 PROSTATE CANCER: Primary | ICD-10-CM

## 2025-08-18 LAB — PSA SERPL-MCNC: 1.4 NG/ML

## 2025-08-18 PROCEDURE — 1159F MED LIST DOCD IN RCRD: CPT | Mod: CPTII,S$GLB,, | Performed by: RADIOLOGY

## 2025-08-18 PROCEDURE — 84153 ASSAY OF PSA TOTAL: CPT

## 2025-08-18 PROCEDURE — 3078F DIAST BP <80 MM HG: CPT | Mod: CPTII,S$GLB,, | Performed by: RADIOLOGY

## 2025-08-18 PROCEDURE — 3074F SYST BP LT 130 MM HG: CPT | Mod: CPTII,S$GLB,, | Performed by: RADIOLOGY

## 2025-08-18 PROCEDURE — 1160F RVW MEDS BY RX/DR IN RCRD: CPT | Mod: CPTII,S$GLB,, | Performed by: RADIOLOGY

## 2025-08-18 PROCEDURE — 36415 COLL VENOUS BLD VENIPUNCTURE: CPT

## 2025-08-18 PROCEDURE — 99024 POSTOP FOLLOW-UP VISIT: CPT | Mod: S$GLB,,, | Performed by: RADIOLOGY

## 2025-08-18 PROCEDURE — 99999 PR PBB SHADOW E&M-EST. PATIENT-LVL III: CPT | Mod: PBBFAC,,, | Performed by: RADIOLOGY

## 2025-08-18 RX ORDER — OXYBUTYNIN CHLORIDE 10 MG/1
10 TABLET, EXTENDED RELEASE ORAL DAILY
Qty: 90 TABLET | Refills: 3 | Status: SHIPPED | OUTPATIENT
Start: 2025-08-18 | End: 2026-08-18

## 2025-08-18 RX ORDER — TAMSULOSIN HYDROCHLORIDE 0.4 MG/1
0.4 CAPSULE ORAL DAILY
Qty: 90 CAPSULE | Refills: 3 | Status: SHIPPED | OUTPATIENT
Start: 2025-08-18 | End: 2026-08-18

## 2025-08-18 RX ORDER — SILDENAFIL 100 MG/1
100 TABLET, FILM COATED ORAL DAILY PRN
Qty: 30 TABLET | Refills: 6 | Status: SHIPPED | OUTPATIENT
Start: 2025-08-18 | End: 2026-03-16

## (undated) DEVICE — UNDERGLOVES BIOGEL PI SIZE 7.5

## (undated) DEVICE — SYR 50ML CATH TIP

## (undated) DEVICE — UNDERGLOVES BIOGEL PI SIZE 8

## (undated) DEVICE — PLUG CATHETER STERILE FOLEY

## (undated) DEVICE — Device

## (undated) DEVICE — COVER TRANSDUCER LATEX N/STERI

## (undated) DEVICE — SYR 50CC LL

## (undated) DEVICE — BLLN ENDOCAVITY 2X14CM

## (undated) DEVICE — SYR 10CC LUER LOCK

## (undated) DEVICE — PACK CYSTOSCOPY III SIRUS

## (undated) DEVICE — BLADE SENSICLIP CLIPPER SURG

## (undated) DEVICE — TRAY CYSTO BASIN OMC

## (undated) DEVICE — SOL WATER STRL IRR 1000ML

## (undated) DEVICE — UNDERGLOVES BIOGEL PI SZ 7 LF